# Patient Record
Sex: FEMALE | Race: BLACK OR AFRICAN AMERICAN | NOT HISPANIC OR LATINO | Employment: UNEMPLOYED | URBAN - METROPOLITAN AREA
[De-identification: names, ages, dates, MRNs, and addresses within clinical notes are randomized per-mention and may not be internally consistent; named-entity substitution may affect disease eponyms.]

---

## 2023-10-11 ENCOUNTER — ANCILLARY PROCEDURE (OUTPATIENT)
Dept: RADIOLOGY | Facility: CLINIC | Age: 28
End: 2023-10-11
Payer: COMMERCIAL

## 2023-10-11 DIAGNOSIS — Z36.3 ENCOUNTER FOR ANTENATAL SCREENING FOR MALFORMATIONS (HHS-HCC): ICD-10-CM

## 2023-10-11 PROCEDURE — 76805 OB US >/= 14 WKS SNGL FETUS: CPT | Performed by: OBSTETRICS & GYNECOLOGY

## 2023-10-11 PROCEDURE — 76805 OB US >/= 14 WKS SNGL FETUS: CPT

## 2023-10-17 ENCOUNTER — HOSPITAL ENCOUNTER (OUTPATIENT)
Facility: HOSPITAL | Age: 28
End: 2023-10-17
Attending: OBSTETRICS & GYNECOLOGY | Admitting: OBSTETRICS & GYNECOLOGY
Payer: COMMERCIAL

## 2023-10-17 ENCOUNTER — HOSPITAL ENCOUNTER (OUTPATIENT)
Facility: HOSPITAL | Age: 28
Discharge: HOME | End: 2023-10-17
Attending: OBSTETRICS & GYNECOLOGY | Admitting: OBSTETRICS & GYNECOLOGY
Payer: COMMERCIAL

## 2023-10-17 VITALS
HEIGHT: 65 IN | RESPIRATION RATE: 16 BRPM | OXYGEN SATURATION: 96 % | TEMPERATURE: 97.9 F | SYSTOLIC BLOOD PRESSURE: 118 MMHG | WEIGHT: 130 LBS | DIASTOLIC BLOOD PRESSURE: 61 MMHG | HEART RATE: 65 BPM | BODY MASS INDEX: 21.66 KG/M2

## 2023-10-17 PROCEDURE — 99214 OFFICE O/P EST MOD 30 MIN: CPT | Mod: 25

## 2023-10-17 PROCEDURE — 99214 OFFICE O/P EST MOD 30 MIN: CPT | Performed by: STUDENT IN AN ORGANIZED HEALTH CARE EDUCATION/TRAINING PROGRAM

## 2023-10-17 SDOH — SOCIAL STABILITY: SOCIAL INSECURITY: ARE YOU OR HAVE YOU BEEN THREATENED OR ABUSED PHYSICALLY, EMOTIONALLY, OR SEXUALLY BY ANYONE?: NO

## 2023-10-17 SDOH — HEALTH STABILITY: MENTAL HEALTH: NON-SPECIFIC ACTIVE SUICIDAL THOUGHTS (PAST 1 MONTH): NO

## 2023-10-17 SDOH — SOCIAL STABILITY: SOCIAL INSECURITY: PHYSICAL ABUSE: DENIES

## 2023-10-17 SDOH — SOCIAL STABILITY: SOCIAL INSECURITY: DOES ANYONE TRY TO KEEP YOU FROM HAVING/CONTACTING OTHER FRIENDS OR DOING THINGS OUTSIDE YOUR HOME?: NO

## 2023-10-17 SDOH — SOCIAL STABILITY: SOCIAL INSECURITY: ABUSE SCREEN: ADULT

## 2023-10-17 SDOH — ECONOMIC STABILITY: HOUSING INSECURITY: DO YOU FEEL UNSAFE GOING BACK TO THE PLACE WHERE YOU ARE LIVING?: NO

## 2023-10-17 SDOH — HEALTH STABILITY: MENTAL HEALTH: SUICIDAL BEHAVIOR (LIFETIME): NO

## 2023-10-17 SDOH — HEALTH STABILITY: MENTAL HEALTH: WISH TO BE DEAD (PAST 1 MONTH): NO

## 2023-10-17 SDOH — SOCIAL STABILITY: SOCIAL INSECURITY: HAVE YOU HAD THOUGHTS OF HARMING ANYONE ELSE?: NO

## 2023-10-17 SDOH — SOCIAL STABILITY: SOCIAL INSECURITY: DO YOU FEEL ANYONE HAS EXPLOITED OR TAKEN ADVANTAGE OF YOU FINANCIALLY OR OF YOUR PERSONAL PROPERTY?: NO

## 2023-10-17 SDOH — SOCIAL STABILITY: SOCIAL INSECURITY: VERBAL ABUSE: DENIES

## 2023-10-17 SDOH — SOCIAL STABILITY: SOCIAL INSECURITY: HAS ANYONE EVER THREATENED TO HURT YOUR FAMILY OR YOUR PETS?: NO

## 2023-10-17 SDOH — HEALTH STABILITY: MENTAL HEALTH: WERE YOU ABLE TO COMPLETE ALL THE BEHAVIORAL HEALTH SCREENINGS?: YES

## 2023-10-17 SDOH — SOCIAL STABILITY: SOCIAL INSECURITY: ARE THERE ANY APPARENT SIGNS OF INJURIES/BEHAVIORS THAT COULD BE RELATED TO ABUSE/NEGLECT?: NO

## 2023-10-17 ASSESSMENT — LIFESTYLE VARIABLES
SKIP TO QUESTIONS 9-10: 1
AUDIT-C TOTAL SCORE: 0
HOW OFTEN DO YOU HAVE 6 OR MORE DRINKS ON ONE OCCASION: NEVER
HOW MANY STANDARD DRINKS CONTAINING ALCOHOL DO YOU HAVE ON A TYPICAL DAY: PATIENT DOES NOT DRINK
AUDIT-C TOTAL SCORE: 0
HOW OFTEN DO YOU HAVE A DRINK CONTAINING ALCOHOL: NEVER

## 2023-10-17 ASSESSMENT — PATIENT HEALTH QUESTIONNAIRE - PHQ9
2. FEELING DOWN, DEPRESSED OR HOPELESS: NOT AT ALL
1. LITTLE INTEREST OR PLEASURE IN DOING THINGS: NOT AT ALL
SUM OF ALL RESPONSES TO PHQ9 QUESTIONS 1 & 2: 0

## 2023-10-17 ASSESSMENT — PAIN SCALES - GENERAL: PAINLEVEL_OUTOF10: 0 - NO PAIN
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
## 2023-11-13 ENCOUNTER — APPOINTMENT (OUTPATIENT)
Dept: RADIOLOGY | Facility: CLINIC | Age: 28
End: 2023-11-13
Payer: COMMERCIAL

## 2023-11-16 ENCOUNTER — HOSPITAL ENCOUNTER (INPATIENT)
Facility: HOSPITAL | Age: 28
LOS: 6 days | Discharge: HOME | End: 2023-11-22
Attending: OBSTETRICS & GYNECOLOGY | Admitting: OBSTETRICS & GYNECOLOGY
Payer: COMMERCIAL

## 2023-11-16 ENCOUNTER — HOSPITAL ENCOUNTER (EMERGENCY)
Facility: HOSPITAL | Age: 28
Discharge: OTHER INSTITUTION | End: 2023-11-16
Payer: COMMERCIAL

## 2023-11-16 DIAGNOSIS — F10.20 SEVERE ALCOHOL USE DISORDER (MULTI): Primary | ICD-10-CM

## 2023-11-16 DIAGNOSIS — K85.90 ACUTE RECURRENT PANCREATITIS (HHS-HCC): ICD-10-CM

## 2023-11-16 PROBLEM — F17.200 CURRENT EVERY DAY SMOKER: Status: ACTIVE | Noted: 2023-11-16

## 2023-11-16 PROBLEM — Z87.19 HISTORY OF PANCREATITIS: Status: ACTIVE | Noted: 2023-11-16

## 2023-11-16 PROBLEM — O09.30: Status: ACTIVE | Noted: 2023-11-16

## 2023-11-16 LAB
ABO GROUP (TYPE) IN BLOOD: NORMAL
ALBUMIN SERPL BCP-MCNC: 3.8 G/DL (ref 3.4–5)
ALP SERPL-CCNC: 49 U/L (ref 33–110)
ALT SERPL W P-5'-P-CCNC: 8 U/L (ref 7–45)
AMYLASE SERPL-CCNC: 119 U/L (ref 29–103)
ANION GAP SERPL CALC-SCNC: 16 MMOL/L (ref 10–20)
ANTIBODY SCREEN: NORMAL
AST SERPL W P-5'-P-CCNC: 15 U/L (ref 9–39)
BILIRUB SERPL-MCNC: 0.4 MG/DL (ref 0–1.2)
BILIRUBIN, POC: NEGATIVE
BLOOD URINE, POC: NEGATIVE
BUN SERPL-MCNC: 6 MG/DL (ref 6–23)
CALCIUM SERPL-MCNC: 8.7 MG/DL (ref 8.6–10.6)
CHLORIDE SERPL-SCNC: 104 MMOL/L (ref 98–107)
CLARITY, POC: CLEAR
CO2 SERPL-SCNC: 21 MMOL/L (ref 21–32)
COLOR, POC: YELLOW
CREAT SERPL-MCNC: 0.43 MG/DL (ref 0.5–1.05)
CREAT UR-MCNC: 185 MG/DL (ref 20–320)
ERYTHROCYTE [DISTWIDTH] IN BLOOD BY AUTOMATED COUNT: 13.6 % (ref 11.5–14.5)
FERRITIN SERPL-MCNC: 30 NG/ML
FLUAV RNA RESP QL NAA+PROBE: NOT DETECTED
FLUBV RNA RESP QL NAA+PROBE: NOT DETECTED
FOLATE SERPL-MCNC: 11.6 NG/ML
GFR SERPL CREATININE-BSD FRML MDRD: >90 ML/MIN/1.73M*2
GLUCOSE SERPL-MCNC: 83 MG/DL (ref 74–99)
GLUCOSE URINE, POC: NEGATIVE
HBV SURFACE AG SERPL QL IA: NONREACTIVE
HCT VFR BLD AUTO: 32.2 % (ref 36–46)
HCV AB SER QL: NONREACTIVE
HGB BLD-MCNC: 10.4 G/DL (ref 12–16)
HIV 1+2 AB+HIV1 P24 AG SERPL QL IA: NONREACTIVE
IRON SATN MFR SERPL: 30 %
IRON SERPL-MCNC: 152 UG/DL
KETONES, POC: POSITIVE
LDH SERPL L TO P-CCNC: 134 U/L (ref 84–246)
LEUKOCYTE EST, POC: NEGATIVE
LIPASE SERPL-CCNC: 568 U/L (ref 9–82)
MCH RBC QN AUTO: 30.5 PG (ref 26–34)
MCHC RBC AUTO-ENTMCNC: 32.3 G/DL (ref 32–36)
MCV RBC AUTO: 94 FL (ref 80–100)
NITRITE, POC: NEGATIVE
NRBC BLD-RTO: 0 /100 WBCS (ref 0–0)
PH, POC: 6
PLATELET # BLD AUTO: 196 X10*3/UL (ref 150–450)
POC APPEARANCE OF BODY FLUID: CLEAR
POTASSIUM SERPL-SCNC: 3.5 MMOL/L (ref 3.5–5.3)
PROT SERPL-MCNC: 6.5 G/DL (ref 6.4–8.2)
PROT UR-ACNC: 50 MG/DL (ref 5–24)
PROT/CREAT UR: 0.27 MG/MG CREAT (ref 0–0.17)
RBC # BLD AUTO: 3.41 X10*6/UL (ref 4–5.2)
REFLEX ADDED, ANEMIA PANEL: NORMAL
RH FACTOR (ANTIGEN D): NORMAL
RUBV IGG SERPL IA-ACNC: 2.1 IA
RUBV IGG SERPL QL IA: POSITIVE
SARS-COV-2 RNA RESP QL NAA+PROBE: NOT DETECTED
SODIUM SERPL-SCNC: 137 MMOL/L (ref 136–145)
SPECIFIC GRAVITY, POC: 1.03
T PALLIDUM AB SER QL: NONREACTIVE
TIBC SERPL-MCNC: 501 UG/DL
UIBC SERPL-MCNC: 349 UG/DL
URINE PROTEIN, POC: NORMAL
UROBILINOGEN, POC: 0.2
VIT B12 SERPL-MCNC: 226 PG/ML
WBC # BLD AUTO: 11.8 X10*3/UL (ref 4.4–11.3)

## 2023-11-16 PROCEDURE — 87340 HEPATITIS B SURFACE AG IA: CPT | Performed by: NURSE PRACTITIONER

## 2023-11-16 PROCEDURE — 86317 IMMUNOASSAY INFECTIOUS AGENT: CPT | Performed by: NURSE PRACTITIONER

## 2023-11-16 PROCEDURE — 87636 SARSCOV2 & INF A&B AMP PRB: CPT | Performed by: NURSE PRACTITIONER

## 2023-11-16 PROCEDURE — 87389 HIV-1 AG W/HIV-1&-2 AB AG IA: CPT | Performed by: NURSE PRACTITIONER

## 2023-11-16 PROCEDURE — 2500000004 HC RX 250 GENERAL PHARMACY W/ HCPCS (ALT 636 FOR OP/ED): Performed by: NURSE PRACTITIONER

## 2023-11-16 PROCEDURE — 85027 COMPLETE CBC AUTOMATED: CPT | Performed by: NURSE PRACTITIONER

## 2023-11-16 PROCEDURE — 99223 1ST HOSP IP/OBS HIGH 75: CPT | Performed by: NURSE PRACTITIONER

## 2023-11-16 PROCEDURE — 1210000001 HC SEMI-PRIVATE ROOM DAILY

## 2023-11-16 PROCEDURE — 86780 TREPONEMA PALLIDUM: CPT | Performed by: NURSE PRACTITIONER

## 2023-11-16 PROCEDURE — 99223 1ST HOSP IP/OBS HIGH 75: CPT | Performed by: OBSTETRICS & GYNECOLOGY

## 2023-11-16 PROCEDURE — 83690 ASSAY OF LIPASE: CPT | Performed by: NURSE PRACTITIONER

## 2023-11-16 PROCEDURE — 82728 ASSAY OF FERRITIN: CPT | Performed by: NURSE PRACTITIONER

## 2023-11-16 PROCEDURE — 82607 VITAMIN B-12: CPT | Performed by: NURSE PRACTITIONER

## 2023-11-16 PROCEDURE — 83615 LACTATE (LD) (LDH) ENZYME: CPT | Performed by: NURSE PRACTITIONER

## 2023-11-16 PROCEDURE — 82570 ASSAY OF URINE CREATININE: CPT | Performed by: NURSE PRACTITIONER

## 2023-11-16 PROCEDURE — 36415 COLL VENOUS BLD VENIPUNCTURE: CPT | Performed by: NURSE PRACTITIONER

## 2023-11-16 PROCEDURE — 86850 RBC ANTIBODY SCREEN: CPT | Performed by: NURSE PRACTITIONER

## 2023-11-16 PROCEDURE — 83550 IRON BINDING TEST: CPT | Performed by: NURSE PRACTITIONER

## 2023-11-16 PROCEDURE — 86803 HEPATITIS C AB TEST: CPT | Performed by: NURSE PRACTITIONER

## 2023-11-16 PROCEDURE — 81002 URINALYSIS NONAUTO W/O SCOPE: CPT | Performed by: NURSE PRACTITIONER

## 2023-11-16 PROCEDURE — 80053 COMPREHEN METABOLIC PANEL: CPT | Performed by: NURSE PRACTITIONER

## 2023-11-16 PROCEDURE — 4500999001 HC ED NO CHARGE

## 2023-11-16 PROCEDURE — 87086 URINE CULTURE/COLONY COUNT: CPT | Performed by: NURSE PRACTITIONER

## 2023-11-16 PROCEDURE — 82150 ASSAY OF AMYLASE: CPT | Performed by: NURSE PRACTITIONER

## 2023-11-16 PROCEDURE — 82746 ASSAY OF FOLIC ACID SERUM: CPT | Performed by: NURSE PRACTITIONER

## 2023-11-16 RX ORDER — FAMOTIDINE 10 MG/ML
40 INJECTION INTRAVENOUS
Status: DISCONTINUED | OUTPATIENT
Start: 2023-11-17 | End: 2023-11-22 | Stop reason: HOSPADM

## 2023-11-16 RX ORDER — MULTIVIT-MIN/IRON FUM/FOLIC AC 7.5 MG-4
1 TABLET ORAL DAILY
Status: DISCONTINUED | OUTPATIENT
Start: 2023-11-16 | End: 2023-11-22 | Stop reason: HOSPADM

## 2023-11-16 RX ORDER — ACETAMINOPHEN 325 MG/1
650 TABLET ORAL EVERY 6 HOURS PRN
Status: DISCONTINUED | OUTPATIENT
Start: 2023-11-16 | End: 2023-11-17

## 2023-11-16 RX ORDER — LIDOCAINE HYDROCHLORIDE 10 MG/ML
0.5 INJECTION INFILTRATION; PERINEURAL ONCE AS NEEDED
Status: DISCONTINUED | OUTPATIENT
Start: 2023-11-16 | End: 2023-11-16

## 2023-11-16 RX ORDER — SODIUM CHLORIDE, SODIUM LACTATE, POTASSIUM CHLORIDE, CALCIUM CHLORIDE 600; 310; 30; 20 MG/100ML; MG/100ML; MG/100ML; MG/100ML
100 INJECTION, SOLUTION INTRAVENOUS CONTINUOUS
Status: DISCONTINUED | OUTPATIENT
Start: 2023-11-16 | End: 2023-11-22 | Stop reason: HOSPADM

## 2023-11-16 RX ORDER — GABAPENTIN 300 MG/1
300 CAPSULE ORAL EVERY 8 HOURS SCHEDULED
Status: DISCONTINUED | OUTPATIENT
Start: 2023-11-16 | End: 2023-11-22 | Stop reason: HOSPADM

## 2023-11-16 RX ORDER — NIFEDIPINE 10 MG/1
10 CAPSULE ORAL ONCE AS NEEDED
Status: DISCONTINUED | OUTPATIENT
Start: 2023-11-16 | End: 2023-11-16

## 2023-11-16 RX ORDER — HYDRALAZINE HYDROCHLORIDE 20 MG/ML
5 INJECTION INTRAMUSCULAR; INTRAVENOUS ONCE AS NEEDED
Status: DISCONTINUED | OUTPATIENT
Start: 2023-11-16 | End: 2023-11-22 | Stop reason: HOSPADM

## 2023-11-16 RX ORDER — CYCLOBENZAPRINE HCL 10 MG
10 TABLET ORAL ONCE
Status: DISCONTINUED | OUTPATIENT
Start: 2023-11-16 | End: 2023-11-16

## 2023-11-16 RX ORDER — HYDROMORPHONE HYDROCHLORIDE 2 MG/1
1 TABLET ORAL EVERY 4 HOURS PRN
Status: DISCONTINUED | OUTPATIENT
Start: 2023-11-16 | End: 2023-11-21

## 2023-11-16 RX ORDER — FOLIC ACID 1 MG/1
1 TABLET ORAL DAILY
Status: DISCONTINUED | OUTPATIENT
Start: 2023-11-16 | End: 2023-11-22 | Stop reason: HOSPADM

## 2023-11-16 RX ORDER — ONDANSETRON HYDROCHLORIDE 2 MG/ML
4 INJECTION, SOLUTION INTRAVENOUS EVERY 6 HOURS
Status: DISCONTINUED | OUTPATIENT
Start: 2023-11-16 | End: 2023-11-17

## 2023-11-16 RX ORDER — BISACODYL 10 MG/1
10 SUPPOSITORY RECTAL DAILY PRN
Status: DISCONTINUED | OUTPATIENT
Start: 2023-11-16 | End: 2023-11-22 | Stop reason: HOSPADM

## 2023-11-16 RX ORDER — LABETALOL HYDROCHLORIDE 5 MG/ML
20 INJECTION, SOLUTION INTRAVENOUS ONCE AS NEEDED
Status: DISCONTINUED | OUTPATIENT
Start: 2023-11-16 | End: 2023-11-16

## 2023-11-16 RX ORDER — NICOTINE 7MG/24HR
1 PATCH, TRANSDERMAL 24 HOURS TRANSDERMAL DAILY
Status: DISCONTINUED | OUTPATIENT
Start: 2023-11-16 | End: 2023-11-22 | Stop reason: HOSPADM

## 2023-11-16 RX ORDER — HYDROMORPHONE HYDROCHLORIDE 1 MG/ML
0.4 INJECTION, SOLUTION INTRAMUSCULAR; INTRAVENOUS; SUBCUTANEOUS EVERY 2 HOUR PRN
Status: DISCONTINUED | OUTPATIENT
Start: 2023-11-16 | End: 2023-11-16

## 2023-11-16 RX ORDER — ADHESIVE BANDAGE
10 BANDAGE TOPICAL
Status: DISCONTINUED | OUTPATIENT
Start: 2023-11-16 | End: 2023-11-22 | Stop reason: HOSPADM

## 2023-11-16 RX ORDER — HYDROMORPHONE HYDROCHLORIDE 1 MG/ML
0.2 INJECTION, SOLUTION INTRAMUSCULAR; INTRAVENOUS; SUBCUTANEOUS
Status: DISCONTINUED | OUTPATIENT
Start: 2023-11-16 | End: 2023-11-16

## 2023-11-16 RX ORDER — THIAMINE HYDROCHLORIDE 100 MG/ML
100 INJECTION, SOLUTION INTRAMUSCULAR; INTRAVENOUS DAILY
Status: DISCONTINUED | OUTPATIENT
Start: 2023-11-16 | End: 2023-11-22 | Stop reason: HOSPADM

## 2023-11-16 RX ORDER — POLYETHYLENE GLYCOL 3350 17 G/17G
17 POWDER, FOR SOLUTION ORAL 2 TIMES DAILY PRN
Status: DISCONTINUED | OUTPATIENT
Start: 2023-11-16 | End: 2023-11-22 | Stop reason: HOSPADM

## 2023-11-16 RX ORDER — ONDANSETRON HYDROCHLORIDE 2 MG/ML
4 INJECTION, SOLUTION INTRAVENOUS ONCE
Status: COMPLETED | OUTPATIENT
Start: 2023-11-16 | End: 2023-11-16

## 2023-11-16 RX ORDER — SIMETHICONE 80 MG
80 TABLET,CHEWABLE ORAL 4 TIMES DAILY PRN
Status: DISCONTINUED | OUTPATIENT
Start: 2023-11-16 | End: 2023-11-22 | Stop reason: HOSPADM

## 2023-11-16 RX ORDER — NIFEDIPINE 10 MG/1
10 CAPSULE ORAL ONCE AS NEEDED
Status: DISCONTINUED | OUTPATIENT
Start: 2023-11-16 | End: 2023-11-22 | Stop reason: HOSPADM

## 2023-11-16 RX ORDER — HYDROMORPHONE HYDROCHLORIDE 1 MG/ML
0.4 INJECTION, SOLUTION INTRAMUSCULAR; INTRAVENOUS; SUBCUTANEOUS EVERY 2 HOUR PRN
Status: DISCONTINUED | OUTPATIENT
Start: 2023-11-16 | End: 2023-11-17

## 2023-11-16 RX ORDER — ACETAMINOPHEN 325 MG/1
975 TABLET ORAL ONCE
Status: DISCONTINUED | OUTPATIENT
Start: 2023-11-16 | End: 2023-11-16

## 2023-11-16 RX ORDER — NALTREXONE HYDROCHLORIDE 50 MG/1
50 TABLET, FILM COATED ORAL DAILY
Status: DISCONTINUED | OUTPATIENT
Start: 2023-11-17 | End: 2023-11-17

## 2023-11-16 RX ORDER — HYDROMORPHONE HYDROCHLORIDE 1 MG/ML
0.4 INJECTION, SOLUTION INTRAMUSCULAR; INTRAVENOUS; SUBCUTANEOUS ONCE
Status: DISCONTINUED | OUTPATIENT
Start: 2023-11-16 | End: 2023-11-16

## 2023-11-16 RX ADMIN — ONDANSETRON 4 MG: 2 INJECTION INTRAMUSCULAR; INTRAVENOUS at 18:16

## 2023-11-16 RX ADMIN — HYDROMORPHONE HYDROCHLORIDE 0.2 MG: 1 INJECTION, SOLUTION INTRAMUSCULAR; INTRAVENOUS; SUBCUTANEOUS at 18:17

## 2023-11-16 RX ADMIN — SODIUM CHLORIDE, POTASSIUM CHLORIDE, SODIUM LACTATE AND CALCIUM CHLORIDE 1000 ML: 600; 310; 30; 20 INJECTION, SOLUTION INTRAVENOUS at 18:18

## 2023-11-16 RX ADMIN — SODIUM CHLORIDE, POTASSIUM CHLORIDE, SODIUM LACTATE AND CALCIUM CHLORIDE 125 ML/HR: 600; 310; 30; 20 INJECTION, SOLUTION INTRAVENOUS at 20:45

## 2023-11-16 RX ADMIN — HYDROMORPHONE HYDROCHLORIDE 0.4 MG: 1 INJECTION, SOLUTION INTRAMUSCULAR; INTRAVENOUS; SUBCUTANEOUS at 20:42

## 2023-11-16 SDOH — SOCIAL STABILITY: SOCIAL INSECURITY: DOES ANYONE TRY TO KEEP YOU FROM HAVING/CONTACTING OTHER FRIENDS OR DOING THINGS OUTSIDE YOUR HOME?: NO

## 2023-11-16 SDOH — HEALTH STABILITY: MENTAL HEALTH: WISH TO BE DEAD (PAST 1 MONTH): NO

## 2023-11-16 SDOH — HEALTH STABILITY: MENTAL HEALTH: NON-SPECIFIC ACTIVE SUICIDAL THOUGHTS (PAST 1 MONTH): NO

## 2023-11-16 SDOH — SOCIAL STABILITY: SOCIAL INSECURITY: ABUSE SCREEN: ADULT

## 2023-11-16 SDOH — SOCIAL STABILITY: SOCIAL INSECURITY: HAS ANYONE EVER THREATENED TO HURT YOUR FAMILY OR YOUR PETS?: NO

## 2023-11-16 SDOH — HEALTH STABILITY: MENTAL HEALTH: HAVE YOU USED ANY SUBSTANCES (CANABIS, COCAINE, HEROIN, HALLUCINOGENS, INHALANTS, ETC.) IN THE PAST 12 MONTHS?: NO

## 2023-11-16 SDOH — SOCIAL STABILITY: SOCIAL INSECURITY: ARE THERE ANY APPARENT SIGNS OF INJURIES/BEHAVIORS THAT COULD BE RELATED TO ABUSE/NEGLECT?: NO

## 2023-11-16 SDOH — SOCIAL STABILITY: SOCIAL INSECURITY: PHYSICAL ABUSE: DENIES

## 2023-11-16 SDOH — SOCIAL STABILITY: SOCIAL INSECURITY: VERBAL ABUSE: DENIES

## 2023-11-16 SDOH — SOCIAL STABILITY: SOCIAL INSECURITY: HAVE YOU HAD THOUGHTS OF HARMING ANYONE ELSE?: NO

## 2023-11-16 SDOH — HEALTH STABILITY: MENTAL HEALTH: HAVE YOU USED ANY PRESCRIPTION DRUGS OTHER THAN PRESCRIBED IN THE PAST 12 MONTHS?: NO

## 2023-11-16 SDOH — SOCIAL STABILITY: SOCIAL INSECURITY: DO YOU FEEL ANYONE HAS EXPLOITED OR TAKEN ADVANTAGE OF YOU FINANCIALLY OR OF YOUR PERSONAL PROPERTY?: NO

## 2023-11-16 SDOH — SOCIAL STABILITY: SOCIAL INSECURITY: ARE YOU OR HAVE YOU BEEN THREATENED OR ABUSED PHYSICALLY, EMOTIONALLY, OR SEXUALLY BY ANYONE?: NO

## 2023-11-16 SDOH — ECONOMIC STABILITY: HOUSING INSECURITY: DO YOU FEEL UNSAFE GOING BACK TO THE PLACE WHERE YOU ARE LIVING?: NO

## 2023-11-16 SDOH — HEALTH STABILITY: MENTAL HEALTH: SUICIDAL BEHAVIOR (LIFETIME): NO

## 2023-11-16 SDOH — HEALTH STABILITY: MENTAL HEALTH: WERE YOU ABLE TO COMPLETE ALL THE BEHAVIORAL HEALTH SCREENINGS?: YES

## 2023-11-16 ASSESSMENT — PAIN DESCRIPTION - LOCATION: LOCATION: ABDOMEN

## 2023-11-16 ASSESSMENT — LIFESTYLE VARIABLES
HOW OFTEN DO YOU HAVE 6 OR MORE DRINKS ON ONE OCCASION: NEVER
HOW OFTEN DO YOU HAVE A DRINK CONTAINING ALCOHOL: NEVER
HOW MANY STANDARD DRINKS CONTAINING ALCOHOL DO YOU HAVE ON A TYPICAL DAY: PATIENT DOES NOT DRINK
SKIP TO QUESTIONS 9-10: 1
AUDIT-C TOTAL SCORE: 0
AUDIT-C TOTAL SCORE: 0

## 2023-11-16 ASSESSMENT — PATIENT HEALTH QUESTIONNAIRE - PHQ9
1. LITTLE INTEREST OR PLEASURE IN DOING THINGS: NOT AT ALL
SUM OF ALL RESPONSES TO PHQ9 QUESTIONS 1 & 2: 0
2. FEELING DOWN, DEPRESSED OR HOPELESS: NOT AT ALL

## 2023-11-16 ASSESSMENT — PAIN SCALES - GENERAL: PAINLEVEL_OUTOF10: 10 - WORST POSSIBLE PAIN

## 2023-11-16 NOTE — H&P
Obstetrical Admission History and Physical     Arlen Braga is a 27 y.o. [id number] at 24w3d. FLACO: 3/4/2024, by LMP c/w 12.1 wk US.  She has had limited prenatal care with [facility] .    Chief Complaint: Abdominal pain, N/V/D    Assessment/Plan    #Acute Pancreatitis, presumably related to ongoing EtOH use and recent nausea and poor hydration status  - 1L fluid bolus given in triage for hypovolemia (SG 1.030, 3+ ketones), will follow with IVF at 125mL/hr until tolerating PO well  - Pain control with IV and PO opioids with plan to transition to PO Tylenol once patient out of acute pain   - Pepcid 40mg IV q24 hrs  - Check amylase, lipase, magnesium, CBC, CMP, ionized calcium q8 until stable x 2. Initial labs notable for elevated lipase 568 and amylase 119 with nl LFTs, nl CBC.  - NPO at this time, will advance as tolerated  - Strict I/O  - COVID/flu neg. Urine culture pending as part of routine PNL. No other sick symptoms at this time. Afebrile.    # AUD with onset after birth of her 7 year old daughter, Anxiety, Depression  - Pt notes reduction of EtOH use since pregnancy but is triggered to drink as a coping mechanism for anxiety, life stress  - [facility] protocol on admission  - Thiamine 100 mg IV daily  - Gabapentin 300mg TID for both treatment of anxiety, insomnia, and risk for mild alcohol withdrawal. Pt states she tries SRIs in the past (? Zoloft) and it made her feel too numb.   - Recommend adding naltexone 50mg daily to help with alcohol use reduction once no longer requiring opioid medications  - Pt states she is already engaged in individual behavioral counseling with a counselor at [facility] and is not interested in further referrals at this time  - Mom was interested in a  referral to help her obtain a crib and pack-n-play and assistance with finding better housing    # Nicotine dependency  - Nicotine patch 7mg/d    # Fetal wellbeing  - NST reactive in triage  - PNL drawn on admission as we are unable to access [facility]  records  - Plan for completion of anatomy scan in AM  - [facility] protocol    # Elevated blood pressure  - Bps cycled in triage and have been mild range to normotensive  - no h/o HDP and does not yet meet criteria for HDP  - no PEC s/s  - HELLP labs neg with P:C 0.27  - suspect pain related elevation of BP    Admit to [facility] under Antepartum service  Staffed with KIRILL Gallo    I saw and evaluated the patient. I personally obtained the key and critical portions of the history and physical exam or was physically present for key and critical portions performed by the NP. I reviewed the NP's documentation and made edits as necessary. I agree with the medical decision making as documented in the note.    Kathryn Dotson MD    Principal Problem:    Acute recurrent pancreatitis      Pregnancy Problems (from 10/17/23 to present)       Problem Noted Resolved    History of pancreatitis 11/16/2023 by KIRILL Levy No    Priority:  Medium      Overview Addendum 11/16/2023  5:16 PM by KIRILL Levy     Hospitalized in 3/2023         Severe alcohol use disorder (CMS/HCC) 11/16/2023 by KIRILL Levy No    Priority:  Medium      Overview Signed 11/16/2023  5:17 PM by KIRILL Levy     Admits to drinking one MXD adiar can daily throughout pregnancy.          Limited prenatal care, antepartum 11/16/2023 by KIRILL Levy No    Priority:  Medium      Current every day smoker 11/16/2023 by KIRILL Levy No    Priority:  Medium      Overview Signed 11/16/2023  8:34 PM by KIRILL Levy     Reports 6 cigarettes per day         Asthma 3/22/2011 by KIRILL Levy No    Priority:  Medium      Overview Signed 11/16/2023  5:03 PM by KIRILL Levy     Never been hospitalized. Using rescue inhaler once Q night               The patient's pregnancy complications have been discussed with the patient in detail.  Admit to inpatient status. I anticipate  that this patient will require a stay exceeding 2 midnights.  Active management of the pregnancy complications.     Subjective   Arlen is here with c/o abdominal pain nausea/vomiting/diarrhea since 0500. She reports the abdominal pain as sharp, constant, left sided abdominal pain. She felt nauseated and had hot flashes all day yesterday but the vomiting and diarrhea started today. Unsure if she has had a fever. 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 She denies any VB, LOF, vaginal itching, irritation, odor, or concern for STIs. She reports good FM.     Additionally, pt has h/o acute alcoholic pancreatitis 3/2023 and admits to one MXD alcoholic beverage daily throughout pregnancy. She states this abdominal pain is similar to pain when admitted with pancreatitis.     Of note: pt had mild range BP on arrival to triage. She has no h/o HDP and denies HA, scotoma, RUQ pain, chest pain, or SOB.     The patient is being admitted for further evaluation and monitoring.      Obstetrical History   OB History    Para Term  AB Living   5 1 1   3     SAB IAB Ectopic Multiple Live Births   3              # Outcome Date GA Lbr Olegario/2nd Weight Sex Delivery Anes PTL Lv   5 Current            4 2018           3 Term 2016   2948 g F Vag-Spont      2 2014           1 2012               Past Medical History  No past medical history on file.     Past Surgical History   Past Surgical History:   Procedure Laterality Date    WISDOM TOOTH EXTRACTION Bilateral     pt unsure when       Social History  Social History     Tobacco Use    Smoking status: Not on file    Smokeless tobacco: Not on file   Substance Use Topics    Alcohol use: Not on file     Substance and Sexual Activity   Drug Use Not on file       Allergies  Patient has no known allergies.     Medications  No medications prior to admission.       Objective    Last Vitals  Temp  Pulse Resp BP MAP O2 Sat     73   139/88   100 %     Physical Examination  Physical Exam  Constitutional:       General: She is in acute distress.      Appearance: She is ill-appearing.   HENT:      Head: Normocephalic.   Eyes:      Comments: Conjunctiva slightly yellow bilaterally   Cardiovascular:      Rate and Rhythm: Normal rate and regular rhythm.      Heart sounds: Normal heart sounds.   Pulmonary:      Effort: Pulmonary effort is normal.      Breath sounds: Normal breath sounds.   Abdominal:      Palpations: Abdomen is soft.      Tenderness: There is abdominal tenderness.      Comments: Left upper and lower quads tender to mild palpation  No RUQ pain with palpation   Genitourinary:     Comments: , mod variability, appropriate for gestational age  Goddard: Q 3-4 mins, abdomen soft to palpate  SVE closed/50/-3  Musculoskeletal:         General: Normal range of motion.      Cervical back: Normal range of motion.   Skin:     General: Skin is warm and dry.   Neurological:      General: No focal deficit present.      Mental Status: She is alert and oriented to person, place, and time.   Psychiatric:         Behavior: Behavior normal.     Lab Review  Admission on 11/16/2023   Component Date Value Ref Range Status    Color, UA 11/16/2023 Yellow   Final    Clarity, UA 11/16/2023 Clear   Final    Glucose, UA 11/16/2023 NEGATIVE   Final    Bilirubin, UA 11/16/2023 NEGATIVE   Final    Ketones, UA 11/16/2023 Positive   Final    Spec Grav, UA 11/16/2023 1.030   Final    Blood, UA 11/16/2023 Negative   Final    pH, UA 11/16/2023 6.0   Final    Protein, UA 11/16/2023 1+   Final    Urobilinogen, UA 11/16/2023 0.2   Final    Leukocytes, UA 11/16/2023 NEGATIVE   Final    Nitrite, UA 11/16/2023 NEGATIVE   Final    Appearance, Fluid 11/16/2023 Clear   Final    Glucose 11/16/2023 83  74 - 99 mg/dL Final    Sodium 11/16/2023 137  136 - 145 mmol/L Final    Potassium 11/16/2023 3.5  3.5 - 5.3 mmol/L Final    Chloride 11/16/2023  104  98 - 107 mmol/L Final    Bicarbonate 11/16/2023 21  21 - 32 mmol/L Final    Anion Gap 11/16/2023 16  10 - 20 mmol/L Final    Urea Nitrogen 11/16/2023 6  6 - 23 mg/dL Final    Creatinine 11/16/2023 0.43 (L)  0.50 - 1.05 mg/dL Final    eGFR 11/16/2023 >90  >60 mL/min/1.73m*2 Final    Calculations of estimated GFR are performed using the 2021 CKD-EPI Study Refit equation without the race variable for the IDMS-Traceable creatinine methods.  https://jasn.asnjournals.org/content/early/2021/09/22/ASN.8228564662    Calcium 11/16/2023 8.7  8.6 - 10.6 mg/dL Final    Albumin 11/16/2023 3.8  3.4 - 5.0 g/dL Final    Alkaline Phosphatase 11/16/2023 49  33 - 110 U/L Final    Total Protein 11/16/2023 6.5  6.4 - 8.2 g/dL Final    AST 11/16/2023 15  9 - 39 U/L Final    Bilirubin, Total 11/16/2023 0.4  0.0 - 1.2 mg/dL Final    ALT 11/16/2023 8  7 - 45 U/L Final    Patients treated with Sulfasalazine may generate falsely decreased results for ALT.    LDH 11/16/2023 134  84 - 246 U/L Final    Total Protein, Urine Random 11/16/2023 50 (H)  5 - 24 mg/dL Final    Creatinine, Urine Random 11/16/2023 185.0  20.0 - 320.0 mg/dL Final    T. Protein/Creatinine Ratio 11/16/2023 0.27 (H)  0.00 - 0.17 mg/mg Creat Final    Coronavirus 2019, PCR 11/16/2023 Not Detected  Not Detected Final    Flu A Result 11/16/2023 Not Detected  Not Detected Final    Flu B Result 11/16/2023 Not Detected  Not Detected Final    Amylase 11/16/2023 119 (H)  29 - 103 U/L Final    Lipase 11/16/2023 568 (H)  9 - 82 U/L Final    WBC 11/16/2023 11.8 (H)  4.4 - 11.3 x10*3/uL Final    nRBC 11/16/2023 0.0  0.0 - 0.0 /100 WBCs Final    RBC 11/16/2023 3.41 (L)  4.00 - 5.20 x10*6/uL Final    Hemoglobin 11/16/2023 10.4 (L)  12.0 - 16.0 g/dL Final    Hematocrit 11/16/2023 32.2 (L)  36.0 - 46.0 % Final    MCV 11/16/2023 94  80 - 100 fL Final    MCH 11/16/2023 30.5  26.0 - 34.0 pg Final    MCHC 11/16/2023 32.3  32.0 - 36.0 g/dL Final    RDW 11/16/2023 13.6  11.5 - 14.5 % Final     Platelets 11/16/2023 196  150 - 450 x10*3/uL Final    Syphilis Total Ab 11/16/2023 Nonreactive  Nonreactive Final    No significant level of Treponema pallidum antibody detected.   Repeat testing in 2 to 4 weeks may be considered if early   infection or incubating syphilis infection is suspected.    Hepatitis B Surface AG 11/16/2023 Nonreactive  Nonreactive Final    Biotin interference may cause falsely decreased results. Patients taking a Biotin dose of up to 5 mg/day should refrain from taking Biotin for 24 hours before sample collection. Providers may contact their local laboratory for  further information.    Hepatitis C AB 11/16/2023 Nonreactive  Nonreactive Final    Results from patients taking biotin supplements or receiving high-dose biotin therapy should be interpreted with caution due to possible interference with this test. Providers may contact their local laboratory for further information.    HIV 1/2 Antigen/Antibody Screen wi* 11/16/2023 Nonreactive  Nonreactive Final

## 2023-11-17 ENCOUNTER — APPOINTMENT (OUTPATIENT)
Dept: RADIOLOGY | Facility: HOSPITAL | Age: 28
End: 2023-11-17
Payer: COMMERCIAL

## 2023-11-17 LAB
ALBUMIN SERPL BCP-MCNC: 3.1 G/DL (ref 3.4–5)
ALBUMIN SERPL BCP-MCNC: 3.2 G/DL (ref 3.4–5)
ALBUMIN SERPL BCP-MCNC: 3.6 G/DL (ref 3.4–5)
ALP SERPL-CCNC: 42 U/L (ref 33–110)
ALP SERPL-CCNC: 43 U/L (ref 33–110)
ALP SERPL-CCNC: 45 U/L (ref 33–110)
ALT SERPL W P-5'-P-CCNC: 5 U/L (ref 7–45)
ALT SERPL W P-5'-P-CCNC: 7 U/L (ref 7–45)
ALT SERPL W P-5'-P-CCNC: 7 U/L (ref 7–45)
AMYLASE SERPL-CCNC: 167 U/L (ref 29–103)
AMYLASE SERPL-CCNC: 204 U/L (ref 29–103)
ANION GAP SERPL CALC-SCNC: 10 MMOL/L (ref 10–20)
ANION GAP SERPL CALC-SCNC: 12 MMOL/L (ref 10–20)
ANION GAP SERPL CALC-SCNC: 14 MMOL/L (ref 10–20)
AST SERPL W P-5'-P-CCNC: 12 U/L (ref 9–39)
AST SERPL W P-5'-P-CCNC: 13 U/L (ref 9–39)
AST SERPL W P-5'-P-CCNC: 13 U/L (ref 9–39)
BACTERIA UR CULT: NO GROWTH
BASOPHILS # BLD AUTO: 0.01 X10*3/UL (ref 0–0.1)
BASOPHILS NFR BLD AUTO: 0.1 %
BILIRUB SERPL-MCNC: 0.5 MG/DL (ref 0–1.2)
BUN SERPL-MCNC: 4 MG/DL (ref 6–23)
BUN SERPL-MCNC: 4 MG/DL (ref 6–23)
BUN SERPL-MCNC: 5 MG/DL (ref 6–23)
CA-I BLD-SCNC: 1.13 MMOL/L (ref 1.1–1.33)
CALCIUM SERPL-MCNC: 8.1 MG/DL (ref 8.6–10.6)
CALCIUM SERPL-MCNC: 8.4 MG/DL (ref 8.6–10.6)
CALCIUM SERPL-MCNC: 8.5 MG/DL (ref 8.6–10.6)
CHLORIDE SERPL-SCNC: 103 MMOL/L (ref 98–107)
CHLORIDE SERPL-SCNC: 104 MMOL/L (ref 98–107)
CHLORIDE SERPL-SCNC: 105 MMOL/L (ref 98–107)
CO2 SERPL-SCNC: 21 MMOL/L (ref 21–32)
CO2 SERPL-SCNC: 22 MMOL/L (ref 21–32)
CO2 SERPL-SCNC: 23 MMOL/L (ref 21–32)
CREAT SERPL-MCNC: 0.39 MG/DL (ref 0.5–1.05)
CREAT SERPL-MCNC: 0.43 MG/DL (ref 0.5–1.05)
CREAT SERPL-MCNC: 0.45 MG/DL (ref 0.5–1.05)
EOSINOPHIL # BLD AUTO: 0.01 X10*3/UL (ref 0–0.7)
EOSINOPHIL NFR BLD AUTO: 0.1 %
ERYTHROCYTE [DISTWIDTH] IN BLOOD BY AUTOMATED COUNT: 13.3 % (ref 11.5–14.5)
ERYTHROCYTE [DISTWIDTH] IN BLOOD BY AUTOMATED COUNT: 13.6 % (ref 11.5–14.5)
GFR SERPL CREATININE-BSD FRML MDRD: >90 ML/MIN/1.73M*2
GLUCOSE SERPL-MCNC: 76 MG/DL (ref 74–99)
GLUCOSE SERPL-MCNC: 88 MG/DL (ref 74–99)
GLUCOSE SERPL-MCNC: 90 MG/DL (ref 74–99)
HCT VFR BLD AUTO: 28.2 % (ref 36–46)
HCT VFR BLD AUTO: 29 % (ref 36–46)
HGB BLD-MCNC: 9 G/DL (ref 12–16)
HGB BLD-MCNC: 9.1 G/DL (ref 12–16)
IMM GRANULOCYTES # BLD AUTO: 0.06 X10*3/UL (ref 0–0.7)
IMM GRANULOCYTES NFR BLD AUTO: 0.5 % (ref 0–0.9)
LIPASE SERPL-CCNC: 1081 U/L (ref 9–82)
LIPASE SERPL-CCNC: 1668 U/L (ref 9–82)
LIPASE SERPL-CCNC: 1955 U/L (ref 9–82)
LYMPHOCYTES # BLD AUTO: 1.35 X10*3/UL (ref 1.2–4.8)
LYMPHOCYTES NFR BLD AUTO: 12.2 %
MAGNESIUM SERPL-MCNC: 1.52 MG/DL (ref 1.6–2.4)
MAGNESIUM SERPL-MCNC: 1.96 MG/DL (ref 1.6–2.4)
MCH RBC QN AUTO: 30 PG (ref 26–34)
MCH RBC QN AUTO: 30.5 PG (ref 26–34)
MCHC RBC AUTO-ENTMCNC: 31 G/DL (ref 32–36)
MCHC RBC AUTO-ENTMCNC: 32.3 G/DL (ref 32–36)
MCV RBC AUTO: 95 FL (ref 80–100)
MCV RBC AUTO: 97 FL (ref 80–100)
MONOCYTES # BLD AUTO: 0.55 X10*3/UL (ref 0.1–1)
MONOCYTES NFR BLD AUTO: 5 %
NEUTROPHILS # BLD AUTO: 9.05 X10*3/UL (ref 1.2–7.7)
NEUTROPHILS NFR BLD AUTO: 82.1 %
NRBC BLD-RTO: 0 /100 WBCS (ref 0–0)
NRBC BLD-RTO: 0 /100 WBCS (ref 0–0)
PLATELET # BLD AUTO: 158 X10*3/UL (ref 150–450)
PLATELET # BLD AUTO: 169 X10*3/UL (ref 150–450)
POTASSIUM SERPL-SCNC: 3.4 MMOL/L (ref 3.5–5.3)
POTASSIUM SERPL-SCNC: 3.7 MMOL/L (ref 3.5–5.3)
POTASSIUM SERPL-SCNC: 4 MMOL/L (ref 3.5–5.3)
PROT SERPL-MCNC: 5.2 G/DL (ref 6.4–8.2)
PROT SERPL-MCNC: 5.4 G/DL (ref 6.4–8.2)
PROT SERPL-MCNC: 6 G/DL (ref 6.4–8.2)
RBC # BLD AUTO: 2.98 X10*6/UL (ref 4–5.2)
RBC # BLD AUTO: 3 X10*6/UL (ref 4–5.2)
SODIUM SERPL-SCNC: 133 MMOL/L (ref 136–145)
SODIUM SERPL-SCNC: 135 MMOL/L (ref 136–145)
SODIUM SERPL-SCNC: 135 MMOL/L (ref 136–145)
WBC # BLD AUTO: 11 X10*3/UL (ref 4.4–11.3)
WBC # BLD AUTO: 9.7 X10*3/UL (ref 4.4–11.3)

## 2023-11-17 PROCEDURE — 36415 COLL VENOUS BLD VENIPUNCTURE: CPT

## 2023-11-17 PROCEDURE — 82330 ASSAY OF CALCIUM: CPT

## 2023-11-17 PROCEDURE — 59025 FETAL NON-STRESS TEST: CPT | Mod: GC | Performed by: STUDENT IN AN ORGANIZED HEALTH CARE EDUCATION/TRAINING PROGRAM

## 2023-11-17 PROCEDURE — 76705 ECHO EXAM OF ABDOMEN: CPT | Performed by: RADIOLOGY

## 2023-11-17 PROCEDURE — 1210000001 HC SEMI-PRIVATE ROOM DAILY

## 2023-11-17 PROCEDURE — 83690 ASSAY OF LIPASE: CPT

## 2023-11-17 PROCEDURE — 76816 OB US FOLLOW-UP PER FETUS: CPT | Performed by: OBSTETRICS & GYNECOLOGY

## 2023-11-17 PROCEDURE — 80053 COMPREHEN METABOLIC PANEL: CPT

## 2023-11-17 PROCEDURE — 85027 COMPLETE CBC AUTOMATED: CPT

## 2023-11-17 PROCEDURE — 99222 1ST HOSP IP/OBS MODERATE 55: CPT | Performed by: STUDENT IN AN ORGANIZED HEALTH CARE EDUCATION/TRAINING PROGRAM

## 2023-11-17 PROCEDURE — 2500000001 HC RX 250 WO HCPCS SELF ADMINISTERED DRUGS (ALT 637 FOR MEDICARE OP)

## 2023-11-17 PROCEDURE — 2500000004 HC RX 250 GENERAL PHARMACY W/ HCPCS (ALT 636 FOR OP/ED)

## 2023-11-17 PROCEDURE — 85025 COMPLETE CBC W/AUTO DIFF WBC: CPT

## 2023-11-17 PROCEDURE — 82150 ASSAY OF AMYLASE: CPT

## 2023-11-17 PROCEDURE — 76816 OB US FOLLOW-UP PER FETUS: CPT

## 2023-11-17 PROCEDURE — 2500000004 HC RX 250 GENERAL PHARMACY W/ HCPCS (ALT 636 FOR OP/ED): Performed by: STUDENT IN AN ORGANIZED HEALTH CARE EDUCATION/TRAINING PROGRAM

## 2023-11-17 PROCEDURE — 83690 ASSAY OF LIPASE: CPT | Performed by: STUDENT IN AN ORGANIZED HEALTH CARE EDUCATION/TRAINING PROGRAM

## 2023-11-17 PROCEDURE — 80053 COMPREHEN METABOLIC PANEL: CPT | Performed by: STUDENT IN AN ORGANIZED HEALTH CARE EDUCATION/TRAINING PROGRAM

## 2023-11-17 PROCEDURE — 59025 FETAL NON-STRESS TEST: CPT | Performed by: STUDENT IN AN ORGANIZED HEALTH CARE EDUCATION/TRAINING PROGRAM

## 2023-11-17 PROCEDURE — 83735 ASSAY OF MAGNESIUM: CPT

## 2023-11-17 RX ORDER — MAGNESIUM SULFATE HEPTAHYDRATE 40 MG/ML
2 INJECTION, SOLUTION INTRAVENOUS ONCE
Status: COMPLETED | OUTPATIENT
Start: 2023-11-17 | End: 2023-11-17

## 2023-11-17 RX ORDER — ONDANSETRON HYDROCHLORIDE 2 MG/ML
4 INJECTION, SOLUTION INTRAVENOUS EVERY 6 HOURS PRN
Status: DISCONTINUED | OUTPATIENT
Start: 2023-11-17 | End: 2023-11-22 | Stop reason: HOSPADM

## 2023-11-17 RX ORDER — HYDROMORPHONE HYDROCHLORIDE 1 MG/ML
0.4 INJECTION, SOLUTION INTRAMUSCULAR; INTRAVENOUS; SUBCUTANEOUS
Status: DISCONTINUED | OUTPATIENT
Start: 2023-11-17 | End: 2023-11-18

## 2023-11-17 RX ORDER — ACETAMINOPHEN 325 MG/1
975 TABLET ORAL EVERY 6 HOURS
Status: DISCONTINUED | OUTPATIENT
Start: 2023-11-17 | End: 2023-11-22 | Stop reason: HOSPADM

## 2023-11-17 RX ORDER — POTASSIUM CHLORIDE 14.9 MG/ML
20 INJECTION INTRAVENOUS ONCE
Status: COMPLETED | OUTPATIENT
Start: 2023-11-17 | End: 2023-11-17

## 2023-11-17 RX ORDER — POTASSIUM CHLORIDE 14.9 MG/ML
20 INJECTION INTRAVENOUS
Status: DISCONTINUED | OUTPATIENT
Start: 2023-11-17 | End: 2023-11-17

## 2023-11-17 RX ADMIN — ACETAMINOPHEN 650 MG: 325 TABLET ORAL at 08:05

## 2023-11-17 RX ADMIN — POTASSIUM CHLORIDE 20 MEQ: 14.9 INJECTION, SOLUTION INTRAVENOUS at 09:21

## 2023-11-17 RX ADMIN — SODIUM CHLORIDE, POTASSIUM CHLORIDE, SODIUM LACTATE AND CALCIUM CHLORIDE 100 ML/HR: 600; 310; 30; 20 INJECTION, SOLUTION INTRAVENOUS at 20:58

## 2023-11-17 RX ADMIN — SIMETHICONE 80 MG: 80 TABLET, CHEWABLE ORAL at 20:57

## 2023-11-17 RX ADMIN — HYDROMORPHONE HYDROCHLORIDE 0.4 MG: 1 INJECTION, SOLUTION INTRAMUSCULAR; INTRAVENOUS; SUBCUTANEOUS at 00:10

## 2023-11-17 RX ADMIN — ACETAMINOPHEN 975 MG: 325 TABLET ORAL at 20:57

## 2023-11-17 RX ADMIN — HYDROMORPHONE HYDROCHLORIDE 0.4 MG: 1 INJECTION, SOLUTION INTRAMUSCULAR; INTRAVENOUS; SUBCUTANEOUS at 04:05

## 2023-11-17 RX ADMIN — FOLIC ACID 1 MG: 1 TABLET ORAL at 09:21

## 2023-11-17 RX ADMIN — ACETAMINOPHEN 975 MG: 325 TABLET ORAL at 16:08

## 2023-11-17 RX ADMIN — MAGNESIUM SULFATE HEPTAHYDRATE 2 G: 40 INJECTION, SOLUTION INTRAVENOUS at 04:06

## 2023-11-17 RX ADMIN — THIAMINE HYDROCHLORIDE 100 MG: 100 INJECTION, SOLUTION INTRAMUSCULAR; INTRAVENOUS at 09:21

## 2023-11-17 RX ADMIN — HYDROMORPHONE HYDROCHLORIDE 1 MG: 2 TABLET ORAL at 02:57

## 2023-11-17 RX ADMIN — GABAPENTIN 300 MG: 300 CAPSULE ORAL at 16:08

## 2023-11-17 RX ADMIN — GABAPENTIN 300 MG: 300 CAPSULE ORAL at 09:21

## 2023-11-17 RX ADMIN — HYDROMORPHONE HYDROCHLORIDE 1 MG: 2 TABLET ORAL at 12:34

## 2023-11-17 RX ADMIN — FAMOTIDINE 40 MG: 10 INJECTION INTRAVENOUS at 09:18

## 2023-11-17 RX ADMIN — HYDROMORPHONE HYDROCHLORIDE 1 MG: 2 TABLET ORAL at 07:01

## 2023-11-17 RX ADMIN — POTASSIUM CHLORIDE 20 MEQ: 14.9 INJECTION, SOLUTION INTRAVENOUS at 06:22

## 2023-11-17 RX ADMIN — HYDROMORPHONE HYDROCHLORIDE 0.4 MG: 1 INJECTION, SOLUTION INTRAMUSCULAR; INTRAVENOUS; SUBCUTANEOUS at 14:35

## 2023-11-17 RX ADMIN — PRENATAL VIT W/ FE FUMARATE-FA TAB 27-0.8 MG 1 TABLET: 27-0.8 TAB at 09:21

## 2023-11-17 RX ADMIN — HYDROMORPHONE HYDROCHLORIDE 0.4 MG: 1 INJECTION, SOLUTION INTRAMUSCULAR; INTRAVENOUS; SUBCUTANEOUS at 08:06

## 2023-11-17 RX ADMIN — GABAPENTIN 300 MG: 300 CAPSULE ORAL at 00:55

## 2023-11-17 RX ADMIN — POLYETHYLENE GLYCOL 3350 17 G: 17 POWDER, FOR SOLUTION ORAL at 20:58

## 2023-11-17 RX ADMIN — HYDROMORPHONE HYDROCHLORIDE 1 MG: 2 TABLET ORAL at 20:28

## 2023-11-17 ASSESSMENT — LIFESTYLE VARIABLES
ANXIETY: NO ANXIETY, AT EASE
AGITATION: NORMAL ACTIVITY
PAROXYSMAL SWEATS: NO SWEAT VISIBLE
VISUAL DISTURBANCES: NOT PRESENT
TOTAL SCORE: 0
HEADACHE, FULLNESS IN HEAD: NOT PRESENT
ORIENTATION AND CLOUDING OF SENSORIUM: ORIENTED AND CAN DO SERIAL ADDITIONS
ANXIETY: NO ANXIETY, AT EASE
TOTAL SCORE: 1
HEADACHE, FULLNESS IN HEAD: NOT PRESENT
ANXIETY: MILDLY ANXIOUS
HEADACHE, FULLNESS IN HEAD: NOT PRESENT
ANXIETY: MILDLY ANXIOUS
AGITATION: NORMAL ACTIVITY
VISUAL DISTURBANCES: NOT PRESENT
TOTAL SCORE: 1
VISUAL DISTURBANCES: NOT PRESENT
AGITATION: NORMAL ACTIVITY
NAUSEA AND VOMITING: NO NAUSEA AND NO VOMITING
TOTAL SCORE: 4
ORIENTATION AND CLOUDING OF SENSORIUM: ORIENTED AND CAN DO SERIAL ADDITIONS
TOTAL SCORE: 1
TOTAL SCORE: 0
AGITATION: NORMAL ACTIVITY
AUDITORY DISTURBANCES: NOT PRESENT
NAUSEA AND VOMITING: NO NAUSEA AND NO VOMITING
BLOOD PRESSURE: 150/97
AUDITORY DISTURBANCES: NOT PRESENT
VISUAL DISTURBANCES: NOT PRESENT
NAUSEA AND VOMITING: NO NAUSEA AND NO VOMITING
PAROXYSMAL SWEATS: NO SWEAT VISIBLE
HOW OFTEN DO YOU HAVE SIX OR MORE DRINKS ON ONE OCCASION: LESS THAN MONTHLY
NAUSEA AND VOMITING: NO NAUSEA AND NO VOMITING
NAUSEA AND VOMITING: NO NAUSEA AND NO VOMITING
ANXIETY: MODERATELY ANXIOUS, OR GUARDED, SO ANXIETY IS INFERRED
AGITATION: NORMAL ACTIVITY
VISUAL DISTURBANCES: NOT PRESENT
AUDITORY DISTURBANCES: NOT PRESENT
VISUAL DISTURBANCES: NOT PRESENT
TOTAL SCORE: 0
ORIENTATION AND CLOUDING OF SENSORIUM: ORIENTED AND CAN DO SERIAL ADDITIONS
PAROXYSMAL SWEATS: NO SWEAT VISIBLE
AUDIT-C TOTAL SCORE: 4
ANXIETY: NO ANXIETY, AT EASE
AGITATION: NORMAL ACTIVITY
PAROXYSMAL SWEATS: NO SWEAT VISIBLE
HOW MANY STANDARD DRINKS CONTAINING ALCOHOL DO YOU HAVE ON A TYPICAL DAY: 1 OR 2
HEADACHE, FULLNESS IN HEAD: NOT PRESENT
PAROXYSMAL SWEATS: NO SWEAT VISIBLE
NAUSEA AND VOMITING: NO NAUSEA AND NO VOMITING
TREMOR: NO TREMOR
TREMOR: NO TREMOR
ORIENTATION AND CLOUDING OF SENSORIUM: ORIENTED AND CAN DO SERIAL ADDITIONS
ORIENTATION AND CLOUDING OF SENSORIUM: ORIENTED AND CAN DO SERIAL ADDITIONS
PAROXYSMAL SWEATS: NO SWEAT VISIBLE
AGITATION: NORMAL ACTIVITY
PAROXYSMAL SWEATS: NO SWEAT VISIBLE
ORIENTATION AND CLOUDING OF SENSORIUM: ORIENTED AND CAN DO SERIAL ADDITIONS
ANXIETY: MILDLY ANXIOUS
HEADACHE, FULLNESS IN HEAD: NOT PRESENT
AUDITORY DISTURBANCES: NOT PRESENT
PULSE: 77
TOTAL SCORE: 1
TREMOR: NO TREMOR
PAROXYSMAL SWEATS: NO SWEAT VISIBLE
ANXIETY: MILDLY ANXIOUS
TREMOR: NO TREMOR
NAUSEA AND VOMITING: NO NAUSEA AND NO VOMITING
VISUAL DISTURBANCES: NOT PRESENT
HEADACHE, FULLNESS IN HEAD: NOT PRESENT
TREMOR: NO TREMOR
TREMOR: NO TREMOR
AUDITORY DISTURBANCES: NOT PRESENT
ORIENTATION AND CLOUDING OF SENSORIUM: ORIENTED AND CAN DO SERIAL ADDITIONS
TREMOR: NO TREMOR
HEADACHE, FULLNESS IN HEAD: NOT PRESENT
VISUAL DISTURBANCES: NOT PRESENT
NAUSEA AND VOMITING: NO NAUSEA AND NO VOMITING
ORIENTATION AND CLOUDING OF SENSORIUM: ORIENTED AND CAN DO SERIAL ADDITIONS
NAUSEA AND VOMITING: NO NAUSEA AND NO VOMITING
PAROXYSMAL SWEATS: NO SWEAT VISIBLE
TREMOR: NO TREMOR
ORIENTATION AND CLOUDING OF SENSORIUM: ORIENTED AND CAN DO SERIAL ADDITIONS
ANXIETY: MILDLY ANXIOUS
AGITATION: NORMAL ACTIVITY
AGITATION: NORMAL ACTIVITY
AUDITORY DISTURBANCES: NOT PRESENT
AUDITORY DISTURBANCES: NOT PRESENT
HEADACHE, FULLNESS IN HEAD: NOT PRESENT
AUDITORY DISTURBANCES: NOT PRESENT
TOTAL SCORE: 1
AUDITORY DISTURBANCES: NOT PRESENT
VISUAL DISTURBANCES: NOT PRESENT
TREMOR: NO TREMOR
SKIP TO QUESTIONS 9-10: 0
HEADACHE, FULLNESS IN HEAD: NOT PRESENT
HOW OFTEN DO YOU HAVE A DRINK CONTAINING ALCOHOL: 2-3 TIMES A WEEK

## 2023-11-17 ASSESSMENT — PAIN SCALES - GENERAL
PAINLEVEL_OUTOF10: 8
PAINLEVEL_OUTOF10: 8
PAINLEVEL_OUTOF10: 6
PAINLEVEL_OUTOF10: 9
PAINLEVEL_OUTOF10: 6
PAINLEVEL_OUTOF10: 6
PAINLEVEL_OUTOF10: 8
PAINLEVEL_OUTOF10: 8
PAINLEVEL_OUTOF10: 9
PAINLEVEL_OUTOF10: 0 - NO PAIN
PAINLEVEL_OUTOF10: 8
PAINLEVEL_OUTOF10: 10 - WORST POSSIBLE PAIN
PAINLEVEL_OUTOF10: 8
PAINLEVEL_OUTOF10: 6

## 2023-11-17 ASSESSMENT — PAIN - FUNCTIONAL ASSESSMENT
PAIN_FUNCTIONAL_ASSESSMENT: 0-10

## 2023-11-17 ASSESSMENT — PAIN DESCRIPTION - ORIENTATION: ORIENTATION: LEFT

## 2023-11-17 ASSESSMENT — ACTIVITIES OF DAILY LIVING (ADL): LACK_OF_TRANSPORTATION: NO

## 2023-11-17 ASSESSMENT — PAIN DESCRIPTION - LOCATION: LOCATION: ABDOMEN

## 2023-11-17 NOTE — SIGNIFICANT EVENT
BP cuff and Home Monitoring   Patient meets criteria for home monitoring of blood pressure post discharge.  Met with patient to assess for availability of home BP monitor.  Patient does not have access to BP monitor at home.  Pt agreed to order home BP monitor from [employer]. BP monitor delivered to room.  Patient educated on how to use BP monitor, recording BP’s on home monitoring log and s/sx to report to her provider.  Pt verbalized understanding the above information.    Large cuff given

## 2023-11-17 NOTE — PROGRESS NOTES
"Social Work Assessment       Patient: Arlen Braga   Address: [address], [place] [address]  Phone: [phone]    Referral Reason: Alcohol Use Disorder     Prenatal Care: [facility], plans to deliver at , [facility] 3/4/23    Baby Name: \"Linh\"  [place] : N/A     Other Children: Seven year old daughter, Yelitza     Household Composition: Lives at home with dependent child     IPV/DV or Safety Concerns: Denies     Car-Seat: Yes   Safe Sleep Space: No   Safe Sleep Education: N/A     Transportation Concerns: None reported.  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  She received services through [facility] in recent years.  Ms. Braga provided consent for VERONIQUE to complete a mental health referral on her behalf    Supports: Parent, and FOB     Substance Use History: Heavy alcohol use- History of acute alcoholic pancreatitis    Toxicology Screens:     Department of Children and Family Services (DCFS):       Assessment: SW met with Ms. Braga at bedside to introduce self, and SW role.  Expectant mother was friendly, cooperative, and easy to engage in conversation.  She lives at home with her seven yr old daughter, Yelitza.  No home safety concerns.  FOB is involved, but they do not live together.      She works in a group home.  Household receives SNAP benefits, and she plans to apply for WIC.  Ms. Braga's family member gifted her a gently used car seat, stroller, and baby walker.  She stated she needs assist with safe sleep location.  SW advised expectant mother to follow-up with [facility] for assist with needed baby supplies.  If they are unable to assist SW will assist at delivery.     She " endorsed history of depression, and anxiety.  No meds or counseling currently.   Ms. Braga reports stable mood.  She stated spending time with her dog (Luz) helps decrease her stress.  Expectant mother stated she has decreased her drinking during her pregnancy, but reports daily use.  Sometimes to assist with falling asleep, and other times social drinking.  She also stated her mother drinks heavily.  Ms. Braga provided verbal consent for SW to [facility].  She is already linked to [employer] [facility] [employer], a smoking cessation program for pregnant moms.  Ms. Braga appears to have insight into how smoking, and drinking can negatively effect her baby.  She's open to discussing treatment options with [facility] staff     [facility] staff, Sylwia accepted referral, and agreed to have other [facility] staff see patient over the weekend.  SW will continue to follow, and assist as needed.    Plan: Ms. Braga is clear for discharge from  perspective    Signature: Ruby [facility]

## 2023-11-17 NOTE — CONSULTS
MFM Consult    Reason for Consult: acute alcoholic pancreatitis in pregnancy    HPI:   Arlen is here with c/o abdominal pain nausea/vomiting/diarrhea since 0500. She reports the abdominal pain as sharp, constant, left sided abdominal pain. She felt nauseated and had hot flashes all day yesterday but the vomiting and diarrhea started today. Unsure if she has had a fever. 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 She denies any VB, LOF, vaginal itching, irritation, odor, or concern for STIs. She reports good FM.      Additionally, pt has h/o acute alcoholic pancreatitis 3/2023 and admits to one MXD alcoholic beverage daily throughout pregnancy. She states this abdominal pain is similar to pain when admitted with pancreatitis.      Of note: pt had mild range BP on arrival to triage. She has no h/o HDP and denies HA, scotoma, RUQ pain, chest pain, or SOB.      The patient is being admitted for further evaluation and monitoring.     AM update:  Pt reports pain has been tolerable overnight with pain meds. Last drank alcohol on 11/15. She has been in alcohol withdrawal last March. Denies agitation, anxiety, restlessness, N/V, HA, or hallucinations at this time. Denies VB, LOF, CTX. Good FM.     Pregnancy Problems (from 10/17/23 to present)       Problem Noted Resolved    History of pancreatitis 11/16/2023 by KIRILL Levy No    Priority:  Medium      Overview Addendum 11/16/2023  5:16 PM by KIRILL Levy     Hospitalized in 3/2023         Severe alcohol use disorder (CMS/HCC) 11/16/2023 by KIRILL Levy No    Priority:  Medium      Overview Signed 11/16/2023  5:17 PM by KIRILL Levy     Admits to drinking one MXD adair can daily throughout pregnancy.          Limited prenatal care, antepartum 11/16/2023 by KIRILL Levy No    Priority:  Medium      Current every day smoker 11/16/2023 by Janet YODER  KIRILL Frazier No    Priority:  Medium      Overview Signed 2023  8:34 PM by KIRILL Levy     Reports 6 cigarettes per day         Asthma 3/22/2011 by KIRILL Levy No    Priority:  Medium      Overview Signed 2023  5:03 PM by KIRILL Levy     Never been hospitalized. Using rescue inhaler once Q night               Obstetrical History   OB History          5    Para   1    Term   1            AB   3    Living   1         SAB   3    IAB        Ectopic        Multiple        Live Births   1                 Past Medical History  Past Medical History:   Diagnosis Date    Anemia     Depression         Past Surgical History   Past Surgical History:   Procedure Laterality Date    WISDOM TOOTH EXTRACTION Bilateral     pt unsure when       Social History  Social History     Socioeconomic History    Marital status: Single     Spouse name: Not on file    Number of children: Not on file    Years of education: Not on file    Highest education level: Not on file   Occupational History    Not on file   Tobacco Use    Smoking status: Every Day     Types: Cigarettes    Smokeless tobacco: Never    Tobacco comments:     Pt states she is currently in a program to quit. Does not want smoking cessation information at this time   Substance and Sexual Activity    Alcohol use: Yes     Alcohol/week: 4.0 standard drinks of alcohol     Types: 4 Standard drinks or equivalent per week     Comment: adair sun    Drug use: Yes     Types: Marijuana    Sexual activity: Not on file   Other Topics Concern    Not on file   Social History Narrative    Not on file     Social Determinants of Health     Financial Resource Strain: Medium Risk (2023)    Overall Financial Resource Strain (CARDIA)     Difficulty of Paying Living Expenses: Somewhat hard   Food Insecurity: Not on file   Transportation Needs: No Transportation Needs (2023)    PRAPARE - Transportation     Lack of  "Transportation (Medical): No     Lack of Transportation (Non-Medical): No   Physical Activity: Not on file   Stress: Not on file   Social Connections: Not on file   Intimate Partner Violence: Not on file       Allergies  No Known Allergies    Medications  No medications prior to admission.       OBJECTIVE:   /89   Pulse 70   Temp 36.5 °C (97.7 °F) (Temporal)   Resp 18   Ht 1.651 m (5' 5\")   Wt 63 kg (138 lb 14.2 oz)   LMP 2023   SpO2 97%   BMI 23.11 kg/m²    Temp  Min: 36.5 °C (97.7 °F)  Max: 37.1 °C (98.8 °F)  Pulse  Min: 60  Max: 87  BP  Min: 128/84  Max: 154/89    Physical exam:  General:  AAOx3, No acute distress  Cardiovascular: Warm and well perfused  Respiratory: Normal respiratory effort   Abdominal:  Soft, gravid, non-tender  Extremities: Warm, well perfused    NST: Baseline 140 AGA  [place]: no CTXs     Labs:   Lab Results   Component Value Date    ABO B 2023    LABRH POS 2023    ABSCRN NEG 2023     Lab Results   Component Value Date    WBC 11.0 2023    HGB 9.1 (L) 2023    HCT 28.2 (L) 2023     2023     Lab Results   Component Value Date    GLUCOSE 88 2023     (L) 2023    K 3.4 (L) 2023     2023    CO2 21 2023    ANIONGAP 14 2023    BUN 5 (L) 2023    CREATININE 0.43 (L) 2023    EGFR >90 2023    CALCIUM 8.5 (L) 2023    ALBUMIN 3.6 2023    PROT 6.0 (L) 2023    ALKPHOS 45 2023    ALT 7 2023    AST 13 2023    BILITOT 0.5 2023     Amylase   Date/Time Value Ref Range Status   2023 01:24  (H) 29 - 103 U/L Final   2023 06:12  (H) 29 - 103 U/L Final     Lipase   Date/Time Value Ref Range Status   2023 01:24 AM 1,081 (H) 9 - 82 U/L Final   2023 06:12  (H) 9 - 82 U/L Final       ASSESSMENT AND PLAN:     27 y.o.  at 24w4d admitted with acute alcoholic pancreatitis.     Acute alcoholic " pancreatitis  - 1L fluid bolus given in triage for hypovolemia (SG 1.030, 3+ ketones), will follow with IVF at 125mL/hr until tolerating PO well  - Pain control with IV and PO opioids with plan to transition to PO Tylenol once patient out of acute pain   - Pepcid 40mg IV q24 hrs  - Amylase 167, lipase 1081 - see trend above  - NPO   - mIVF @ 125/hr  - Strict I/O     AUD with onset after birth of her 7 year old daughter, Anxiety, Depression  - Pt notes reduction of EtOH use since pregnancy but is triggered to drink as a coping mechanism for anxiety, life stress  - CIWA protocol, last score 0  - Daily thiamine and folic acid  - Gabapentin 300mg TID for both treatment of anxiety, insomnia, and risk for mild alcohol withdrawal  - Recommend adding naltexone 50mg daily to help with alcohol use reduction once no longer requiring opioid medications  - Pt states she is already engaged in individual behavioral counseling with a counselor at [facility] and is not interested in further referrals at this time  - Interested in a  referral to help her obtain a crib and pack-n-play and assistance with finding better housing     Nicotine dependency  - Nicotine patch 7mg/d    cHTN  - Pt denies h/o cHTN, however on chart review multiple mild range BPs earlier this year prior to pregnancy  - no PEC s/s  - HELLP labs neg with P:C 0.27    Fetal status: Reassuring, NST reactive today  - continue daily NSTs while inpatient   - for completion of anatomy  - PNL completed    Dispo: Inpatient monitoring for pain control, possible alcohol withdrawal.    Pt seen and discussed with M Attending, Dr. Siegel.     Danii Salinas MD, PGY-3   [facility]  Pager [phone]     Principal Problem:    Acute recurrent pancreatitis  Active Problems:    Severe alcohol use disorder (CMS/HCC)    Current every day smoker

## 2023-11-18 LAB
CA-I BLD-SCNC: 1.09 MMOL/L (ref 1.1–1.33)
ERYTHROCYTE [DISTWIDTH] IN BLOOD BY AUTOMATED COUNT: 13.5 % (ref 11.5–14.5)
HCT VFR BLD AUTO: 28.8 % (ref 36–46)
HGB BLD-MCNC: 9 G/DL (ref 12–16)
MCH RBC QN AUTO: 29.8 PG (ref 26–34)
MCHC RBC AUTO-ENTMCNC: 31.3 G/DL (ref 32–36)
MCV RBC AUTO: 95 FL (ref 80–100)
NRBC BLD-RTO: 0 /100 WBCS (ref 0–0)
PLATELET # BLD AUTO: 149 X10*3/UL (ref 150–450)
RBC # BLD AUTO: 3.02 X10*6/UL (ref 4–5.2)
WBC # BLD AUTO: 7.3 X10*3/UL (ref 4.4–11.3)

## 2023-11-18 PROCEDURE — 2500000001 HC RX 250 WO HCPCS SELF ADMINISTERED DRUGS (ALT 637 FOR MEDICARE OP)

## 2023-11-18 PROCEDURE — 2500000004 HC RX 250 GENERAL PHARMACY W/ HCPCS (ALT 636 FOR OP/ED): Performed by: STUDENT IN AN ORGANIZED HEALTH CARE EDUCATION/TRAINING PROGRAM

## 2023-11-18 PROCEDURE — 2500000004 HC RX 250 GENERAL PHARMACY W/ HCPCS (ALT 636 FOR OP/ED)

## 2023-11-18 PROCEDURE — 2500000002 HC RX 250 W HCPCS SELF ADMINISTERED DRUGS (ALT 637 FOR MEDICARE OP, ALT 636 FOR OP/ED)

## 2023-11-18 PROCEDURE — 85027 COMPLETE CBC AUTOMATED: CPT | Performed by: STUDENT IN AN ORGANIZED HEALTH CARE EDUCATION/TRAINING PROGRAM

## 2023-11-18 PROCEDURE — 99233 SBSQ HOSP IP/OBS HIGH 50: CPT | Performed by: STUDENT IN AN ORGANIZED HEALTH CARE EDUCATION/TRAINING PROGRAM

## 2023-11-18 PROCEDURE — S4991 NICOTINE PATCH NONLEGEND: HCPCS

## 2023-11-18 PROCEDURE — 59025 FETAL NON-STRESS TEST: CPT | Performed by: STUDENT IN AN ORGANIZED HEALTH CARE EDUCATION/TRAINING PROGRAM

## 2023-11-18 PROCEDURE — 59025 FETAL NON-STRESS TEST: CPT | Mod: GC | Performed by: STUDENT IN AN ORGANIZED HEALTH CARE EDUCATION/TRAINING PROGRAM

## 2023-11-18 PROCEDURE — 1210000001 HC SEMI-PRIVATE ROOM DAILY

## 2023-11-18 PROCEDURE — 36415 COLL VENOUS BLD VENIPUNCTURE: CPT | Performed by: STUDENT IN AN ORGANIZED HEALTH CARE EDUCATION/TRAINING PROGRAM

## 2023-11-18 PROCEDURE — 82330 ASSAY OF CALCIUM: CPT | Performed by: STUDENT IN AN ORGANIZED HEALTH CARE EDUCATION/TRAINING PROGRAM

## 2023-11-18 RX ORDER — HYDROMORPHONE HYDROCHLORIDE 1 MG/ML
0.2 INJECTION, SOLUTION INTRAMUSCULAR; INTRAVENOUS; SUBCUTANEOUS EVERY 4 HOURS PRN
Status: DISCONTINUED | OUTPATIENT
Start: 2023-11-18 | End: 2023-11-21

## 2023-11-18 RX ORDER — ACETAMINOPHEN 500 MG
5 TABLET ORAL NIGHTLY PRN
Status: DISCONTINUED | OUTPATIENT
Start: 2023-11-18 | End: 2023-11-22 | Stop reason: HOSPADM

## 2023-11-18 RX ADMIN — SODIUM CHLORIDE, POTASSIUM CHLORIDE, SODIUM LACTATE AND CALCIUM CHLORIDE 100 ML/HR: 600; 310; 30; 20 INJECTION, SOLUTION INTRAVENOUS at 07:06

## 2023-11-18 RX ADMIN — SODIUM CHLORIDE, POTASSIUM CHLORIDE, SODIUM LACTATE AND CALCIUM CHLORIDE 100 ML/HR: 600; 310; 30; 20 INJECTION, SOLUTION INTRAVENOUS at 18:26

## 2023-11-18 RX ADMIN — HYDROMORPHONE HYDROCHLORIDE 0.2 MG: 1 INJECTION, SOLUTION INTRAMUSCULAR; INTRAVENOUS; SUBCUTANEOUS at 16:06

## 2023-11-18 RX ADMIN — PRENATAL VIT W/ FE FUMARATE-FA TAB 27-0.8 MG 1 TABLET: 27-0.8 TAB at 08:22

## 2023-11-18 RX ADMIN — HYDROMORPHONE HYDROCHLORIDE 1 MG: 2 TABLET ORAL at 14:14

## 2023-11-18 RX ADMIN — HYDROMORPHONE HYDROCHLORIDE 0.2 MG: 1 INJECTION, SOLUTION INTRAMUSCULAR; INTRAVENOUS; SUBCUTANEOUS at 20:58

## 2023-11-18 RX ADMIN — FAMOTIDINE 40 MG: 10 INJECTION INTRAVENOUS at 08:22

## 2023-11-18 RX ADMIN — GABAPENTIN 300 MG: 300 CAPSULE ORAL at 08:22

## 2023-11-18 RX ADMIN — GABAPENTIN 300 MG: 300 CAPSULE ORAL at 02:15

## 2023-11-18 RX ADMIN — HYDROMORPHONE HYDROCHLORIDE 1 MG: 2 TABLET ORAL at 23:53

## 2023-11-18 RX ADMIN — SIMETHICONE 80 MG: 80 TABLET, CHEWABLE ORAL at 14:20

## 2023-11-18 RX ADMIN — HYDROMORPHONE HYDROCHLORIDE 1 MG: 2 TABLET ORAL at 19:19

## 2023-11-18 RX ADMIN — ACETAMINOPHEN 975 MG: 325 TABLET ORAL at 20:57

## 2023-11-18 RX ADMIN — HYDROMORPHONE HYDROCHLORIDE 1 MG: 2 TABLET ORAL at 02:20

## 2023-11-18 RX ADMIN — GABAPENTIN 300 MG: 300 CAPSULE ORAL at 17:07

## 2023-11-18 RX ADMIN — Medication 1 TABLET: at 08:20

## 2023-11-18 RX ADMIN — SIMETHICONE 80 MG: 80 TABLET, CHEWABLE ORAL at 18:26

## 2023-11-18 RX ADMIN — ACETAMINOPHEN 975 MG: 325 TABLET ORAL at 03:37

## 2023-11-18 RX ADMIN — HYDROMORPHONE HYDROCHLORIDE 1 MG: 2 TABLET ORAL at 08:22

## 2023-11-18 RX ADMIN — ACETAMINOPHEN 975 MG: 325 TABLET ORAL at 14:14

## 2023-11-18 RX ADMIN — PSYLLIUM HUSK 1 PACKET: 3.4 POWDER ORAL at 10:27

## 2023-11-18 RX ADMIN — HYDROMORPHONE HYDROCHLORIDE 0.4 MG: 1 INJECTION, SOLUTION INTRAMUSCULAR; INTRAVENOUS; SUBCUTANEOUS at 10:33

## 2023-11-18 RX ADMIN — FOLIC ACID 1 MG: 1 TABLET ORAL at 08:22

## 2023-11-18 RX ADMIN — ACETAMINOPHEN 975 MG: 325 TABLET ORAL at 08:22

## 2023-11-18 RX ADMIN — NICOTINE 7 MG/24 HR DAILY TRANSDERMAL PATCH 1 PATCH: at 08:21

## 2023-11-18 RX ADMIN — THIAMINE HYDROCHLORIDE 100 MG: 100 INJECTION, SOLUTION INTRAMUSCULAR; INTRAVENOUS at 08:22

## 2023-11-18 ASSESSMENT — LIFESTYLE VARIABLES
AGITATION: NORMAL ACTIVITY
TOTAL SCORE: 0
PAROXYSMAL SWEATS: NO SWEAT VISIBLE
NAUSEA AND VOMITING: NO NAUSEA AND NO VOMITING
AGITATION: NORMAL ACTIVITY
TOTAL SCORE: 0
ANXIETY: NO ANXIETY, AT EASE
HEADACHE, FULLNESS IN HEAD: NOT PRESENT
TREMOR: NO TREMOR
TREMOR: NO TREMOR
ORIENTATION AND CLOUDING OF SENSORIUM: ORIENTED AND CAN DO SERIAL ADDITIONS
VISUAL DISTURBANCES: NOT PRESENT
VISUAL DISTURBANCES: NOT PRESENT
PAROXYSMAL SWEATS: NO SWEAT VISIBLE
PULSE: 89
ANXIETY: NO ANXIETY, AT EASE
NAUSEA AND VOMITING: NO NAUSEA AND NO VOMITING
AUDITORY DISTURBANCES: NOT PRESENT
PAROXYSMAL SWEATS: NO SWEAT VISIBLE
ORIENTATION AND CLOUDING OF SENSORIUM: ORIENTED AND CAN DO SERIAL ADDITIONS
AUDITORY DISTURBANCES: NOT PRESENT
ANXIETY: NO ANXIETY, AT EASE
PULSE: 90
BLOOD PRESSURE: 127/82
NAUSEA AND VOMITING: NO NAUSEA AND NO VOMITING
ORIENTATION AND CLOUDING OF SENSORIUM: ORIENTED AND CAN DO SERIAL ADDITIONS
HEADACHE, FULLNESS IN HEAD: NOT PRESENT
TOTAL SCORE: 0
HEADACHE, FULLNESS IN HEAD: NOT PRESENT
TOTAL SCORE: 0
TOTAL SCORE: 0
PAROXYSMAL SWEATS: NO SWEAT VISIBLE
TREMOR: NO TREMOR
VISUAL DISTURBANCES: NOT PRESENT
ANXIETY: NO ANXIETY, AT EASE
TOTAL SCORE: 0
AUDITORY DISTURBANCES: NOT PRESENT
PAROXYSMAL SWEATS: NO SWEAT VISIBLE
AUDITORY DISTURBANCES: NOT PRESENT
NAUSEA AND VOMITING: NO NAUSEA AND NO VOMITING
HEADACHE, FULLNESS IN HEAD: NOT PRESENT
AGITATION: NORMAL ACTIVITY
NAUSEA AND VOMITING: NO NAUSEA AND NO VOMITING
ORIENTATION AND CLOUDING OF SENSORIUM: ORIENTED AND CAN DO SERIAL ADDITIONS
AUDITORY DISTURBANCES: NOT PRESENT
VISUAL DISTURBANCES: NOT PRESENT
AUDITORY DISTURBANCES: NOT PRESENT
ORIENTATION AND CLOUDING OF SENSORIUM: ORIENTED AND CAN DO SERIAL ADDITIONS
ANXIETY: NO ANXIETY, AT EASE
AGITATION: NORMAL ACTIVITY
TREMOR: NO TREMOR
VISUAL DISTURBANCES: NOT PRESENT
VISUAL DISTURBANCES: NOT PRESENT
NAUSEA AND VOMITING: NO NAUSEA AND NO VOMITING
HEADACHE, FULLNESS IN HEAD: NOT PRESENT
ANXIETY: NO ANXIETY, AT EASE
TREMOR: NO TREMOR
PAROXYSMAL SWEATS: NO SWEAT VISIBLE
HEADACHE, FULLNESS IN HEAD: NOT PRESENT
ORIENTATION AND CLOUDING OF SENSORIUM: ORIENTED AND CAN DO SERIAL ADDITIONS
TREMOR: NO TREMOR

## 2023-11-18 ASSESSMENT — PAIN DESCRIPTION - DESCRIPTORS
DESCRIPTORS: ACHING;CRAMPING;SORE;THROBBING
DESCRIPTORS: ACHING;CRAMPING

## 2023-11-18 ASSESSMENT — PAIN SCALES - GENERAL
PAINLEVEL_OUTOF10: 6
PAINLEVEL_OUTOF10: 6
PAINLEVEL_OUTOF10: 10 - WORST POSSIBLE PAIN
PAINLEVEL_OUTOF10: 9
PAINLEVEL_OUTOF10: 9
PAINLEVEL_OUTOF10: 0 - NO PAIN
PAINLEVEL_OUTOF10: 9
PAINLEVEL_OUTOF10: 8
PAINLEVEL_OUTOF10: 0 - NO PAIN
PAINLEVEL_OUTOF10: 6
PAINLEVEL_OUTOF10: 9
PAINLEVEL_OUTOF10: 10 - WORST POSSIBLE PAIN
PAINLEVEL_OUTOF10: 9

## 2023-11-18 ASSESSMENT — PAIN DESCRIPTION - LOCATION
LOCATION: ABDOMEN

## 2023-11-18 ASSESSMENT — PAIN - FUNCTIONAL ASSESSMENT
PAIN_FUNCTIONAL_ASSESSMENT: 0-10

## 2023-11-18 NOTE — PROGRESS NOTES
"ANTEPARTUM PROGRESS NOTE   2023, 7:02 AM     SUBJECTIVE: No acute events overnight. Reports pain slightly improved overnight. Denies painful contractions, VB, LOF. Reports normal fetal movement.     OBJECTIVE:   BP (!) 125/90   Pulse 79   Temp 37 °C (98.6 °F) (Temporal)   Resp 18   Ht 1.651 m (5' 5\")   Wt 63 kg (138 lb 14.2 oz)   LMP 2023   SpO2 99%   BMI 23.11 kg/m²    Temp  Min: 36.4 °C (97.5 °F)  Max: 37.1 °C (98.8 °F)  Pulse  Min: 76  Max: 82  BP  Min: 114/80  Max: 150/97    General:  AAOx3, No acute distress  Cardiovascular: Warm and well perfused  Respiratory: Normal respiratory effort   Abdominal:  Soft, gravid, tender to palpation in the epigastric region, no rebound or guarding  Extremities: No edema, no calf tenderness     NST: Baseline 145, accelerations +, no decelerations, mod variability   [place]: no CTXs     Labs:   Lab Results   Component Value Date    WBC 9.7 2023    HGB 9.0 (L) 2023    HCT 29.0 (L) 2023     2023     Lab Results   Component Value Date    GLUCOSE 76 2023     (L) 2023    K 4.0 2023     2023    CO2 23 2023    ANIONGAP 12 2023    BUN 4 (L) 2023    CREATININE 0.45 (L) 2023    EGFR >90 2023    CALCIUM 8.1 (L) 2023    ALBUMIN 3.1 (L) 2023    PROT 5.2 (L) 2023    ALKPHOS 42 2023    ALT 5 (L) 2023    AST 12 2023    BILITOT 0.5 2023     Lab Results   Component Value Date    AMYLASE 204 (H) 2023    LIPASE 1,955 (H) 2023     Lipase   Date/Time Value Ref Range Status   2023 03:06 PM 1,955 (H) 9 - 82 U/L Final   2023 10:24 AM 1,668 (H) 9 - 82 U/L Final   2023 01:24 AM 1,081 (H) 9 - 82 U/L Final   2023 06:12  (H) 9 - 82 U/L Final         ASSESSMENT AND PLAN:     27 y.o.  at 24w4d admitted with acute alcoholic pancreatitis.      Acute alcoholic pancreatitis  - 1L fluid bolus -> now on mIVF @100 "   - Pain control with IV and PO opioids with plan to transition to PO Tylenol once patient out of acute pain   - Pepcid 40mg IV q24 hrs  - Lipase last 1955 - see trend above  - low fait diet as tolerated  - mIVF @ 125/hr  - Strict I/O     AUD with onset after birth of her 7 year old daughter, Anxiety, Depression  - Pt notes reduction of EtOH use since pregnancy but is triggered to drink as a coping mechanism for anxiety, life stress  - CIWA protocol, last score 0  - Daily thiamine and folic acid  - Gabapentin 300mg TID for both treatment of anxiety, insomnia, and risk for mild alcohol withdrawal  - Recommend adding naltexone 50mg daily to help with alcohol use reduction once no longer requiring opioid medications  - Pt states she is already engaged in individual behavioral counseling with a counselor at [facility] and is not interested in further referrals at this time  - Interested in a  referral to help her obtain a crib and pack-n-play and assistance with finding better housing     Nicotine dependency  - Nicotine patch 7mg/d     cHTN  - Pt denies h/o cHTN, however on chart review multiple mild range BPs earlier this year prior to pregnancy  - no PEC s/s  - HELLP labs neg with P:C 0.27     Fetal status: Reassuring, NST reactive today  - continue daily NSTs while inpatient   - for completion of anatomy  - PNL completed     Dispo: Inpatient monitoring for pain control, possible alcohol withdrawal.    Pt seen and discussed with M Attending, Dr. Siegel.     Danii Salinas MD, PGY-3   [facility]  Pager [phone]     Principal Problem:    Acute recurrent pancreatitis  Active Problems:    Severe alcohol use disorder (CMS/HCC)    Current every day smoker

## 2023-11-18 NOTE — CARE PLAN
The patient's goals for the shift include pain management    The clinical goals for the shift include better pain control

## 2023-11-19 LAB
ALBUMIN SERPL BCP-MCNC: 3 G/DL (ref 3.4–5)
ALP SERPL-CCNC: 49 U/L (ref 33–110)
ALT SERPL W P-5'-P-CCNC: 8 U/L (ref 7–45)
ANION GAP SERPL CALC-SCNC: 10 MMOL/L (ref 10–20)
AST SERPL W P-5'-P-CCNC: 15 U/L (ref 9–39)
BILIRUB SERPL-MCNC: 0.3 MG/DL (ref 0–1.2)
BUN SERPL-MCNC: 5 MG/DL (ref 6–23)
CA-I BLD-SCNC: 1.15 MMOL/L (ref 1.1–1.33)
CALCIUM SERPL-MCNC: 8.2 MG/DL (ref 8.6–10.6)
CHLORIDE SERPL-SCNC: 105 MMOL/L (ref 98–107)
CO2 SERPL-SCNC: 23 MMOL/L (ref 21–32)
CREAT SERPL-MCNC: 0.43 MG/DL (ref 0.5–1.05)
ERYTHROCYTE [DISTWIDTH] IN BLOOD BY AUTOMATED COUNT: 13.5 % (ref 11.5–14.5)
GFR SERPL CREATININE-BSD FRML MDRD: >90 ML/MIN/1.73M*2
GLUCOSE SERPL-MCNC: 81 MG/DL (ref 74–99)
HCT VFR BLD AUTO: 25.8 % (ref 36–46)
HGB BLD-MCNC: 8.1 G/DL (ref 12–16)
LIPASE SERPL-CCNC: 811 U/L (ref 9–82)
MCH RBC QN AUTO: 30.1 PG (ref 26–34)
MCHC RBC AUTO-ENTMCNC: 31.4 G/DL (ref 32–36)
MCV RBC AUTO: 96 FL (ref 80–100)
NRBC BLD-RTO: 0 /100 WBCS (ref 0–0)
PLATELET # BLD AUTO: 137 X10*3/UL (ref 150–450)
POTASSIUM SERPL-SCNC: 3.3 MMOL/L (ref 3.5–5.3)
PROT SERPL-MCNC: 5.3 G/DL (ref 6.4–8.2)
RBC # BLD AUTO: 2.69 X10*6/UL (ref 4–5.2)
SODIUM SERPL-SCNC: 135 MMOL/L (ref 136–145)
WBC # BLD AUTO: 5.5 X10*3/UL (ref 4.4–11.3)

## 2023-11-19 PROCEDURE — 83690 ASSAY OF LIPASE: CPT | Performed by: STUDENT IN AN ORGANIZED HEALTH CARE EDUCATION/TRAINING PROGRAM

## 2023-11-19 PROCEDURE — 59025 FETAL NON-STRESS TEST: CPT | Performed by: STUDENT IN AN ORGANIZED HEALTH CARE EDUCATION/TRAINING PROGRAM

## 2023-11-19 PROCEDURE — 2500000002 HC RX 250 W HCPCS SELF ADMINISTERED DRUGS (ALT 637 FOR MEDICARE OP, ALT 636 FOR OP/ED)

## 2023-11-19 PROCEDURE — 99233 SBSQ HOSP IP/OBS HIGH 50: CPT | Performed by: STUDENT IN AN ORGANIZED HEALTH CARE EDUCATION/TRAINING PROGRAM

## 2023-11-19 PROCEDURE — 85027 COMPLETE CBC AUTOMATED: CPT | Performed by: STUDENT IN AN ORGANIZED HEALTH CARE EDUCATION/TRAINING PROGRAM

## 2023-11-19 PROCEDURE — 2500000004 HC RX 250 GENERAL PHARMACY W/ HCPCS (ALT 636 FOR OP/ED): Performed by: STUDENT IN AN ORGANIZED HEALTH CARE EDUCATION/TRAINING PROGRAM

## 2023-11-19 PROCEDURE — S4991 NICOTINE PATCH NONLEGEND: HCPCS

## 2023-11-19 PROCEDURE — 2500000001 HC RX 250 WO HCPCS SELF ADMINISTERED DRUGS (ALT 637 FOR MEDICARE OP)

## 2023-11-19 PROCEDURE — 36415 COLL VENOUS BLD VENIPUNCTURE: CPT | Performed by: STUDENT IN AN ORGANIZED HEALTH CARE EDUCATION/TRAINING PROGRAM

## 2023-11-19 PROCEDURE — 1210000001 HC SEMI-PRIVATE ROOM DAILY

## 2023-11-19 PROCEDURE — 82330 ASSAY OF CALCIUM: CPT | Performed by: STUDENT IN AN ORGANIZED HEALTH CARE EDUCATION/TRAINING PROGRAM

## 2023-11-19 PROCEDURE — 80053 COMPREHEN METABOLIC PANEL: CPT | Performed by: STUDENT IN AN ORGANIZED HEALTH CARE EDUCATION/TRAINING PROGRAM

## 2023-11-19 PROCEDURE — 59025 FETAL NON-STRESS TEST: CPT | Mod: GC | Performed by: STUDENT IN AN ORGANIZED HEALTH CARE EDUCATION/TRAINING PROGRAM

## 2023-11-19 PROCEDURE — 2500000004 HC RX 250 GENERAL PHARMACY W/ HCPCS (ALT 636 FOR OP/ED)

## 2023-11-19 RX ORDER — DIPHENHYDRAMINE HCL 25 MG
25 CAPSULE ORAL ONCE
Status: COMPLETED | OUTPATIENT
Start: 2023-11-19 | End: 2023-11-19

## 2023-11-19 RX ADMIN — HYDROMORPHONE HYDROCHLORIDE 1 MG: 2 TABLET ORAL at 14:42

## 2023-11-19 RX ADMIN — ACETAMINOPHEN 975 MG: 325 TABLET ORAL at 20:54

## 2023-11-19 RX ADMIN — ACETAMINOPHEN 975 MG: 325 TABLET ORAL at 14:42

## 2023-11-19 RX ADMIN — HYDROMORPHONE HYDROCHLORIDE 1 MG: 2 TABLET ORAL at 07:09

## 2023-11-19 RX ADMIN — THIAMINE HYDROCHLORIDE 100 MG: 100 INJECTION, SOLUTION INTRAMUSCULAR; INTRAVENOUS at 09:04

## 2023-11-19 RX ADMIN — GABAPENTIN 300 MG: 300 CAPSULE ORAL at 09:03

## 2023-11-19 RX ADMIN — HYDROMORPHONE HYDROCHLORIDE 0.2 MG: 1 INJECTION, SOLUTION INTRAMUSCULAR; INTRAVENOUS; SUBCUTANEOUS at 09:03

## 2023-11-19 RX ADMIN — Medication 5 MG: at 20:56

## 2023-11-19 RX ADMIN — ACETAMINOPHEN 975 MG: 325 TABLET ORAL at 09:03

## 2023-11-19 RX ADMIN — GABAPENTIN 300 MG: 300 CAPSULE ORAL at 17:25

## 2023-11-19 RX ADMIN — HYDROMORPHONE HYDROCHLORIDE 0.2 MG: 1 INJECTION, SOLUTION INTRAMUSCULAR; INTRAVENOUS; SUBCUTANEOUS at 15:53

## 2023-11-19 RX ADMIN — SIMETHICONE 80 MG: 80 TABLET, CHEWABLE ORAL at 20:32

## 2023-11-19 RX ADMIN — DIPHENHYDRAMINE HYDROCHLORIDE 25 MG: 25 CAPSULE ORAL at 23:06

## 2023-11-19 RX ADMIN — HYDROMORPHONE HYDROCHLORIDE 1 MG: 2 TABLET ORAL at 20:32

## 2023-11-19 RX ADMIN — GABAPENTIN 300 MG: 300 CAPSULE ORAL at 00:10

## 2023-11-19 RX ADMIN — SODIUM CHLORIDE, POTASSIUM CHLORIDE, SODIUM LACTATE AND CALCIUM CHLORIDE 100 ML/HR: 600; 310; 30; 20 INJECTION, SOLUTION INTRAVENOUS at 08:31

## 2023-11-19 RX ADMIN — PRENATAL VIT W/ FE FUMARATE-FA TAB 27-0.8 MG 1 TABLET: 27-0.8 TAB at 09:03

## 2023-11-19 RX ADMIN — NICOTINE 7 MG/24 HR DAILY TRANSDERMAL PATCH 1 PATCH: at 09:04

## 2023-11-19 RX ADMIN — DIPHENHYDRAMINE HYDROCHLORIDE 25 MG: 25 CAPSULE ORAL at 00:10

## 2023-11-19 RX ADMIN — Medication 1 TABLET: at 09:03

## 2023-11-19 RX ADMIN — FAMOTIDINE 40 MG: 10 INJECTION INTRAVENOUS at 09:05

## 2023-11-19 RX ADMIN — FOLIC ACID 1 MG: 1 TABLET ORAL at 09:03

## 2023-11-19 RX ADMIN — ACETAMINOPHEN 975 MG: 325 TABLET ORAL at 02:14

## 2023-11-19 ASSESSMENT — LIFESTYLE VARIABLES
ORIENTATION AND CLOUDING OF SENSORIUM: ORIENTED AND CAN DO SERIAL ADDITIONS
TOTAL SCORE: 0
NAUSEA AND VOMITING: NO NAUSEA AND NO VOMITING
AUDITORY DISTURBANCES: NOT PRESENT
AUDITORY DISTURBANCES: NOT PRESENT
AGITATION: NORMAL ACTIVITY
VISUAL DISTURBANCES: NOT PRESENT
ORIENTATION AND CLOUDING OF SENSORIUM: ORIENTED AND CAN DO SERIAL ADDITIONS
TOTAL SCORE: 3
AGITATION: NORMAL ACTIVITY
AGITATION: NORMAL ACTIVITY
TOTAL SCORE: 0
AGITATION: NORMAL ACTIVITY
ANXIETY: NO ANXIETY, AT EASE
HEADACHE, FULLNESS IN HEAD: NOT PRESENT
BLOOD PRESSURE: 127/81
ANXIETY: NO ANXIETY, AT EASE
NAUSEA AND VOMITING: NO NAUSEA AND NO VOMITING
TREMOR: NO TREMOR
ANXIETY: NO ANXIETY, AT EASE
AUDITORY DISTURBANCES: NOT PRESENT
NAUSEA AND VOMITING: NO NAUSEA AND NO VOMITING
ANXIETY: NO ANXIETY, AT EASE
HEADACHE, FULLNESS IN HEAD: NOT PRESENT
AUDITORY DISTURBANCES: NOT PRESENT
ANXIETY: NO ANXIETY, AT EASE
TREMOR: NO TREMOR
TOTAL SCORE: 1
PAROXYSMAL SWEATS: NO SWEAT VISIBLE
AUDITORY DISTURBANCES: NOT PRESENT
PULSE: 78
TOTAL SCORE: 0
ORIENTATION AND CLOUDING OF SENSORIUM: ORIENTED AND CAN DO SERIAL ADDITIONS
PAROXYSMAL SWEATS: NO SWEAT VISIBLE
VISUAL DISTURBANCES: NOT PRESENT
NAUSEA AND VOMITING: NO NAUSEA AND NO VOMITING
AUDITORY DISTURBANCES: NOT PRESENT
PAROXYSMAL SWEATS: NO SWEAT VISIBLE
TOTAL SCORE: 0
ORIENTATION AND CLOUDING OF SENSORIUM: ORIENTED AND CAN DO SERIAL ADDITIONS
NAUSEA AND VOMITING: NO NAUSEA AND NO VOMITING
PAROXYSMAL SWEATS: NO SWEAT VISIBLE
ORIENTATION AND CLOUDING OF SENSORIUM: ORIENTED AND CAN DO SERIAL ADDITIONS
PAROXYSMAL SWEATS: NO SWEAT VISIBLE
VISUAL DISTURBANCES: NOT PRESENT
TREMOR: NO TREMOR
ORIENTATION AND CLOUDING OF SENSORIUM: ORIENTED AND CAN DO SERIAL ADDITIONS
HEADACHE, FULLNESS IN HEAD: NOT PRESENT
HEADACHE, FULLNESS IN HEAD: VERY MILD
PULSE: 81
PAROXYSMAL SWEATS: NO SWEAT VISIBLE
TREMOR: NO TREMOR
ANXIETY: NO ANXIETY, AT EASE
BLOOD PRESSURE: 137/89
HEADACHE, FULLNESS IN HEAD: NOT PRESENT
VISUAL DISTURBANCES: NOT PRESENT
AGITATION: NORMAL ACTIVITY
VISUAL DISTURBANCES: NOT PRESENT
TREMOR: NO TREMOR
NAUSEA AND VOMITING: NO NAUSEA AND NO VOMITING
TREMOR: NO TREMOR
VISUAL DISTURBANCES: NOT PRESENT
AGITATION: NORMAL ACTIVITY
HEADACHE, FULLNESS IN HEAD: MODERATE

## 2023-11-19 ASSESSMENT — PAIN - FUNCTIONAL ASSESSMENT
PAIN_FUNCTIONAL_ASSESSMENT: 0-10

## 2023-11-19 ASSESSMENT — PAIN SCALES - GENERAL
PAINLEVEL_OUTOF10: 8
PAINLEVEL_OUTOF10: 5 - MODERATE PAIN
PAINLEVEL_OUTOF10: 7
PAINLEVEL_OUTOF10: 7
PAINLEVEL_OUTOF10: 8
PAINLEVEL_OUTOF10: 0 - NO PAIN
PAINLEVEL_OUTOF10: 9
PAINLEVEL_OUTOF10: 9
PAINLEVEL_OUTOF10: 5 - MODERATE PAIN
PAINLEVEL_OUTOF10: 0 - NO PAIN

## 2023-11-19 ASSESSMENT — PAIN DESCRIPTION - DESCRIPTORS
DESCRIPTORS: ACHING;SORE
DESCRIPTORS: ACHING;CRAMPING;THROBBING
DESCRIPTORS: ACHING
DESCRIPTORS: ACHING;CRAMPING;THROBBING
DESCRIPTORS: SPASM;SHOOTING
DESCRIPTORS: ACHING;CRAMPING;THROBBING

## 2023-11-19 ASSESSMENT — PAIN DESCRIPTION - LOCATION
LOCATION: ABDOMEN

## 2023-11-19 NOTE — PROGRESS NOTES
"ANTEPARTUM PROGRESS NOTE   2023, 7:07 AM     SUBJECTIVE: No acute events overnight. Reports pain improved overnight. Tolerate broth. Denies painful contractions, VB, LOF. Reports normal fetal movement.     OBJECTIVE:   /74   Pulse 76   Temp 36.6 °C (97.9 °F)   Resp 18   Ht 1.651 m (5' 5\")   Wt 63 kg (138 lb 14.2 oz)   LMP 2023   SpO2 98%   BMI 23.11 kg/m²    Temp  Min: 36.2 °C (97.2 °F)  Max: 37.1 °C (98.8 °F)  Pulse  Min: 70  Max: 89  BP  Min: 124/76  Max: 145/89    General:  AAOx3, No acute distress  Cardiovascular: Warm and well perfused  Respiratory: Normal respiratory effort   Abdominal:  Soft, gravid, tender to palpation in the epigastric region, no rebound or guarding  Extremities: No edema, no calf tenderness     NST: Baseline 145, accelerations +, no decelerations, mod variability   [place]: no CTXs     Labs:   Lab Results   Component Value Date    WBC 7.3 2023    HGB 9.0 (L) 2023    HCT 28.8 (L) 2023     (L) 2023     Lab Results   Component Value Date    GLUCOSE 76 2023     (L) 2023    K 4.0 2023     2023    CO2 23 2023    ANIONGAP 12 2023    BUN 4 (L) 2023    CREATININE 0.45 (L) 2023    EGFR >90 2023    CALCIUM 8.1 (L) 2023    ALBUMIN 3.1 (L) 2023    PROT 5.2 (L) 2023    ALKPHOS 42 2023    ALT 5 (L) 2023    AST 12 2023    BILITOT 0.5 2023     Lab Results   Component Value Date    AMYLASE 204 (H) 2023    LIPASE 1,955 (H) 2023     Lipase   Date/Time Value Ref Range Status   2023 03:06 PM 1,955 (H) 9 - 82 U/L Final   2023 10:24 AM 1,668 (H) 9 - 82 U/L Final   2023 01:24 AM 1,081 (H) 9 - 82 U/L Final   2023 06:12  (H) 9 - 82 U/L Final         ASSESSMENT AND PLAN:     27 y.o.  at 24w6d admitted with acute alcoholic pancreatitis.      Acute alcoholic pancreatitis  - 1L fluid bolus -> now on mIVF @100   - " Pain control with IV and PO opioids with plan to transition to PO Tylenol once patient out of acute pain   - Pepcid 40mg IV q24 hrs  - Lipase last 1955 - see trend above  - low fait diet as tolerated  - mIVF @ 100/hr  - Strict I/O     AUD with onset after birth of her 7 year old daughter, Anxiety, Depression  - Pt notes reduction of EtOH use since pregnancy but is triggered to drink as a coping mechanism for anxiety, life stress  - CIWA protocol, last score 0  - Daily thiamine and folic acid  - Gabapentin 300mg TID for both treatment of anxiety, insomnia, and risk for mild alcohol withdrawal  - Recommend adding naltexone 50mg daily to help with alcohol use reduction once no longer requiring opioid medications  - Pt states she is already engaged in individual behavioral counseling with a counselor at [facility] and is not interested in further referrals at this time  - Interested in a  referral to help her obtain a crib and pack-n-play and assistance with finding better housing  - Naltrexone at discharge     Nicotine dependency  - Nicotine patch 7mg/d     cHTN  - Pt denies h/o cHTN, however on chart review multiple mild range BPs earlier this year prior to pregnancy  - no PEC s/s  - HELLP labs neg with P:C 0.27     Fetal status: Reassuring, NST reactive today  - continue daily NSTs while inpatient   - for completion of anatomy  - PNL completed     Dispo: Inpatient monitoring for pain control, possible alcohol withdrawal.    Pt seen and discussed with M Attending, Dr. Siegel.     Danii Salinas MD, PGY-3   [facility]  Pager [phone]     Principal Problem:    Acute recurrent pancreatitis  Active Problems:    Severe alcohol use disorder (CMS/HCC)    Current every day smoker

## 2023-11-20 LAB
ALBUMIN SERPL BCP-MCNC: 3 G/DL (ref 3.4–5)
ALP SERPL-CCNC: 63 U/L (ref 33–110)
ALT SERPL W P-5'-P-CCNC: 13 U/L (ref 7–45)
ANION GAP SERPL CALC-SCNC: 12 MMOL/L (ref 10–20)
AST SERPL W P-5'-P-CCNC: 23 U/L (ref 9–39)
BILIRUB SERPL-MCNC: 0.2 MG/DL (ref 0–1.2)
BUN SERPL-MCNC: 5 MG/DL (ref 6–23)
CA-I BLD-SCNC: 1.14 MMOL/L (ref 1.1–1.33)
CALCIUM SERPL-MCNC: 8.2 MG/DL (ref 8.6–10.6)
CHLORIDE SERPL-SCNC: 106 MMOL/L (ref 98–107)
CO2 SERPL-SCNC: 24 MMOL/L (ref 21–32)
CREAT SERPL-MCNC: 0.46 MG/DL (ref 0.5–1.05)
ERYTHROCYTE [DISTWIDTH] IN BLOOD BY AUTOMATED COUNT: 13.1 % (ref 11.5–14.5)
GFR SERPL CREATININE-BSD FRML MDRD: >90 ML/MIN/1.73M*2
GLUCOSE SERPL-MCNC: 60 MG/DL (ref 74–99)
HCT VFR BLD AUTO: 25.2 % (ref 36–46)
HGB BLD-MCNC: 8.3 G/DL (ref 12–16)
LIPASE SERPL-CCNC: 723 U/L (ref 9–82)
MCH RBC QN AUTO: 31.1 PG (ref 26–34)
MCHC RBC AUTO-ENTMCNC: 32.9 G/DL (ref 32–36)
MCV RBC AUTO: 94 FL (ref 80–100)
NRBC BLD-RTO: 0 /100 WBCS (ref 0–0)
PLATELET # BLD AUTO: 141 X10*3/UL (ref 150–450)
POTASSIUM SERPL-SCNC: 3.6 MMOL/L (ref 3.5–5.3)
PROT SERPL-MCNC: 5.1 G/DL (ref 6.4–8.2)
RBC # BLD AUTO: 2.67 X10*6/UL (ref 4–5.2)
SODIUM SERPL-SCNC: 138 MMOL/L (ref 136–145)
WBC # BLD AUTO: 4.7 X10*3/UL (ref 4.4–11.3)

## 2023-11-20 PROCEDURE — 2500000004 HC RX 250 GENERAL PHARMACY W/ HCPCS (ALT 636 FOR OP/ED): Performed by: STUDENT IN AN ORGANIZED HEALTH CARE EDUCATION/TRAINING PROGRAM

## 2023-11-20 PROCEDURE — 59025 FETAL NON-STRESS TEST: CPT | Mod: GC | Performed by: STUDENT IN AN ORGANIZED HEALTH CARE EDUCATION/TRAINING PROGRAM

## 2023-11-20 PROCEDURE — 2500000004 HC RX 250 GENERAL PHARMACY W/ HCPCS (ALT 636 FOR OP/ED)

## 2023-11-20 PROCEDURE — S4991 NICOTINE PATCH NONLEGEND: HCPCS

## 2023-11-20 PROCEDURE — 1210000001 HC SEMI-PRIVATE ROOM DAILY

## 2023-11-20 PROCEDURE — 36415 COLL VENOUS BLD VENIPUNCTURE: CPT | Performed by: STUDENT IN AN ORGANIZED HEALTH CARE EDUCATION/TRAINING PROGRAM

## 2023-11-20 PROCEDURE — 85027 COMPLETE CBC AUTOMATED: CPT | Performed by: STUDENT IN AN ORGANIZED HEALTH CARE EDUCATION/TRAINING PROGRAM

## 2023-11-20 PROCEDURE — 82330 ASSAY OF CALCIUM: CPT | Performed by: STUDENT IN AN ORGANIZED HEALTH CARE EDUCATION/TRAINING PROGRAM

## 2023-11-20 PROCEDURE — 2500000001 HC RX 250 WO HCPCS SELF ADMINISTERED DRUGS (ALT 637 FOR MEDICARE OP)

## 2023-11-20 PROCEDURE — 80053 COMPREHEN METABOLIC PANEL: CPT | Performed by: STUDENT IN AN ORGANIZED HEALTH CARE EDUCATION/TRAINING PROGRAM

## 2023-11-20 PROCEDURE — 83690 ASSAY OF LIPASE: CPT | Performed by: STUDENT IN AN ORGANIZED HEALTH CARE EDUCATION/TRAINING PROGRAM

## 2023-11-20 PROCEDURE — 2500000002 HC RX 250 W HCPCS SELF ADMINISTERED DRUGS (ALT 637 FOR MEDICARE OP, ALT 636 FOR OP/ED)

## 2023-11-20 RX ORDER — DIPHENHYDRAMINE HCL 25 MG
25 CAPSULE ORAL EVERY 6 HOURS PRN
Status: DISCONTINUED | OUTPATIENT
Start: 2023-11-20 | End: 2023-11-22 | Stop reason: HOSPADM

## 2023-11-20 RX ADMIN — HYDROMORPHONE HYDROCHLORIDE 1 MG: 2 TABLET ORAL at 09:31

## 2023-11-20 RX ADMIN — GABAPENTIN 300 MG: 300 CAPSULE ORAL at 09:13

## 2023-11-20 RX ADMIN — THIAMINE HYDROCHLORIDE 100 MG: 100 INJECTION, SOLUTION INTRAMUSCULAR; INTRAVENOUS at 09:14

## 2023-11-20 RX ADMIN — GABAPENTIN 300 MG: 300 CAPSULE ORAL at 01:19

## 2023-11-20 RX ADMIN — ONDANSETRON 4 MG: 2 INJECTION INTRAMUSCULAR; INTRAVENOUS at 17:03

## 2023-11-20 RX ADMIN — ACETAMINOPHEN 975 MG: 325 TABLET ORAL at 20:36

## 2023-11-20 RX ADMIN — DIPHENHYDRAMINE HYDROCHLORIDE 25 MG: 25 CAPSULE ORAL at 20:36

## 2023-11-20 RX ADMIN — SODIUM CHLORIDE, POTASSIUM CHLORIDE, SODIUM LACTATE AND CALCIUM CHLORIDE 100 ML/HR: 600; 310; 30; 20 INJECTION, SOLUTION INTRAVENOUS at 20:44

## 2023-11-20 RX ADMIN — HYDROMORPHONE HYDROCHLORIDE 1 MG: 2 TABLET ORAL at 23:55

## 2023-11-20 RX ADMIN — ACETAMINOPHEN 975 MG: 325 TABLET ORAL at 09:13

## 2023-11-20 RX ADMIN — GABAPENTIN 300 MG: 300 CAPSULE ORAL at 16:54

## 2023-11-20 RX ADMIN — FOLIC ACID 1 MG: 1 TABLET ORAL at 09:13

## 2023-11-20 RX ADMIN — PRENATAL VIT W/ FE FUMARATE-FA TAB 27-0.8 MG 1 TABLET: 27-0.8 TAB at 09:14

## 2023-11-20 RX ADMIN — Medication 5 MG: at 23:55

## 2023-11-20 RX ADMIN — HYDROMORPHONE HYDROCHLORIDE 1 MG: 2 TABLET ORAL at 05:21

## 2023-11-20 RX ADMIN — SIMETHICONE 80 MG: 80 TABLET, CHEWABLE ORAL at 09:39

## 2023-11-20 RX ADMIN — FAMOTIDINE 40 MG: 10 INJECTION INTRAVENOUS at 09:14

## 2023-11-20 RX ADMIN — HYDROMORPHONE HYDROCHLORIDE 1 MG: 2 TABLET ORAL at 16:54

## 2023-11-20 RX ADMIN — HYDROMORPHONE HYDROCHLORIDE 0.2 MG: 1 INJECTION, SOLUTION INTRAMUSCULAR; INTRAVENOUS; SUBCUTANEOUS at 17:55

## 2023-11-20 RX ADMIN — Medication 1 TABLET: at 09:13

## 2023-11-20 RX ADMIN — ACETAMINOPHEN 975 MG: 325 TABLET ORAL at 15:11

## 2023-11-20 RX ADMIN — NICOTINE 7 MG/24 HR DAILY TRANSDERMAL PATCH 1 PATCH: at 13:13

## 2023-11-20 RX ADMIN — POLYETHYLENE GLYCOL 3350 17 G: 17 POWDER, FOR SOLUTION ORAL at 09:40

## 2023-11-20 ASSESSMENT — PAIN SCALES - GENERAL
PAINLEVEL_OUTOF10: 6
PAINLEVEL_OUTOF10: 0 - NO PAIN
PAINLEVEL_OUTOF10: 9
PAINLEVEL_OUTOF10: 8
PAINLEVEL_OUTOF10: 0 - NO PAIN
PAINLEVEL_OUTOF10: 7
PAINLEVEL_OUTOF10: 8
PAINLEVEL_OUTOF10: 0 - NO PAIN
PAINLEVEL_OUTOF10: 3

## 2023-11-20 ASSESSMENT — LIFESTYLE VARIABLES
VISUAL DISTURBANCES: NOT PRESENT
ANXIETY: NO ANXIETY, AT EASE
VISUAL DISTURBANCES: NOT PRESENT
AUDITORY DISTURBANCES: NOT PRESENT
ORIENTATION AND CLOUDING OF SENSORIUM: ORIENTED AND CAN DO SERIAL ADDITIONS
ORIENTATION AND CLOUDING OF SENSORIUM: ORIENTED AND CAN DO SERIAL ADDITIONS
NAUSEA AND VOMITING: NO NAUSEA AND NO VOMITING
HEADACHE, FULLNESS IN HEAD: NOT PRESENT
TOTAL SCORE: 0
AGITATION: NORMAL ACTIVITY
HEADACHE, FULLNESS IN HEAD: NOT PRESENT
AUDITORY DISTURBANCES: NOT PRESENT
HEADACHE, FULLNESS IN HEAD: NOT PRESENT
ORIENTATION AND CLOUDING OF SENSORIUM: ORIENTED AND CAN DO SERIAL ADDITIONS
VISUAL DISTURBANCES: NOT PRESENT
TREMOR: NO TREMOR
ANXIETY: NO ANXIETY, AT EASE
TOTAL SCORE: 0
TOTAL SCORE: 3
PAROXYSMAL SWEATS: NO SWEAT VISIBLE
TREMOR: NO TREMOR
ANXIETY: NO ANXIETY, AT EASE
TOTAL SCORE: 0
TREMOR: NO TREMOR
HEADACHE, FULLNESS IN HEAD: NOT PRESENT
PAROXYSMAL SWEATS: NO SWEAT VISIBLE
AGITATION: NORMAL ACTIVITY
ANXIETY: NO ANXIETY, AT EASE
HEADACHE, FULLNESS IN HEAD: MODERATE
ANXIETY: NO ANXIETY, AT EASE
TOTAL SCORE: 0
AUDITORY DISTURBANCES: NOT PRESENT
ORIENTATION AND CLOUDING OF SENSORIUM: ORIENTED AND CAN DO SERIAL ADDITIONS
AGITATION: NORMAL ACTIVITY
NAUSEA AND VOMITING: NO NAUSEA AND NO VOMITING
NAUSEA AND VOMITING: NO NAUSEA AND NO VOMITING
VISUAL DISTURBANCES: NOT PRESENT
NAUSEA AND VOMITING: NO NAUSEA AND NO VOMITING
NAUSEA AND VOMITING: NO NAUSEA AND NO VOMITING
ORIENTATION AND CLOUDING OF SENSORIUM: ORIENTED AND CAN DO SERIAL ADDITIONS
AGITATION: NORMAL ACTIVITY
PAROXYSMAL SWEATS: NO SWEAT VISIBLE
AGITATION: NORMAL ACTIVITY
AUDITORY DISTURBANCES: NOT PRESENT
PAROXYSMAL SWEATS: NO SWEAT VISIBLE
TREMOR: NO TREMOR
AUDITORY DISTURBANCES: NOT PRESENT
PAROXYSMAL SWEATS: NO SWEAT VISIBLE
TREMOR: NO TREMOR
VISUAL DISTURBANCES: NOT PRESENT

## 2023-11-20 ASSESSMENT — PAIN DESCRIPTION - LOCATION
LOCATION: ABDOMEN
LOCATION: ABDOMEN

## 2023-11-20 ASSESSMENT — PAIN - FUNCTIONAL ASSESSMENT: PAIN_FUNCTIONAL_ASSESSMENT: 0-10

## 2023-11-20 ASSESSMENT — PAIN DESCRIPTION - DESCRIPTORS: DESCRIPTORS: ACHING

## 2023-11-20 NOTE — CARE PLAN
The patient's goals for the shift include pain management    The clinical goals for the shift include maintain pain level <6

## 2023-11-20 NOTE — PROGRESS NOTES
"ANTEPARTUM PROGRESS NOTE   11/20/2023, 7:17 AM     SUBJECTIVE: No acute events overnight. Reports pain improved overnight. Tolerated broth yesterday. Feels hungry and wants to try more food today. Denies painful contractions, VB, LOF. Reports normal fetal movement.     OBJECTIVE:   /61   Pulse 69   Temp 36.9 °C (98.4 °F) (Temporal)   Resp 16   Ht 1.651 m (5' 5\")   Wt 63 kg (138 lb 14.2 oz)   LMP 05/29/2023   SpO2 98%   BMI 23.11 kg/m²    Temp  Min: 36.9 °C (98.4 °F)  Max: 37.5 °C (99.5 °F)  Pulse  Min: 69  Max: 88  BP  Min: 116/61  Max: 137/89    General:  AAOx3, No acute distress  Cardiovascular: Warm and well perfused  Respiratory: Normal respiratory effort   Abdominal:  Soft, gravid, tender to palpation in the epigastric region, no rebound or guarding  Extremities: No edema, no calf tenderness     NST: Baseline 145, accelerations +, no decelerations, mod variability   [place]: no CTXs     Labs:   Lab Results   Component Value Date    WBC 5.5 11/19/2023    HGB 8.1 (L) 11/19/2023    HCT 25.8 (L) 11/19/2023     (L) 11/19/2023     Lab Results   Component Value Date    GLUCOSE 81 11/19/2023     (L) 11/19/2023    K 3.3 (L) 11/19/2023     11/19/2023    CO2 23 11/19/2023    ANIONGAP 10 11/19/2023    BUN 5 (L) 11/19/2023    CREATININE 0.43 (L) 11/19/2023    EGFR >90 11/19/2023    CALCIUM 8.2 (L) 11/19/2023    ALBUMIN 3.0 (L) 11/19/2023    PROT 5.3 (L) 11/19/2023    ALKPHOS 49 11/19/2023    ALT 8 11/19/2023    AST 15 11/19/2023    BILITOT 0.3 11/19/2023     Lab Results   Component Value Date    AMYLASE 204 (H) 11/17/2023    LIPASE 811 (H) 11/19/2023     Lipase   Date/Time Value Ref Range Status   11/19/2023 10:09  (H) 9 - 82 U/L Final   11/17/2023 03:06 PM 1,955 (H) 9 - 82 U/L Final   11/17/2023 10:24 AM 1,668 (H) 9 - 82 U/L Final   11/17/2023 01:24 AM 1,081 (H) 9 - 82 U/L Final     US right upper quadrant 11/17/2023    Impression  1. Enlarged and edematous appearance of the pancreas " trace  peripancreatic fluid near the pancreatic head, to be correlated with  concern for pancreatitis. No evidence of peripancreatic fluid  collections on ultrasound, however noting that the pancreatic tail  could not be visualized due to overlying bowel gas. Consider  dedicated CT or MRI protocol for further assessment if clinically  feasible and if consistent with the patient's gestational age.  2. No sonographic evidence of cholelithiasis.  3. Small volume perihepatic fluid, likely reactive to adjacent  pancreatic inflammatory process.      ASSESSMENT AND PLAN:     27 y.o.  at 25w0d admitted with acute alcoholic pancreatitis.      Acute alcoholic pancreatitis  - 1L fluid bolus -> now on mIVF @100   - Pain control with IV and PO opioids with plan to transition to PO Tylenol once patient out of acute pain   - Pepcid 40mg IV q24 hrs  - Lipase last 811 - see trend above  - low fait diet as tolerated  - mIVF @ 100/hr  - Strict I/O  - RUQ US without evidence of stones of biliary obstruction    AUD with onset after birth of her 7 year old daughter, Anxiety, Depression  - Pt notes reduction of EtOH use since pregnancy but is triggered to drink as a coping mechanism for anxiety, life stress  - CIWA protocol, last score 0  - Daily thiamine and folic acid  - Gabapentin 300mg TID for both treatment of anxiety, insomnia, and risk for mild alcohol withdrawal  - Recommend adding naltexone 50mg daily to help with alcohol use reduction once no longer requiring opioid medications  - Pt states she is already engaged in individual behavioral counseling with a counselor at [facility] and is not interested in further referrals at this time  - Interested in a  referral to help her obtain a crib and pack-n-play and assistance with finding better housing  - Naltrexone at discharge     Nicotine dependency  - Nicotine patch 7mg/d     cHTN  - Pt denies h/o cHTN, however on chart review multiple mild range BPs earlier this year prior  to pregnancy  - no PEC s/s  - HELLP labs neg with P:C 0.27     Fetal status: Reassuring, NST reactive today  - continue daily NSTs while inpatient   - for completion of anatomy  - PNL completed     Dispo: Inpatient monitoring for pain control and improvement in pancreatitis    Pt seen and discussed with [facility] Attending, Dr. Cortez.     Danii Salinas MD, PGY-3   [facility]  Pager [phone]     Principal Problem:    Acute recurrent pancreatitis  Active Problems:    Severe alcohol use disorder (CMS/HCC)    Current every day smoker

## 2023-11-20 NOTE — CARE PLAN
The patient's goals for the shift include pain management, eat solid food    The clinical goals for the shift include pain <6    Pain was managed under 6//10 when patient was not eating solid food. Patient continues to have severe pain when attempting to eat solids. Tolerating PO fluids and broth better than yesterday. No OB complaints throughout shift, patient remained free from injuries and falls as well. Patient was able to meet with social workers from [facility] today and felt like they were able to provide her with good resources for discharge. Pt resting comfortably in bed and denies needs at this time.

## 2023-11-20 NOTE — PROGRESS NOTES
Social Work Brief Note     Reason for Visit: Support     SW attempted support visit, but expectant mother was resting.  Mental health counseling resources left at bedside.  [facility] staff completed visit over the weekend.  Outpatient treatment resources were provided during that visit.  [facility] SW expected to complete a visit today.  This LSW will continue to follow, and support as needed.        Signature: Ruby Pack [facility] LSW

## 2023-11-21 ENCOUNTER — DOCUMENTATION (OUTPATIENT)
Dept: CASE MANAGEMENT | Facility: HOSPITAL | Age: 28
End: 2023-11-21
Payer: COMMERCIAL

## 2023-11-21 LAB
ALBUMIN SERPL BCP-MCNC: 2.9 G/DL (ref 3.4–5)
ALP SERPL-CCNC: 57 U/L (ref 33–110)
ALT SERPL W P-5'-P-CCNC: 12 U/L (ref 7–45)
ANION GAP SERPL CALC-SCNC: 12 MMOL/L (ref 10–20)
AST SERPL W P-5'-P-CCNC: 19 U/L (ref 9–39)
BILIRUB SERPL-MCNC: 0.2 MG/DL (ref 0–1.2)
BUN SERPL-MCNC: 5 MG/DL (ref 6–23)
CA-I BLD-SCNC: 1.13 MMOL/L (ref 1.1–1.33)
CALCIUM SERPL-MCNC: 8.1 MG/DL (ref 8.6–10.6)
CHLORIDE SERPL-SCNC: 106 MMOL/L (ref 98–107)
CO2 SERPL-SCNC: 22 MMOL/L (ref 21–32)
CREAT SERPL-MCNC: 0.5 MG/DL (ref 0.5–1.05)
ERYTHROCYTE [DISTWIDTH] IN BLOOD BY AUTOMATED COUNT: 13.3 % (ref 11.5–14.5)
GFR SERPL CREATININE-BSD FRML MDRD: >90 ML/MIN/1.73M*2
GLUCOSE SERPL-MCNC: 60 MG/DL (ref 74–99)
HCT VFR BLD AUTO: 25.4 % (ref 36–46)
HGB BLD-MCNC: 8.2 G/DL (ref 12–16)
LIPASE SERPL-CCNC: 697 U/L (ref 9–82)
MCH RBC QN AUTO: 31.2 PG (ref 26–34)
MCHC RBC AUTO-ENTMCNC: 32.3 G/DL (ref 32–36)
MCV RBC AUTO: 97 FL (ref 80–100)
NRBC BLD-RTO: 0 /100 WBCS (ref 0–0)
PLATELET # BLD AUTO: 155 X10*3/UL (ref 150–450)
POTASSIUM SERPL-SCNC: 3.9 MMOL/L (ref 3.5–5.3)
PROT SERPL-MCNC: 5 G/DL (ref 6.4–8.2)
RBC # BLD AUTO: 2.63 X10*6/UL (ref 4–5.2)
SODIUM SERPL-SCNC: 136 MMOL/L (ref 136–145)
WBC # BLD AUTO: 4.6 X10*3/UL (ref 4.4–11.3)

## 2023-11-21 PROCEDURE — 82330 ASSAY OF CALCIUM: CPT | Performed by: STUDENT IN AN ORGANIZED HEALTH CARE EDUCATION/TRAINING PROGRAM

## 2023-11-21 PROCEDURE — 36415 COLL VENOUS BLD VENIPUNCTURE: CPT | Performed by: STUDENT IN AN ORGANIZED HEALTH CARE EDUCATION/TRAINING PROGRAM

## 2023-11-21 PROCEDURE — 2500000004 HC RX 250 GENERAL PHARMACY W/ HCPCS (ALT 636 FOR OP/ED): Performed by: STUDENT IN AN ORGANIZED HEALTH CARE EDUCATION/TRAINING PROGRAM

## 2023-11-21 PROCEDURE — 80053 COMPREHEN METABOLIC PANEL: CPT | Performed by: STUDENT IN AN ORGANIZED HEALTH CARE EDUCATION/TRAINING PROGRAM

## 2023-11-21 PROCEDURE — 2500000004 HC RX 250 GENERAL PHARMACY W/ HCPCS (ALT 636 FOR OP/ED)

## 2023-11-21 PROCEDURE — 2500000001 HC RX 250 WO HCPCS SELF ADMINISTERED DRUGS (ALT 637 FOR MEDICARE OP)

## 2023-11-21 PROCEDURE — 83690 ASSAY OF LIPASE: CPT | Performed by: STUDENT IN AN ORGANIZED HEALTH CARE EDUCATION/TRAINING PROGRAM

## 2023-11-21 PROCEDURE — 59025 FETAL NON-STRESS TEST: CPT | Mod: GC

## 2023-11-21 PROCEDURE — S4991 NICOTINE PATCH NONLEGEND: HCPCS

## 2023-11-21 PROCEDURE — 2500000002 HC RX 250 W HCPCS SELF ADMINISTERED DRUGS (ALT 637 FOR MEDICARE OP, ALT 636 FOR OP/ED)

## 2023-11-21 PROCEDURE — 85027 COMPLETE CBC AUTOMATED: CPT | Performed by: STUDENT IN AN ORGANIZED HEALTH CARE EDUCATION/TRAINING PROGRAM

## 2023-11-21 PROCEDURE — 1210000001 HC SEMI-PRIVATE ROOM DAILY

## 2023-11-21 PROCEDURE — 84075 ASSAY ALKALINE PHOSPHATASE: CPT | Performed by: STUDENT IN AN ORGANIZED HEALTH CARE EDUCATION/TRAINING PROGRAM

## 2023-11-21 PROCEDURE — 2500000005 HC RX 250 GENERAL PHARMACY W/O HCPCS

## 2023-11-21 RX ORDER — OXYCODONE HYDROCHLORIDE 5 MG/1
5 TABLET ORAL EVERY 4 HOURS PRN
Status: DISCONTINUED | OUTPATIENT
Start: 2023-11-21 | End: 2023-11-22 | Stop reason: HOSPADM

## 2023-11-21 RX ADMIN — ACETAMINOPHEN 975 MG: 325 TABLET ORAL at 11:05

## 2023-11-21 RX ADMIN — BISACODYL 10 MG: 10 SUPPOSITORY RECTAL at 15:15

## 2023-11-21 RX ADMIN — GABAPENTIN 300 MG: 300 CAPSULE ORAL at 01:00

## 2023-11-21 RX ADMIN — HYDROMORPHONE HYDROCHLORIDE 0.2 MG: 1 INJECTION, SOLUTION INTRAMUSCULAR; INTRAVENOUS; SUBCUTANEOUS at 01:00

## 2023-11-21 RX ADMIN — Medication 1 EACH: at 17:01

## 2023-11-21 RX ADMIN — OXYCODONE HYDROCHLORIDE 5 MG: 5 TABLET ORAL at 14:17

## 2023-11-21 RX ADMIN — NICOTINE 7 MG/24 HR DAILY TRANSDERMAL PATCH 1 PATCH: at 08:49

## 2023-11-21 RX ADMIN — GABAPENTIN 300 MG: 300 CAPSULE ORAL at 17:01

## 2023-11-21 RX ADMIN — SODIUM CHLORIDE, POTASSIUM CHLORIDE, SODIUM LACTATE AND CALCIUM CHLORIDE 100 ML/HR: 600; 310; 30; 20 INJECTION, SOLUTION INTRAVENOUS at 17:43

## 2023-11-21 RX ADMIN — THIAMINE HYDROCHLORIDE 100 MG: 100 INJECTION, SOLUTION INTRAMUSCULAR; INTRAVENOUS at 08:50

## 2023-11-21 RX ADMIN — PRENATAL VIT W/ FE FUMARATE-FA TAB 27-0.8 MG 1 TABLET: 27-0.8 TAB at 08:49

## 2023-11-21 RX ADMIN — Medication 1 TABLET: at 08:49

## 2023-11-21 RX ADMIN — ACETAMINOPHEN 975 MG: 325 TABLET ORAL at 23:16

## 2023-11-21 RX ADMIN — Medication 5 MG: at 21:35

## 2023-11-21 RX ADMIN — FAMOTIDINE 40 MG: 10 INJECTION INTRAVENOUS at 08:49

## 2023-11-21 RX ADMIN — FOLIC ACID 1 MG: 1 TABLET ORAL at 08:49

## 2023-11-21 RX ADMIN — GABAPENTIN 300 MG: 300 CAPSULE ORAL at 08:50

## 2023-11-21 RX ADMIN — ACETAMINOPHEN 975 MG: 325 TABLET ORAL at 17:01

## 2023-11-21 RX ADMIN — DIPHENHYDRAMINE HYDROCHLORIDE 25 MG: 25 CAPSULE ORAL at 21:35

## 2023-11-21 RX ADMIN — OXYCODONE HYDROCHLORIDE 5 MG: 5 TABLET ORAL at 21:34

## 2023-11-21 RX ADMIN — SODIUM CHLORIDE, POTASSIUM CHLORIDE, SODIUM LACTATE AND CALCIUM CHLORIDE 100 ML/HR: 600; 310; 30; 20 INJECTION, SOLUTION INTRAVENOUS at 08:51

## 2023-11-21 RX ADMIN — OXYCODONE HYDROCHLORIDE 5 MG: 5 TABLET ORAL at 09:59

## 2023-11-21 RX ADMIN — ACETAMINOPHEN 975 MG: 325 TABLET ORAL at 05:10

## 2023-11-21 ASSESSMENT — LIFESTYLE VARIABLES
TOTAL SCORE: 0
HEADACHE, FULLNESS IN HEAD: NOT PRESENT
ORIENTATION AND CLOUDING OF SENSORIUM: ORIENTED AND CAN DO SERIAL ADDITIONS
NAUSEA AND VOMITING: NO NAUSEA AND NO VOMITING
NAUSEA AND VOMITING: NO NAUSEA AND NO VOMITING
PAROXYSMAL SWEATS: NO SWEAT VISIBLE
BLOOD PRESSURE: 143/83
TOTAL SCORE: 0
ORIENTATION AND CLOUDING OF SENSORIUM: ORIENTED AND CAN DO SERIAL ADDITIONS
HEADACHE, FULLNESS IN HEAD: NOT PRESENT
TREMOR: NO TREMOR
ANXIETY: NO ANXIETY, AT EASE
ANXIETY: NO ANXIETY, AT EASE
ORIENTATION AND CLOUDING OF SENSORIUM: ORIENTED AND CAN DO SERIAL ADDITIONS
AUDITORY DISTURBANCES: NOT PRESENT
ORIENTATION AND CLOUDING OF SENSORIUM: ORIENTED AND CAN DO SERIAL ADDITIONS
ANXIETY: NO ANXIETY, AT EASE
AGITATION: NORMAL ACTIVITY
PAROXYSMAL SWEATS: NO SWEAT VISIBLE
VISUAL DISTURBANCES: NOT PRESENT
TREMOR: NO TREMOR
AGITATION: NORMAL ACTIVITY
HEADACHE, FULLNESS IN HEAD: NOT PRESENT
NAUSEA AND VOMITING: NO NAUSEA AND NO VOMITING
AGITATION: NORMAL ACTIVITY
PAROXYSMAL SWEATS: NO SWEAT VISIBLE
PAROXYSMAL SWEATS: NO SWEAT VISIBLE
VISUAL DISTURBANCES: NOT PRESENT
PAROXYSMAL SWEATS: NO SWEAT VISIBLE
ANXIETY: NO ANXIETY, AT EASE
AUDITORY DISTURBANCES: NOT PRESENT
PULSE: 60
TREMOR: NO TREMOR
VISUAL DISTURBANCES: NOT PRESENT
ORIENTATION AND CLOUDING OF SENSORIUM: ORIENTED AND CAN DO SERIAL ADDITIONS
AGITATION: NORMAL ACTIVITY
ORIENTATION AND CLOUDING OF SENSORIUM: ORIENTED AND CAN DO SERIAL ADDITIONS
AUDITORY DISTURBANCES: NOT PRESENT
TOTAL SCORE: 0
TOTAL SCORE: 0
ANXIETY: NO ANXIETY, AT EASE
AUDITORY DISTURBANCES: NOT PRESENT
TREMOR: NO TREMOR
TREMOR: NO TREMOR
ANXIETY: NO ANXIETY, AT EASE
AUDITORY DISTURBANCES: NOT PRESENT
AGITATION: NORMAL ACTIVITY
VISUAL DISTURBANCES: NOT PRESENT
HEADACHE, FULLNESS IN HEAD: NOT PRESENT
TREMOR: NO TREMOR
AUDITORY DISTURBANCES: NOT PRESENT
NAUSEA AND VOMITING: NO NAUSEA AND NO VOMITING
TOTAL SCORE: 0
NAUSEA AND VOMITING: NO NAUSEA AND NO VOMITING
VISUAL DISTURBANCES: NOT PRESENT
AGITATION: NORMAL ACTIVITY
NAUSEA AND VOMITING: NO NAUSEA AND NO VOMITING
PAROXYSMAL SWEATS: NO SWEAT VISIBLE
HEADACHE, FULLNESS IN HEAD: NOT PRESENT
VISUAL DISTURBANCES: NOT PRESENT
TOTAL SCORE: 0
HEADACHE, FULLNESS IN HEAD: NOT PRESENT

## 2023-11-21 ASSESSMENT — PAIN - FUNCTIONAL ASSESSMENT
PAIN_FUNCTIONAL_ASSESSMENT: 0-10

## 2023-11-21 ASSESSMENT — PAIN SCALES - GENERAL
PAINLEVEL_OUTOF10: 4
PAINLEVEL_OUTOF10: 7
PAINLEVEL_OUTOF10: 7
PAINLEVEL_OUTOF10: 8
PAINLEVEL_OUTOF10: 7
PAINLEVEL_OUTOF10: 0 - NO PAIN
PAINLEVEL_OUTOF10: 2
PAINLEVEL_OUTOF10: 0 - NO PAIN

## 2023-11-21 ASSESSMENT — PAIN DESCRIPTION - DESCRIPTORS
DESCRIPTORS: ACHING

## 2023-11-21 ASSESSMENT — PAIN DESCRIPTION - LOCATION
LOCATION: ABDOMEN
LOCATION: ABDOMEN

## 2023-11-21 NOTE — PROGRESS NOTES
11/20/23  VERONIQUE/JOJO team met with patient on MAC 4 floor as scheduled to complete [facility] intake. SW discussed SW's role and provided additional information about the [facility] Program, provided patient with a welcome folder and discussed resources contained in the folder. SW gathered information regarding patient's family social history and to complete intake and clinical scales. There were no safety concerns or immediate needs identified but SW encouraged patient to contact service providers as soon as possible to complete AOD assessment and engage in mental health/counseling services. VERONIQUE provided available dates for [facility] clinic to get patient scheduled for 11/28/23. Patient was given a $25 visa gift card. SW addressed questions and encouraged patient to contact VERONIQUE/JOJO if she have additional questions before her appointment and as needed.

## 2023-11-21 NOTE — CARE PLAN
The patient's goals for the shift include tolerate solid food    The clinical goals for the shift include pain <6, tolerate solid food    Over the shift, patient was able to tolerate solid food better than previous three days. Tolerating PO fluids with decreased pain. Patient was kept under a 6/10 with scheduled pain medication and PRN oxycodone. VSS throughout shift, no OB complaints at this time. Patient resting comfortably in bed and declines needs.

## 2023-11-21 NOTE — PROGRESS NOTES
Social Work Brief Note     VERONIQUE met with expectant mother at bedside to follow-up on the outcome of the meeting with the [facility] VERONIQUE's.  Ms. Braga stated the meeting went really well, and she has an appointment on 11/28 with Dr. Dotson.  She has insight into how her alcohol abuse can negatively impact her baby during pregnancy, and her life in general.  She stated she plans to set boundaries with friends, and family members that also drink alcohol excessively.  Ms. Braga identified her triggers as her daughter's behavior at times, FOB, and her environment.  She also reports difficulty sleeping at night due to life stressors.  She acknowledges that decreasing/stopping alcohol use will be challenging.  But, she's very hopeful that participating in a treatment program will teach her proactive coping strategies.  SW offered words of encouragement, and support.      Resources provided this visit include utility assistance program application for HEAP, and PIPP.  No other unmet needs identified this visit.  SW will continue to follow, and assist as needed.    Signature:  Ruby MENSAH

## 2023-11-21 NOTE — PROGRESS NOTES
"ANTEPARTUM PROGRESS NOTE   2023, 7:27 AM     SUBJECTIVE: No acute events overnight. Patient has no complaints this morning.  Reports pain is improved and she has no pain at all at the moment. Tolerated eating crackers overnight when timed after dilaudid. Earlier in the evening, had intense pain with dinner (tried eating a salad). Denies painful contractions, VB, LOF. Reports normal fetal movement.   Wants to go home tomorrow.    OBJECTIVE:   /70   Pulse 61   Temp 36.4 °C (97.5 °F)   Resp 16   Ht 1.651 m (5' 5\")   Wt 63 kg (138 lb 14.2 oz)   LMP 2023   SpO2 99%   BMI 23.11 kg/m²    Temp  Min: 36.4 °C (97.5 °F)  Max: 37.6 °C (99.7 °F)  Pulse  Min: 61  Max: 88  BP  Min: 120/76  Max: 142/90    General:  AAOx3, No acute distress  Cardiovascular: Warm and well perfused  Respiratory: Normal respiratory effort   Abdominal:  Soft, gravid, somewhat tender to palpation of epigastric region with no guarding or rebound  Extremities: No edema, no calf tenderness    NST: Baseline 144, accelerations +, one variable deceleration with no other decels, mod variability   [place]: quiet     Labs:   Lab Results   Component Value Date    WBC 4.6 2023    HGB 8.2 (L) 2023    HCT 25.4 (L) 2023     2023     Lab Results   Component Value Date    GLUCOSE 60 (L) 2023     2023    K 3.9 2023     2023    CO2 22 2023    ANIONGAP 12 2023    BUN 5 (L) 2023    CREATININE 0.50 2023    EGFR >90 2023    CALCIUM 8.1 (L) 2023    ALBUMIN 2.9 (L) 2023    PROT 5.0 (L) 2023    ALKPHOS 57 2023    ALT 12 2023    AST 19 2023    BILITOT 0.2 2023     Lab Results   Component Value Date    LIPASE 697 (H) 2023        ASSESSMENT AND PLAN:     27 y.o.  at 25w0d admitted with acute alcoholic pancreatitis.      Acute alcoholic pancreatitis  - 1L fluid bolus -> now on mIVF @100   - Pain control with IV " and PO opioids - was on dilaudid 1mg q4prn PO and 0.2mg prn IV. Dc'd IV dilaudid this am due to imrpoved pain overnight. Also switched from PO dilaudid to PO oxycodone 5mg q4 prn as it may provide longer acting pain relief. Will follow up pain control today on this new regimen.  - Pepcid 40mg IV q24 hrs  - Lipase 697 this morning, continues to downtrend daily from a peak of 1955 on 11/17  - low fait diet as tolerated  - mIVF @ 100/hr  - Strict I/O  - RUQ US without evidence of stones of biliary obstruction     AUD with onset after birth of her 7 year old daughter, Anxiety, Depression  - Pt notes reduction of EtOH use since pregnancy but is triggered to drink as a coping mechanism for anxiety, life stress  - CIWA protocol, last score 0  - Daily thiamine and folic acid  - Gabapentin 300mg TID for both treatment of anxiety, insomnia, and risk for mild alcohol withdrawal  - Recommend adding naltexone 50mg daily to help with alcohol use reduction once no longer requiring opioid medications  - Pt states she is already engaged in individual behavioral counseling with a counselor at [facility] and is not interested in further referrals at this time  -  referral placed to help her obtain a crib and pack-n-play and assistance with finding better housing  - Naltrexone at discharge  - Will follow up in [facility] with Dr. Dotson, already tasked to schedule appt     Nicotine dependency  - Nicotine patch 7mg/d     cHTN  - Pt denies h/o cHTN, however on chart review multiple mild range BPs earlier this year prior to pregnancy  - no PEC s/s  - Normotensive to mild range over last 24 hrs  - HELLP labs neg with P:C 0.27     Fetal status: Reassuring, NST reactive today  - continue daily NSTs while inpatient   - for completion of anatomy  - PNL completed     Dispo: Inpatient monitoring for pain control now without IV medications and continued improvement in pancreatitis with goal for discharge to home tomorrow      Pt seen and  discussed with M Attending, Dr. Cortez.   Dianna Bermudez MD PGY2  [facility]  Pager [phone]     Principal Problem:    Acute recurrent pancreatitis  Active Problems:    Severe alcohol use disorder (CMS/HCC)    Current every day smoker

## 2023-11-22 ENCOUNTER — HOSPITAL ENCOUNTER (INPATIENT)
Facility: HOSPITAL | Age: 28
End: 2023-11-22
Attending: OBSTETRICS & GYNECOLOGY | Admitting: OBSTETRICS & GYNECOLOGY
Payer: COMMERCIAL

## 2023-11-22 VITALS
RESPIRATION RATE: 20 BRPM | SYSTOLIC BLOOD PRESSURE: 138 MMHG | TEMPERATURE: 98.2 F | OXYGEN SATURATION: 100 % | DIASTOLIC BLOOD PRESSURE: 85 MMHG | HEART RATE: 83 BPM | HEIGHT: 65 IN | WEIGHT: 138.89 LBS | BODY MASS INDEX: 23.14 KG/M2

## 2023-11-22 LAB
ALBUMIN SERPL BCP-MCNC: 3.3 G/DL (ref 3.4–5)
ALP SERPL-CCNC: 59 U/L (ref 33–110)
ALT SERPL W P-5'-P-CCNC: 12 U/L (ref 7–45)
ANION GAP SERPL CALC-SCNC: 13 MMOL/L (ref 10–20)
AST SERPL W P-5'-P-CCNC: 18 U/L (ref 9–39)
BILIRUB SERPL-MCNC: 0.2 MG/DL (ref 0–1.2)
BUN SERPL-MCNC: 4 MG/DL (ref 6–23)
CALCIUM SERPL-MCNC: 8.6 MG/DL (ref 8.6–10.6)
CHLORIDE SERPL-SCNC: 106 MMOL/L (ref 98–107)
CO2 SERPL-SCNC: 23 MMOL/L (ref 21–32)
CREAT SERPL-MCNC: 0.51 MG/DL (ref 0.5–1.05)
ERYTHROCYTE [DISTWIDTH] IN BLOOD BY AUTOMATED COUNT: 13.4 % (ref 11.5–14.5)
GFR SERPL CREATININE-BSD FRML MDRD: >90 ML/MIN/1.73M*2
GLUCOSE SERPL-MCNC: 74 MG/DL (ref 74–99)
HCT VFR BLD AUTO: 27.5 % (ref 36–46)
HGB BLD-MCNC: 8.7 G/DL (ref 12–16)
LIPASE SERPL-CCNC: 527 U/L (ref 9–82)
MCH RBC QN AUTO: 30.2 PG (ref 26–34)
MCHC RBC AUTO-ENTMCNC: 31.6 G/DL (ref 32–36)
MCV RBC AUTO: 96 FL (ref 80–100)
NRBC BLD-RTO: 0 /100 WBCS (ref 0–0)
PLATELET # BLD AUTO: 171 X10*3/UL (ref 150–450)
POTASSIUM SERPL-SCNC: 3.6 MMOL/L (ref 3.5–5.3)
PROT SERPL-MCNC: 5.7 G/DL (ref 6.4–8.2)
RBC # BLD AUTO: 2.88 X10*6/UL (ref 4–5.2)
SODIUM SERPL-SCNC: 138 MMOL/L (ref 136–145)
WBC # BLD AUTO: 4.8 X10*3/UL (ref 4.4–11.3)

## 2023-11-22 PROCEDURE — 2500000002 HC RX 250 W HCPCS SELF ADMINISTERED DRUGS (ALT 637 FOR MEDICARE OP, ALT 636 FOR OP/ED)

## 2023-11-22 PROCEDURE — 59025 FETAL NON-STRESS TEST: CPT | Mod: GC

## 2023-11-22 PROCEDURE — 83690 ASSAY OF LIPASE: CPT | Performed by: STUDENT IN AN ORGANIZED HEALTH CARE EDUCATION/TRAINING PROGRAM

## 2023-11-22 PROCEDURE — S4991 NICOTINE PATCH NONLEGEND: HCPCS

## 2023-11-22 PROCEDURE — 2500000004 HC RX 250 GENERAL PHARMACY W/ HCPCS (ALT 636 FOR OP/ED): Performed by: STUDENT IN AN ORGANIZED HEALTH CARE EDUCATION/TRAINING PROGRAM

## 2023-11-22 PROCEDURE — 36415 COLL VENOUS BLD VENIPUNCTURE: CPT | Performed by: STUDENT IN AN ORGANIZED HEALTH CARE EDUCATION/TRAINING PROGRAM

## 2023-11-22 PROCEDURE — 2500000001 HC RX 250 WO HCPCS SELF ADMINISTERED DRUGS (ALT 637 FOR MEDICARE OP)

## 2023-11-22 PROCEDURE — 84075 ASSAY ALKALINE PHOSPHATASE: CPT | Performed by: STUDENT IN AN ORGANIZED HEALTH CARE EDUCATION/TRAINING PROGRAM

## 2023-11-22 PROCEDURE — 2500000004 HC RX 250 GENERAL PHARMACY W/ HCPCS (ALT 636 FOR OP/ED)

## 2023-11-22 PROCEDURE — 85027 COMPLETE CBC AUTOMATED: CPT | Performed by: STUDENT IN AN ORGANIZED HEALTH CARE EDUCATION/TRAINING PROGRAM

## 2023-11-22 RX ORDER — POLYETHYLENE GLYCOL 3350 17 G/17G
17 POWDER, FOR SOLUTION ORAL DAILY
Qty: 30 PACKET | Refills: 0 | Status: SHIPPED | OUTPATIENT
Start: 2023-11-22 | End: 2023-11-28 | Stop reason: SDUPTHER

## 2023-11-22 RX ORDER — FOLIC ACID 1 MG/1
1 TABLET ORAL DAILY
Qty: 30 TABLET | Refills: 0 | Status: SHIPPED | OUTPATIENT
Start: 2023-11-23 | End: 2023-12-21

## 2023-11-22 RX ORDER — NICOTINE 7MG/24HR
1 PATCH, TRANSDERMAL 24 HOURS TRANSDERMAL DAILY
Qty: 30 PATCH | Refills: 0 | Status: SHIPPED | OUTPATIENT
Start: 2023-11-23 | End: 2023-11-28 | Stop reason: ALTCHOICE

## 2023-11-22 RX ORDER — ACETAMINOPHEN 500 MG
5 TABLET ORAL NIGHTLY PRN
Qty: 30 TABLET | Refills: 0 | Status: SHIPPED | OUTPATIENT
Start: 2023-11-22 | End: 2023-11-28 | Stop reason: SDUPTHER

## 2023-11-22 RX ORDER — OXYCODONE HYDROCHLORIDE 5 MG/1
5 TABLET ORAL EVERY 4 HOURS PRN
Qty: 15 TABLET | Refills: 0 | Status: SHIPPED | OUTPATIENT
Start: 2023-11-22 | End: 2023-11-27

## 2023-11-22 RX ORDER — MULTIVIT-MIN/IRON FUM/FOLIC AC 7.5 MG-4
1 TABLET ORAL DAILY
Qty: 60 TABLET | Refills: 0 | Status: SHIPPED | OUTPATIENT
Start: 2023-11-23 | End: 2024-01-22

## 2023-11-22 RX ORDER — BISACODYL 10 MG/1
10 SUPPOSITORY RECTAL DAILY PRN
Qty: 15 SUPPOSITORY | Refills: 0 | Status: SHIPPED | OUTPATIENT
Start: 2023-11-22 | End: 2023-11-28 | Stop reason: ALTCHOICE

## 2023-11-22 RX ORDER — GABAPENTIN 300 MG/1
300 CAPSULE ORAL EVERY 8 HOURS SCHEDULED
Qty: 90 CAPSULE | Refills: 0 | Status: SHIPPED | OUTPATIENT
Start: 2023-11-22 | End: 2023-11-28 | Stop reason: SDUPTHER

## 2023-11-22 RX ORDER — FAMOTIDINE 20 MG/1
20 TABLET, FILM COATED ORAL 2 TIMES DAILY
Qty: 120 TABLET | Refills: 0 | Status: SHIPPED | OUTPATIENT
Start: 2023-11-22 | End: 2024-02-08 | Stop reason: WASHOUT

## 2023-11-22 RX ADMIN — Medication 1 TABLET: at 08:19

## 2023-11-22 RX ADMIN — SODIUM CHLORIDE, POTASSIUM CHLORIDE, SODIUM LACTATE AND CALCIUM CHLORIDE 100 ML/HR: 600; 310; 30; 20 INJECTION, SOLUTION INTRAVENOUS at 03:22

## 2023-11-22 RX ADMIN — GABAPENTIN 300 MG: 300 CAPSULE ORAL at 08:19

## 2023-11-22 RX ADMIN — FOLIC ACID 1 MG: 1 TABLET ORAL at 08:19

## 2023-11-22 RX ADMIN — NICOTINE 7 MG/24 HR DAILY TRANSDERMAL PATCH 1 PATCH: at 08:20

## 2023-11-22 RX ADMIN — THIAMINE HYDROCHLORIDE 100 MG: 100 INJECTION, SOLUTION INTRAMUSCULAR; INTRAVENOUS at 08:18

## 2023-11-22 RX ADMIN — PRENATAL VIT W/ FE FUMARATE-FA TAB 27-0.8 MG 1 TABLET: 27-0.8 TAB at 08:21

## 2023-11-22 RX ADMIN — MINERAL OIL, PETROLATUM, PHENYLEPHRINE HCL: 2.5; 140; 749 OINTMENT TOPICAL at 10:17

## 2023-11-22 RX ADMIN — ACETAMINOPHEN 975 MG: 325 TABLET ORAL at 05:08

## 2023-11-22 RX ADMIN — FAMOTIDINE 40 MG: 10 INJECTION INTRAVENOUS at 08:17

## 2023-11-22 RX ADMIN — ACETAMINOPHEN 975 MG: 325 TABLET ORAL at 11:16

## 2023-11-22 RX ADMIN — GABAPENTIN 300 MG: 300 CAPSULE ORAL at 00:05

## 2023-11-22 ASSESSMENT — PAIN SCALES - GENERAL
PAINLEVEL_OUTOF10: 3
PAINLEVEL_OUTOF10: 0 - NO PAIN
PAINLEVEL_OUTOF10: 3
PAINLEVEL_OUTOF10: 0 - NO PAIN

## 2023-11-22 ASSESSMENT — LIFESTYLE VARIABLES
TOTAL SCORE: 2
TREMOR: NO TREMOR
ORIENTATION AND CLOUDING OF SENSORIUM: ORIENTED AND CAN DO SERIAL ADDITIONS
AGITATION: SOMEWHAT MORE THAN NORMAL ACTIVITY
PAROXYSMAL SWEATS: NO SWEAT VISIBLE
TOTAL SCORE: 0
HEADACHE, FULLNESS IN HEAD: NOT PRESENT
NAUSEA AND VOMITING: NO NAUSEA AND NO VOMITING
ORIENTATION AND CLOUDING OF SENSORIUM: ORIENTED AND CAN DO SERIAL ADDITIONS
ANXIETY: NO ANXIETY, AT EASE
PAROXYSMAL SWEATS: NO SWEAT VISIBLE
HEADACHE, FULLNESS IN HEAD: NOT PRESENT
ANXIETY: MILDLY ANXIOUS
VISUAL DISTURBANCES: NOT PRESENT
VISUAL DISTURBANCES: NOT PRESENT
AGITATION: NORMAL ACTIVITY
AUDITORY DISTURBANCES: NOT PRESENT
NAUSEA AND VOMITING: NO NAUSEA AND NO VOMITING
AUDITORY DISTURBANCES: NOT PRESENT
TREMOR: NO TREMOR

## 2023-11-22 ASSESSMENT — PAIN - FUNCTIONAL ASSESSMENT
PAIN_FUNCTIONAL_ASSESSMENT: 0-10

## 2023-11-22 NOTE — INDIVIDUALIZED OVERALL PLAN OF CARE NOTE
Morning update    - Pt pain is well controlled with oral oxycodone alone, slept well throughout the night and did not require doses overnight.   - Tolerated PO intake yesterday and this morning with minimal pain  - No epigastric tenderness on exam  - Will discharge home with oxycodone for pain prn, and with plans to follow up with [facility] early next week. Can address naltrexone use in clinic at that time.    Pt seen and d/w Dr. Dana Bermudez MD PGY-2  MFM

## 2023-11-22 NOTE — CARE PLAN
Problem: Pain  Goal: Takes deep breaths with improved pain control throughout the shift  Outcome: Met  Goal: Turns in bed with improved pain control throughout the shift  Outcome: Met  Goal: Walks with improved pain control throughout the shift  Outcome: Met  Goal: Performs ADL's with improved pain control throughout shift  Outcome: Met  Goal: Free from opioid side effects throughout the shift  Outcome: Met  Goal: Free from acute confusion related to pain meds throughout the shift  Outcome: Met     Problem: Antepartum  Goal: Maintain pregnancy as long as maternal and/or fetal condition is stable  Outcome: Met  Goal: Avoid/minimize constipation  Outcome: Met  Goal: No decrease in circulation/VTE  Outcome: Met  Goal: FHR remains reassuring  Outcome: Met  Goal: Minimize anxiety/maximize coping  Outcome: Met     Problem: Withdrawal  Goal: Demonstrates improving s/sx of withdrawal  Outcome: Met     Problem: Pain - Adult  Goal: Verbalizes/displays adequate comfort level or baseline comfort level  Outcome: Met     Problem: Safety - Adult  Goal: Free from fall injury  Outcome: Met     Problem: Discharge Planning  Goal: Discharge to home or other facility with appropriate resources  Outcome: Met     Problem: Chronic Conditions and Co-morbidities  Goal: Patient's chronic conditions and co-morbidity symptoms are monitored and maintained or improved  Outcome: Met   The patient's goals for the shift include go home today.    The clinical goals for the shift include pain will be a 4 or less today.    VSS and FHR is WNL.Pt. is eating a regular diet today and denies pain.She has good FM.labs were improving yesterday. Stable and wants to go home today.

## 2023-11-22 NOTE — DISCHARGE SUMMARY
Discharge Summary    Admission Date: 2023  Discharge Date: 2023    Discharge Diagnosis  Acute recurrent pancreatitis    Hospital Course  Delivery Date: This patient has no babies on file. This patient has no babies on file.  Delivery type: This patient has no babies on file.   GA at delivery: 25w2d  Outcome: This patient has no babies on file.  Anesthesia during delivery: This patient has no babies on file.  Intrapartum complications: This patient has no babies on file.  Feeding method:       Procedures: none  Contraception at discharge: none    Arlen Braga is a 26 yo  at 25.2 wga who presented for abdominal pain, nausea, vomiting, and diarrhea. She has a PMHx notable for acute alcoholic pancreatitis in 3/2023, tobacco use (6 cigs per day), and alcohol use disorder with one alcoholic beverage daily throughout this pregnancy. She was diagnosed with acute alcoholic pancreatitis with elevated amylase and lipase to 119 and 568, respectively. Her labs were also notable for leukocytosis to 11.8. She received thiamine, IV fluids, Pepcid, and was advanced from NPO to a low fat diet, as tolerated. Her RUQ US did not show evidence of stones or biliary obstruction. She also received IV pain medication, gabapentin for alcohol withdrawal and was placed on CIWA protocol. She received daily fetal monitoring, thiamine, folic acid, gabapentin, nicotine patches, and her electrolytes were repleted as necessary. Her lipase trended to a peak of 1955 on hospital day 3 and began downtrending to 811 by hospital day 4. By Hospital day 6, her pain had improved and was controlled with PO pain meds, she was tolerating PO diet, her lipase was 697, and her leukocytosis had resolved. She received EtOH cessation counseling, a  consult for housing assistance and to help her obtain a crib and pack-n-play. She stated she was already engaged in individual counseling with [facility] and was not interested in further referrals. She  was discharged on naltrexone with outpatient follow up in [facility] with Dr. Dotson, as below.    Of note: pt had mild range BP on arrival to triage. She had no h/o HDP and denied HA, scotoma, RUQ pain, chest pain, or SOB. She had additional mild range blood pressures during her hospital course. On chart review, she had multiple mild range Bps this year, prior to pregnancy, consistent with chronic hypertension. She remained normotensive to mild range BPs, without PEC symptoms and had negative HELLP labs with a P:C of 0.27.       Pertinent Physical Exam At Time of Discharge  General:  AAOx3, No acute distress  Cardiovascular: Warm and well perfused  Respiratory: Normal respiratory effort   Abdominal:  Soft, gravid, somewhat tender to palpation of epigastric region with no guarding or rebound  Extremities: No edema, no calf tenderness     NST: Baseline 149, accelerations +, decelerations -, mod variability   [place]: quiet    Discharge Meds     Your medication list        START taking these medications        Instructions Last Dose Given Next Dose Due   bisacodyl 10 mg suppository  Commonly known as: Dulcolax (bisacodyl)      Insert 1 suppository (10 mg) into the rectum once daily as needed for constipation.       famotidine 20 mg tablet  Commonly known as: Pepcid      Take 1 tablet (20 mg) by mouth 2 times a day.       folic acid 1 mg tablet  Commonly known as: Folvite  Start taking on: November 23, 2023      Take 1 tablet (1 mg) by mouth once daily. Do not start before November 23, 2023.       gabapentin 300 mg capsule  Commonly known as: Neurontin      Take 1 capsule (300 mg) by mouth every 8 hours.       melatonin 5 mg tablet      Take 1 tablet (5 mg) by mouth as needed at bedtime for sleep.       multivitamin with minerals tablet  Start taking on: November 23, 2023      Take 1 tablet by mouth once daily. Do not start before November 23, 2023.       nicotine 7 mg/24 hr patch  Commonly known as: Nicoderm CQ  Start  taking on: November 23, 2023      Place 1 patch over 24 hours on the skin once daily. Do not start before November 23, 2023.       oxyCODONE 5 mg immediate release tablet  Commonly known as: Roxicodone      Take 1 tablet (5 mg) by mouth every 4 hours if needed for severe pain (7 - 10) for up to 5 days.       phenyleph-min oil-petrolatum 0.25-14-74.9 % rectal ointment  Commonly known as: Preparation H      Insert into the rectum 4 times a day as needed for hemorrhoids.       polyethylene glycol 17 gram packet  Commonly known as: Glycolax, Miralax      Take 17 g by mouth once daily.       prenatal vitamin (iron-folic) 27 mg iron-800 mcg folic acid tablet  Start taking on: November 23, 2023      Take 1 tablet by mouth once daily. Do not start before November 23, 2023.                 Where to Get Your Medications        These medications were sent to [facility]/pharmacy #[id number] - [place], [place] - [address] [facility] [address]      Phone: [phone]   bisacodyl 10 mg suppository  famotidine 20 mg tablet  folic acid 1 mg tablet  gabapentin 300 mg capsule  melatonin 5 mg tablet  multivitamin with minerals tablet  nicotine 7 mg/24 hr patch  oxyCODONE 5 mg immediate release tablet  phenyleph-min oil-petrolatum 0.25-14-74.9 % rectal ointment  polyethylene glycol 17 gram packet  prenatal vitamin (iron-folic) 27 mg iron-800 mcg folic acid tablet          Complications Requiring Follow-Up  N/A    Test Results Pending At Discharge  Pending Labs       No current pending labs.            Outpatient Follow-Up  Future Appointments   Date Time Provider Department Center   11/28/2023 10:30 AM Kathryn Dotson MD [id number]           Anusha Page MD PGY1

## 2023-11-24 NOTE — SIGNIFICANT EVENT
"Follow-up Phone Call Note:   Interview:  Care Type: Women's Health   Phone Number Call  [phone]   Call Outcome: Connected with patient   Patient Reports Feeling (symptoms) Are:  \" not good, still in pain\"   Patient Has a Primary Care Provider:     Yes   Post-hospitalization Follow-up Occurred According To Schedule:    No   Reason: appointment  scheduled in the future    Delivered Baby(ies): no  Comments:  Patient states the pharmacy was unable to fill her prescriptions, she has not called this morning yet. Patient states she is still in a lot of pain. Encouraged her to call her doctor's office if she could still not get her prescriptions, and to let them know about her pain.  Patient states she has not been taking her blood pressures as instructed, encouraged her to take her BP twice a day and instructed her on when to call the doctor.    Date/Time Of Call: 11/24/23 @ 1103   Call Back Done By: care coordinator   Callback Complete:  Yes                                          "

## 2023-11-27 ENCOUNTER — TELEPHONE (OUTPATIENT)
Dept: PEDIATRICS | Facility: CLINIC | Age: 28
End: 2023-11-27
Payer: COMMERCIAL

## 2023-11-27 NOTE — TELEPHONE ENCOUNTER
JOJO PIPER called to remind pt of her appointment the following day.  Pt stated that she will be present.

## 2023-11-28 ENCOUNTER — TELEMEDICINE (OUTPATIENT)
Dept: OBSTETRICS AND GYNECOLOGY | Facility: CLINIC | Age: 28
End: 2023-11-28
Payer: COMMERCIAL

## 2023-11-28 DIAGNOSIS — F41.9 ANXIETY AND DEPRESSION: ICD-10-CM

## 2023-11-28 DIAGNOSIS — F10.20 SEVERE ALCOHOL USE DISORDER (MULTI): ICD-10-CM

## 2023-11-28 DIAGNOSIS — Z71.6 ENCOUNTER FOR SMOKING CESSATION COUNSELING: ICD-10-CM

## 2023-11-28 DIAGNOSIS — K85.90 ACUTE RECURRENT PANCREATITIS (HHS-HCC): ICD-10-CM

## 2023-11-28 DIAGNOSIS — K85.20 ALCOHOL-INDUCED ACUTE PANCREATITIS, UNSPECIFIED COMPLICATION STATUS (HHS-HCC): ICD-10-CM

## 2023-11-28 DIAGNOSIS — O09.92 HIGH-RISK PREGNANCY IN SECOND TRIMESTER (HHS-HCC): ICD-10-CM

## 2023-11-28 DIAGNOSIS — J45.909 ASTHMA, UNSPECIFIED ASTHMA SEVERITY, UNSPECIFIED WHETHER COMPLICATED, UNSPECIFIED WHETHER PERSISTENT (HHS-HCC): ICD-10-CM

## 2023-11-28 DIAGNOSIS — O99.332 TOBACCO SMOKING AFFECTING PREGNANCY IN SECOND TRIMESTER (HHS-HCC): ICD-10-CM

## 2023-11-28 DIAGNOSIS — O09.92 SUPERVISION OF HIGH RISK PREGNANCY IN SECOND TRIMESTER (HHS-HCC): Primary | ICD-10-CM

## 2023-11-28 DIAGNOSIS — F32.A ANXIETY AND DEPRESSION: ICD-10-CM

## 2023-11-28 DIAGNOSIS — I10 CHRONIC HYPERTENSION: ICD-10-CM

## 2023-11-28 PROBLEM — Z87.19 HISTORY OF PANCREATITIS: Status: RESOLVED | Noted: 2023-11-16 | Resolved: 2023-11-28

## 2023-11-28 PROBLEM — O09.30: Status: RESOLVED | Noted: 2023-11-16 | Resolved: 2023-11-28

## 2023-11-28 PROCEDURE — 99215 OFFICE O/P EST HI 40 MIN: CPT | Performed by: OBSTETRICS & GYNECOLOGY

## 2023-11-28 PROCEDURE — 99215 OFFICE O/P EST HI 40 MIN: CPT | Mod: TH,GT,95 | Performed by: OBSTETRICS & GYNECOLOGY

## 2023-11-28 RX ORDER — BUPROPION HYDROCHLORIDE 150 MG/1
150 TABLET ORAL DAILY
Qty: 30 TABLET | Refills: 11 | Status: SHIPPED | OUTPATIENT
Start: 2023-11-28 | End: 2024-02-22 | Stop reason: HOSPADM

## 2023-11-28 RX ORDER — GABAPENTIN 300 MG/1
300 CAPSULE ORAL
Qty: 30 CAPSULE | Refills: 11 | Status: SHIPPED | OUTPATIENT
Start: 2023-11-28 | End: 2023-11-28 | Stop reason: WASHOUT

## 2023-11-28 RX ORDER — ACETAMINOPHEN 500 MG
5 TABLET ORAL NIGHTLY PRN
Qty: 30 TABLET | Refills: 0 | Status: SHIPPED | OUTPATIENT
Start: 2023-11-28 | End: 2023-12-28

## 2023-11-28 RX ORDER — POLYETHYLENE GLYCOL 3350 17 G/17G
17 POWDER, FOR SOLUTION ORAL DAILY
Qty: 30 PACKET | Refills: 0 | Status: SHIPPED | OUTPATIENT
Start: 2023-11-28 | End: 2023-12-28

## 2023-11-28 RX ORDER — GABAPENTIN 300 MG/1
CAPSULE ORAL
Qty: 150 CAPSULE | Refills: 1 | Status: SHIPPED | OUTPATIENT
Start: 2023-11-28 | End: 2024-02-22 | Stop reason: HOSPADM

## 2023-11-28 RX ORDER — GABAPENTIN 300 MG/1
600 CAPSULE ORAL NIGHTLY
Qty: 60 CAPSULE | Refills: 0 | Status: SHIPPED | OUTPATIENT
Start: 2023-11-28 | End: 2023-11-28 | Stop reason: WASHOUT

## 2023-11-28 NOTE — PROGRESS NOTES
"Virtual apptmt today due to adverse weather conditions.    Suspected Alcoholic Pancreatitis:  - Reports pain markedly improved since discharge from [facility] service (11/17-11/22). Does admit to eating foods that cause flares (chips, tabasco) but careful enough not to get into severe pain.  - Reports sobriety from alcohol since admission with decreased urges. \"Staying out of the hospital is motivation enough\"  - Repeat labwork at next in person visit    2. Tobacco use: Has cut back to smoking 3 cigarettes per day with plan to continue cutting back. Interested in Wellbutrin to help with cessation.    3. CHTN  - Not on meds. Reports BP have been normal-mild range at home (highest diastolic 93, systolic low 140s). Denies symptoms.   - Growth at 28 weeks.  - Entered care too late for bASA    4. Depression and anxiety  - On Gabapentin 300mg QID --> incr night time dose to 600mg for total of 1500mg daily  - Cont melatonin 5mg at bedtime for sleep  - Pt state therapist through [employer] often cancels. Interested in referral to in person therapy at a location without parking fees. Will reach out to  [facility] and [facility]. Pt was also referred to [facility] previously by the [facility] coordinators.    Follow up in 2 weeks for a routine prenatal visit and growth ultrasound.    Seen and discussed with Dr. Mauri Park MD  Obtetrics and Gynecology    I saw and evaluated the patient. I personally obtained the key and critical portions of the history and physical exam or was physically present for key and critical portions performed by the resident/fellow. I reviewed the resident/fellow's documentation and discussed the patient with the resident/fellow. I agree with the resident/fellow's medical decision making as documented in the note.    Kathryn Dotson MD    "

## 2023-11-30 ENCOUNTER — TELEPHONE (OUTPATIENT)
Dept: OBSTETRICS AND GYNECOLOGY | Facility: CLINIC | Age: 28
End: 2023-11-30
Payer: COMMERCIAL

## 2023-11-30 NOTE — TELEPHONE ENCOUNTER
----- Message from Romero Park MD sent at 11/28/2023 11:02 AM EST -----  De Don,    Can we schedule Arlen Shultz for a return visit and growth ultrasound in 2 weeks? She'll need a 1-hr and 2nd trimester labs at that appt.    All the orders are in. I also put OB follow-up order in from the OB wrap-up tab so apologies if this is redundant.    Thanks!  [place]    12/12/23 1300 ultrasound, 1430 [facility] appointment   Left message to call RN

## 2023-12-11 ENCOUNTER — TELEPHONE (OUTPATIENT)
Dept: PEDIATRICS | Facility: CLINIC | Age: 28
End: 2023-12-11
Payer: COMMERCIAL

## 2023-12-11 NOTE — TELEPHONE ENCOUNTER
JOJO PIPER called pt to remind her of her appointment tomorrow and if she needed transportation.  Pt stated that she will be present and that she didn't need transportation.

## 2023-12-12 ENCOUNTER — APPOINTMENT (OUTPATIENT)
Dept: RADIOLOGY | Facility: CLINIC | Age: 28
End: 2023-12-12
Payer: COMMERCIAL

## 2024-01-10 ENCOUNTER — APPOINTMENT (OUTPATIENT)
Dept: RADIOLOGY | Facility: CLINIC | Age: 29
End: 2024-01-10
Payer: COMMERCIAL

## 2024-01-23 ENCOUNTER — APPOINTMENT (OUTPATIENT)
Dept: RADIOLOGY | Facility: HOSPITAL | Age: 29
End: 2024-01-23
Payer: COMMERCIAL

## 2024-01-23 ENCOUNTER — HOSPITAL ENCOUNTER (OUTPATIENT)
Facility: HOSPITAL | Age: 29
Discharge: HOME | End: 2024-01-23
Attending: OBSTETRICS & GYNECOLOGY | Admitting: OBSTETRICS & GYNECOLOGY
Payer: COMMERCIAL

## 2024-01-23 ENCOUNTER — HOSPITAL ENCOUNTER (EMERGENCY)
Facility: HOSPITAL | Age: 29
Discharge: HOME | End: 2024-01-23
Payer: COMMERCIAL

## 2024-01-23 VITALS
SYSTOLIC BLOOD PRESSURE: 132 MMHG | TEMPERATURE: 97.8 F | HEIGHT: 65 IN | DIASTOLIC BLOOD PRESSURE: 86 MMHG | BODY MASS INDEX: 24.16 KG/M2 | RESPIRATION RATE: 17 BRPM | OXYGEN SATURATION: 100 % | HEART RATE: 84 BPM | WEIGHT: 145 LBS

## 2024-01-23 VITALS
HEART RATE: 80 BPM | TEMPERATURE: 97.5 F | BODY MASS INDEX: 24.75 KG/M2 | RESPIRATION RATE: 16 BRPM | HEIGHT: 64 IN | OXYGEN SATURATION: 100 % | SYSTOLIC BLOOD PRESSURE: 113 MMHG | DIASTOLIC BLOOD PRESSURE: 66 MMHG | WEIGHT: 145 LBS

## 2024-01-23 LAB
ERYTHROCYTE [DISTWIDTH] IN BLOOD BY AUTOMATED COUNT: 12.3 % (ref 11.5–14.5)
GLUCOSE BLD MANUAL STRIP-MCNC: 68 MG/DL (ref 74–99)
HCT VFR BLD AUTO: 31 % (ref 36–46)
HGB BLD-MCNC: 9.9 G/DL (ref 12–16)
MCH RBC QN AUTO: 29.9 PG (ref 26–34)
MCHC RBC AUTO-ENTMCNC: 31.9 G/DL (ref 32–36)
MCV RBC AUTO: 94 FL (ref 80–100)
NRBC BLD-RTO: 0 /100 WBCS (ref 0–0)
PLATELET # BLD AUTO: 181 X10*3/UL (ref 150–450)
RBC # BLD AUTO: 3.31 X10*6/UL (ref 4–5.2)
WBC # BLD AUTO: 6.7 X10*3/UL (ref 4.4–11.3)

## 2024-01-23 PROCEDURE — 70450 CT HEAD/BRAIN W/O DYE: CPT

## 2024-01-23 PROCEDURE — 2500000001 HC RX 250 WO HCPCS SELF ADMINISTERED DRUGS (ALT 637 FOR MEDICARE OP)

## 2024-01-23 PROCEDURE — 71046 X-RAY EXAM CHEST 2 VIEWS: CPT | Mod: FR

## 2024-01-23 PROCEDURE — 72125 CT NECK SPINE W/O DYE: CPT

## 2024-01-23 PROCEDURE — 99214 OFFICE O/P EST MOD 30 MIN: CPT

## 2024-01-23 PROCEDURE — 59025 FETAL NON-STRESS TEST: CPT

## 2024-01-23 PROCEDURE — 85027 COMPLETE CBC AUTOMATED: CPT

## 2024-01-23 PROCEDURE — 99281 EMR DPT VST MAYX REQ PHY/QHP: CPT

## 2024-01-23 PROCEDURE — 99221 1ST HOSP IP/OBS SF/LOW 40: CPT

## 2024-01-23 PROCEDURE — 36415 COLL VENOUS BLD VENIPUNCTURE: CPT

## 2024-01-23 PROCEDURE — 71046 X-RAY EXAM CHEST 2 VIEWS: CPT | Mod: FOREIGN READ | Performed by: RADIOLOGY

## 2024-01-23 PROCEDURE — 72125 CT NECK SPINE W/O DYE: CPT | Performed by: RADIOLOGY

## 2024-01-23 PROCEDURE — 70450 CT HEAD/BRAIN W/O DYE: CPT | Performed by: RADIOLOGY

## 2024-01-23 PROCEDURE — 4500999001 HC ED NO CHARGE

## 2024-01-23 PROCEDURE — 82947 ASSAY GLUCOSE BLOOD QUANT: CPT

## 2024-01-23 PROCEDURE — 2500000005 HC RX 250 GENERAL PHARMACY W/O HCPCS

## 2024-01-23 PROCEDURE — 99215 OFFICE O/P EST HI 40 MIN: CPT | Mod: 25

## 2024-01-23 RX ORDER — GABAPENTIN 300 MG/1
300 CAPSULE ORAL ONCE
Status: COMPLETED | OUTPATIENT
Start: 2024-01-23 | End: 2024-01-23

## 2024-01-23 RX ORDER — ACETAMINOPHEN 325 MG/1
975 TABLET ORAL ONCE
Status: COMPLETED | OUTPATIENT
Start: 2024-01-23 | End: 2024-01-23

## 2024-01-23 RX ORDER — CYCLOBENZAPRINE HCL 10 MG
10 TABLET ORAL ONCE
Status: COMPLETED | OUTPATIENT
Start: 2024-01-23 | End: 2024-01-23

## 2024-01-23 RX ADMIN — CYCLOBENZAPRINE 10 MG: 10 TABLET, FILM COATED ORAL at 18:22

## 2024-01-23 RX ADMIN — ACETAMINOPHEN 975 MG: 325 TABLET ORAL at 15:31

## 2024-01-23 RX ADMIN — GABAPENTIN 300 MG: 300 CAPSULE ORAL at 21:40

## 2024-01-23 RX ADMIN — DIPHENHYDRAMINE HYDROCHLORIDE AND LIDOCAINE HYDROCHLORIDE AND ALUMINUM HYDROXIDE AND MAGNESIUM HYDRO 10 ML: KIT at 21:40

## 2024-01-23 SDOH — ECONOMIC STABILITY: HOUSING INSECURITY: DO YOU FEEL UNSAFE GOING BACK TO THE PLACE WHERE YOU ARE LIVING?: NO

## 2024-01-23 SDOH — SOCIAL STABILITY: SOCIAL INSECURITY: HAVE YOU HAD THOUGHTS OF HARMING ANYONE ELSE?: NO

## 2024-01-23 SDOH — HEALTH STABILITY: MENTAL HEALTH: SUICIDAL BEHAVIOR (LIFETIME): NO

## 2024-01-23 SDOH — HEALTH STABILITY: MENTAL HEALTH: WISH TO BE DEAD (PAST 1 MONTH): NO

## 2024-01-23 SDOH — SOCIAL STABILITY: SOCIAL INSECURITY: ARE THERE ANY APPARENT SIGNS OF INJURIES/BEHAVIORS THAT COULD BE RELATED TO ABUSE/NEGLECT?: NO

## 2024-01-23 SDOH — SOCIAL STABILITY: SOCIAL INSECURITY: DOES ANYONE TRY TO KEEP YOU FROM HAVING/CONTACTING OTHER FRIENDS OR DOING THINGS OUTSIDE YOUR HOME?: NO

## 2024-01-23 SDOH — HEALTH STABILITY: MENTAL HEALTH: WERE YOU ABLE TO COMPLETE ALL THE BEHAVIORAL HEALTH SCREENINGS?: YES

## 2024-01-23 SDOH — HEALTH STABILITY: MENTAL HEALTH: HAVE YOU USED ANY PRESCRIPTION DRUGS OTHER THAN PRESCRIBED IN THE PAST 12 MONTHS?: NO

## 2024-01-23 SDOH — SOCIAL STABILITY: SOCIAL INSECURITY: HAS ANYONE EVER THREATENED TO HURT YOUR FAMILY OR YOUR PETS?: NO

## 2024-01-23 SDOH — SOCIAL STABILITY: SOCIAL INSECURITY: DO YOU FEEL ANYONE HAS EXPLOITED OR TAKEN ADVANTAGE OF YOU FINANCIALLY OR OF YOUR PERSONAL PROPERTY?: NO

## 2024-01-23 SDOH — SOCIAL STABILITY: SOCIAL INSECURITY: ABUSE SCREEN: ADULT

## 2024-01-23 SDOH — HEALTH STABILITY: MENTAL HEALTH: HAVE YOU USED ANY SUBSTANCES (CANABIS, COCAINE, HEROIN, HALLUCINOGENS, INHALANTS, ETC.) IN THE PAST 12 MONTHS?: YES

## 2024-01-23 SDOH — SOCIAL STABILITY: SOCIAL INSECURITY: VERBAL ABUSE: DENIES

## 2024-01-23 SDOH — SOCIAL STABILITY: SOCIAL INSECURITY: ARE YOU OR HAVE YOU BEEN THREATENED OR ABUSED PHYSICALLY, EMOTIONALLY, OR SEXUALLY BY ANYONE?: NO

## 2024-01-23 SDOH — HEALTH STABILITY: MENTAL HEALTH: NON-SPECIFIC ACTIVE SUICIDAL THOUGHTS (PAST 1 MONTH): NO

## 2024-01-23 SDOH — SOCIAL STABILITY: SOCIAL INSECURITY: PHYSICAL ABUSE: DENIES

## 2024-01-23 ASSESSMENT — ENCOUNTER SYMPTOMS
HEADACHES: 1
ABDOMINAL DISTENTION: 0
WEAKNESS: 0
WHEEZING: 0
CONFUSION: 0
SORE THROAT: 0
APPETITE CHANGE: 0
VOMITING: 1
NECK PAIN: 1
AGITATION: 0
FLANK PAIN: 1
NAUSEA: 1
ABDOMINAL PAIN: 0
ACTIVITY CHANGE: 0
SEIZURES: 0
NUMBNESS: 0
SHORTNESS OF BREATH: 0
BACK PAIN: 1

## 2024-01-23 ASSESSMENT — COLUMBIA-SUICIDE SEVERITY RATING SCALE - C-SSRS
2. HAVE YOU ACTUALLY HAD ANY THOUGHTS OF KILLING YOURSELF?: NO
1. IN THE PAST MONTH, HAVE YOU WISHED YOU WERE DEAD OR WISHED YOU COULD GO TO SLEEP AND NOT WAKE UP?: NO
6. HAVE YOU EVER DONE ANYTHING, STARTED TO DO ANYTHING, OR PREPARED TO DO ANYTHING TO END YOUR LIFE?: NO
2. HAVE YOU ACTUALLY HAD ANY THOUGHTS OF KILLING YOURSELF?: NO
1. IN THE PAST MONTH, HAVE YOU WISHED YOU WERE DEAD OR WISHED YOU COULD GO TO SLEEP AND NOT WAKE UP?: NO
6. HAVE YOU EVER DONE ANYTHING, STARTED TO DO ANYTHING, OR PREPARED TO DO ANYTHING TO END YOUR LIFE?: NO

## 2024-01-23 ASSESSMENT — LIFESTYLE VARIABLES
AUDIT-C TOTAL SCORE: 0
HOW MANY STANDARD DRINKS CONTAINING ALCOHOL DO YOU HAVE ON A TYPICAL DAY: PATIENT DOES NOT DRINK
HOW OFTEN DO YOU HAVE 6 OR MORE DRINKS ON ONE OCCASION: NEVER
SKIP TO QUESTIONS 9-10: 1
AUDIT-C TOTAL SCORE: 0
HOW OFTEN DO YOU HAVE A DRINK CONTAINING ALCOHOL: NEVER

## 2024-01-23 ASSESSMENT — PAIN SCALES - GENERAL
PAINLEVEL: 8
PAINLEVEL_OUTOF10: 8
PAINLEVEL_OUTOF10: 6
PAINLEVEL_OUTOF10: 8

## 2024-01-23 ASSESSMENT — PATIENT HEALTH QUESTIONNAIRE - PHQ9
SUM OF ALL RESPONSES TO PHQ9 QUESTIONS 1 & 2: 0
2. FEELING DOWN, DEPRESSED OR HOPELESS: NOT AT ALL
1. LITTLE INTEREST OR PLEASURE IN DOING THINGS: NOT AT ALL

## 2024-01-23 ASSESSMENT — PAIN DESCRIPTION - LOCATION
LOCATION: HEAD
LOCATION: HEAD

## 2024-01-23 ASSESSMENT — PAIN - FUNCTIONAL ASSESSMENT
PAIN_FUNCTIONAL_ASSESSMENT: 0-10
PAIN_FUNCTIONAL_ASSESSMENT: 0-10

## 2024-01-23 ASSESSMENT — PAIN DESCRIPTION - PAIN TYPE: TYPE: ACUTE PAIN

## 2024-01-23 NOTE — ED TRIAGE NOTES
3 miscarriage presents for head and neck pain after she slipped and fell on ice. Patient denies loss of consciousness.

## 2024-01-23 NOTE — H&P
Obstetrical Triage History and Physical     Arlen Braga is a 28 y.o. . 34w1d seen in triage.    Chief Complaint: Fall    Assessment/Plan    R/o PTL   - cramping spacing since in triage  - SVE: closed   - tylenol given with improvement in cramping   - Low suspicion for PTL at this time  - Return precautions reviewed   - Recommend tylenol, heat pad for discomfort     S/p fall, head trauma  - Trauma consult for head and neck trauma, recommend CXR, CT head wo contrast, CT cervical spine wo contrast  - Placed in c-collar by trauma team  - CXR, CT head wo contrast, CT cervical spine wo contrast unremarkable  - Initial CBC with Hgb 9.9, increased from 8.7 ()  - Cleared by trauma team for dc home  - Home with tylenol for pain    IUP at 34w1d  - NST reactive  - Good fetal movement  - Continue routine prenatal care    Maternal Well-being  - Vital signs stable and WNL  - All questions and concerns addressed    Dispo:  - appropriate for discharge to home, patient comfortable with this   - return precautions reviewed     Discussed with Dr. Lehman, tracing reviewed, agrees with plan to d/c home.   Maricel Greenfield PA-C        Active Problems:  There are no active Hospital Problems.      Pregnancy Problems (from 10/17/23 to present)       Problem Noted Resolved    High-risk pregnancy in second trimester 2023 by Romero Park MD No    Priority:  Medium      Chronic hypertension 2023 by Romero Park MD No    Priority:  Medium      Overview Signed 2023 11:29 AM by Romero Park MD     BPs remain normal-mild range off meds  Denies symptoms  Follow-up growth @ 28 wga         Severe alcohol use disorder (CMS/HCC) 2023 by JAME Levy-CNP No    Priority:  Medium      Overview Addendum 2023  9:44 PM by Kathryn Dotson MD     Reduced use from 1 pint/day pre-pregnancy to one to two 12 oz cans of MXD per day   Reports sobriety since admission to [facility] (-) with decreased  urges after starting gabapentin.  : Increase to gabapentin 300/300/300/600  Referred to [facility] by [facility] coordinators for intake and counseling         Tobacco smoking affecting pregnancy in second trimester 2023 by KIRILL Levy No    Priority:  Medium      Overview Addendum 2023  9:45 PM by Kathryn Dotson MD     Cut back to 3 cigarettes per day since [facility] admission (-)  : Start Wellbutrin 150 mg daily  [ ] consider referral to [facility]         Asthma 3/22/2011 by KIRILL Levy No    Priority:  Medium      Overview Signed 2023  5:03 PM by KIRILL Levy     Never been hospitalized. Using rescue inhaler once Q night         History of pancreatitis 2023 by KIRILL Levy 2023 by Romero Park MD    Overview Addendum 2023  5:16 PM by KIRILL Levy     Hospitalized in 3/2023         Limited prenatal care, antepartum 2023 by KIRILL Levy 2023 by Romero Park MD          Enma Mckinley is here complaining of headache and contractions following fall. Good fetal movement. Denies vaginal bleeding., Denies leaking of fluid.      Patient states she fell at 7am onto her tailbone, shoulders, and head. She endorses blurry vision and x1 episode of emesis following hitting her head. She denies LOC, seizure. She states continued headache, neck pain, rib pain, and cramping in lower abdomen.   She notes cramping has spaced out since being in triage.      Obstetrical History   OB History    Para Term  AB Living   5 1 1   3 1   SAB IAB Ectopic Multiple Live Births   3       1      # Outcome Date GA Lbr Olegario/2nd Weight Sex Delivery Anes PTL Lv   5 Current            4 2018           3 Term 2016   2.948 kg F Vag-Spont   OSITO   2 2014           1 2012               Past Medical History  Past Medical History:   Diagnosis Date    Anemia     Depression         Past  Surgical History   Past Surgical History:   Procedure Laterality Date    WISDOM TOOTH EXTRACTION Bilateral     pt unsure when       Social History  Social History     Tobacco Use    Smoking status: Every Day     Types: Cigarettes    Smokeless tobacco: Never    Tobacco comments:     Pt states she is currently in a program to quit. Does not want smoking cessation information at this time   Substance Use Topics    Alcohol use: Yes     Alcohol/week: 4.0 standard drinks of alcohol     Types: 4 Standard drinks or equivalent per week     Comment: adair sun     Substance and Sexual Activity   Drug Use Yes    Types: Marijuana       Allergies  Patient has no known allergies.     Medications  Medications Prior to Admission   Medication Sig Dispense Refill Last Dose    buPROPion XL (Wellbutrin XL) 150 mg 24 hr tablet Take 1 tablet (150 mg) by mouth once daily. Do not crush, chew, or split. 30 tablet 11     Daily-Steffany tablet TAKE 1 TABLET BY MOUTH ONCE DAILY. DO NOT START BEFORE 2023. 60 tablet 0     famotidine (Pepcid) 20 mg tablet Take 1 tablet (20 mg) by mouth 2 times a day. 120 tablet 0     [] folic acid (Folvite) 1 mg tablet TAKE 1 TABLET (1 MG) BY MOUTH ONCE DAILY. DO NOT START BEFORE 2023. 30 tablet 0     gabapentin (Neurontin) 300 mg capsule Take 1 capsule (300 mg) by mouth 3 times a day AND 2 capsules (600 mg) once daily at bedtime. 150 capsule 1     [] multivitamin with minerals tablet Take 1 tablet by mouth once daily. Do not start before 2023. 60 tablet 0        Objective    Last Vitals  Temp Pulse Resp BP MAP O2 Sat   36.4 °C (97.5 °F) 74 16 131/85   100 %     Physical Examination  GENERAL: Examination reveals a well developed, well nourished, gravid female in no acute distress. She is alert and cooperative.  HEENT:  atraumatic, PERRLA, no tenderness, swelling, step-off noted    NECK:  strength intact, full ROM, c-spine TTP  ABDOMEN: soft, gravid,  nontender, nondistended, no abnormal masses, no epigastric pain  CERVIX: Closed/thick/high  NEUROLOGICAL: alert, oriented, normal speech, no focal findings or movement disorder noted; GCS 15  PSYCHOLOGICAL: awake and alert; oriented to person, place, and time    Non-Stress Test   Baseline Fetal Heart Rate for Non-Stress Test: 145 BPM  Variability in Waveform for Non-Stress Test: Moderate  Accelerations in Non-Stress Test: Yes, greater than/equal to 15 bpm, lasting at least 15 seconds  Decelerations in Non-Stress Test: None  Contractions in Non-Stress Test: Irregular (occasional irritability)  NST Contraction Frequency: rare, spacing  Interpretation of Non-Stress Test   Interpretation of Non-Stress Test: Reactive     Lab Review  Lab Results   Component Value Date    WBC 6.7 01/23/2024    HGB 9.9 (L) 01/23/2024    HCT 31.0 (L) 01/23/2024     01/23/2024

## 2024-01-23 NOTE — CONSULTS
[facility]  TRAUMA SERVICE - HISTORY AND PHYSICAL / CONSULT    Patient Name: Arlen Braga  MRN: [id number]  Admit Date: 123  : 1995  AGE: 28 y.o.   GENDER: female  ==============================================================================  MECHANISM OF INJURY / CHIEF COMPLAINT:   Patient is 34wk GA who presented to OB triage s/p ground level fall after slipping on the ice.  States that when she slipped she struck her head and neck on the ground. Denies LOC. Ambulatory after incident.  After OB evaluation, patient reporting HA and cervical spine ttp for which trauma surgery consulted.  Patient states that she had an episode of blurry vision as well as N/V after the fall (both resolved at time of evaluation and N/V not new). She reports pain to head/neck, back, and L inferior chest.     LOC (yes/no?): no  Anticoagulant / Anti-platelet Rx? (for what dx?): no  Referring Facility Name (N/A for scene EMR run): n/a    INJURIES/PROBLEMS:   C/T/L spine ttp  LUQ/inferior chest wall ttp  L posterior flank ttp  Hx asthma, pancreatitis    INCIDENTAL FINDINGS:  N/a    ==============================================================================  ADMISSION PLAN OF CARE:  Continue care on L&D  CT head/cervical spine  CXR  CBC now and 6h from now; may need further labs/imaging pending results of trend  Of note, patient appears to have chronic anemia ~8 from hospitalization in   Trauma to continue to follow and perform exam in AM  Consultants notified (specialty, provider name, time): n/a    Dispo: continue care under L&D.    Total face to face time spent with patient/family of 20 minutes, with >50% of the time spent discussing plan of care/management, counseling/educating on disease processes, explaining results of diagnostic testing.     Patient discussed with attending, Dr. Nayeli Scott PA-C  Trauma, Critical Care, and Acute Care Surgery  [id number]     Plan  update:  -CTH and C-spine were negative for acute injuries   -C-collar cleared by trauma team  -tertiary exam performed showing no new acute findings   -Neck pain present still that appears musculoskeletal in nature, recommend robaxin or muscle relaxers that are safe in pregnancy   -hgb stable throughout stay    Disposition: Trauma signing off, patient to be discharged by L&D    Patient discussed with Dr. Lori Crandall PA-C  Trauma Surgery  [id number]  ==============================================================================  PAST MEDICAL HISTORY:   PMH: Pancreatitis, Asthma, Depression  Past Medical History:   Diagnosis Date    Anemia     Depression      Last menstrual period: Currently 34wk OB    PSH:   Past Surgical History:   Procedure Laterality Date    WISDOM TOOTH EXTRACTION Bilateral     pt unsure when     FH: Denies  No family history on file.  SOCIAL HISTORY:    Smoking: daily   Social History     Tobacco Use   Smoking Status Every Day    Types: Cigarettes   Smokeless Tobacco Never   Tobacco Comments    Pt states she is currently in a program to quit. Does not want smoking cessation information at this time       Alcohol: not since current pregnancy   Social History     Substance and Sexual Activity   Alcohol Use Yes    Alcohol/week: 4.0 standard drinks of alcohol    Types: 4 Standard drinks or equivalent per week    Comment: adair sun       Drug use: denies    MEDICATIONS: See EMR  Prior to Admission medications    Medication Sig Start Date End Date Taking? Authorizing Provider   buPROPion XL (Wellbutrin XL) 150 mg 24 hr tablet Take 1 tablet (150 mg) by mouth once daily. Do not crush, chew, or split. 11/28/23 11/27/24  Romero Park MD   Daily-Steffany tablet TAKE 1 TABLET BY MOUTH ONCE DAILY. DO NOT START BEFORE NOVEMBER 23, 2023. 1/22/24   Dianna Bermudez MD   famotidine (Pepcid) 20 mg tablet Take 1 tablet (20 mg) by mouth 2 times a day. 11/22/23 1/21/24  Dianna Bermudez MD    folic acid (Folvite) 1 mg tablet TAKE 1 TABLET (1 MG) BY MOUTH ONCE DAILY. DO NOT START BEFORE NOVEMBER 23, 2023. 12/21/23 1/20/24  Dianna Bermudez MD   gabapentin (Neurontin) 300 mg capsule Take 1 capsule (300 mg) by mouth 3 times a day AND 2 capsules (600 mg) once daily at bedtime. 11/28/23 1/27/24  Kathryn Dotson MD   multivitamin with minerals tablet Take 1 tablet by mouth once daily. Do not start before November 23, 2023. 11/23/23 1/22/24  Dianna Bermudez MD     ALLERGIES: NKDA  No Known Allergies    REVIEW OF SYSTEMS:  Review of Systems   Constitutional:  Negative for activity change and appetite change.   HENT:  Negative for sore throat.    Respiratory:  Negative for shortness of breath and wheezing.    Cardiovascular:  Negative for chest pain.   Gastrointestinal:  Positive for nausea and vomiting. Negative for abdominal distention and abdominal pain.   Genitourinary:  Positive for flank pain.   Musculoskeletal:  Positive for back pain and neck pain.   Neurological:  Positive for headaches. Negative for seizures, weakness and numbness.   Psychiatric/Behavioral:  Negative for agitation and confusion.      PHYSICAL EXAM:  PRIMARY SURVEY:  Airway  Airway is patent.     Breathing  Breathing is normal. Right breath sounds are normal. Left breath sounds are normal.     Circulation  Cardiac rhythm is regular. Rate is regular.   Pulses  Radial: 2+ on the right; 2+ on the left.  Pedal: 2+ on the right; 2+ on the left.    Disability  [place] Coma Score  Eye:4   Verbal:5   Motor:6      15  Pupils  Right Pupil:   round and reactive        Left Pupil:   round and reactive           Motor Strength   strength:  5/5 on the right  5/5 on the left  Dorsiflex strength:  5/5 on the right  5/5 on the left  Plantarflex strength:  5/5 on the right  5/5 on the left  The patient does not have a sensory deficit.       SECONDARY SURVEY/PHYSICAL EXAM:  Physical Exam  Constitutional:       General: She is not in acute  "distress.  HENT:      Head: Normocephalic and atraumatic.      Right Ear: External ear normal.      Left Ear: External ear normal.      Nose: Nose normal.      Mouth/Throat:      Pharynx: Oropharynx is clear.   Eyes:      Extraocular Movements: Extraocular movements intact.      Pupils: Pupils are equal, round, and reactive to light.   Neck:      Comments: Midline ttp with extension to the L paraspinal cervical musculature  Cardiovascular:      Rate and Rhythm: Normal rate and regular rhythm.   Pulmonary:      Effort: Pulmonary effort is normal. No respiratory distress.      Breath sounds: Normal breath sounds.   Chest:      Chest wall: No tenderness.   Abdominal:      Tenderness: There is abdominal tenderness. There is no guarding.      Comments: Protuberant, gravid abdomen. Nontender to palpation globally.  Mild ttp from L interior/anterior chest wall ext to LUQ.  TTP L posterior flank w/o overlying ecchymosis/abrasions   Musculoskeletal:         General: No deformity or signs of injury.      Cervical back: Tenderness present.      Comments: Mild midline T spine ttp. Low lumbar/sacral ttp   Neurological:      General: No focal deficit present.      Mental Status: She is alert and oriented to person, place, and time.      Sensory: No sensory deficit.      Motor: No weakness.   Psychiatric:         Mood and Affect: Mood normal.         Behavior: Behavior normal.       IMAGING SUMMARY:  (summary of findings, not a copy of dictation)  CT Head/Face: no acute findings   CT C-Spine: no acute findings   CT Chest/Abd/Pelvis: n/a  CXR/PXR: no acute findings   Other(s): n/a    LABS:  Results from last 7 days   Lab Units 01/23/24  1720   WBC AUTO x10*3/uL 6.7   HEMOGLOBIN g/dL 9.9*   HEMATOCRIT % 31.0*   PLATELETS AUTO x10*3/uL 181               No lab exists for component: \"LABALBU\"            I have reviewed all laboratory and imaging results ordered/pertinent for this encounter.     "

## 2024-01-23 NOTE — ED TRIAGE NOTES
PT IS 33 WEEKS PREGNANT AND FELL ON ICE TODAY AROUND 0700, DENIES LOC, DENIES TAKING BLOOD THINNERS, C/O HEADACHE/EYE PAIN/VISION CHANGES THAT SHE STATES ARE SUBSIDING/BACK PAIN/ABD PAIN, DENIES VAGINAL BLEEDING

## 2024-02-05 ENCOUNTER — TELEPHONE (OUTPATIENT)
Dept: PEDIATRICS | Facility: CLINIC | Age: 29
End: 2024-02-05
Payer: COMMERCIAL

## 2024-02-06 ENCOUNTER — TELEPHONE (OUTPATIENT)
Dept: OBSTETRICS AND GYNECOLOGY | Facility: CLINIC | Age: 29
End: 2024-02-06

## 2024-02-06 ENCOUNTER — OFFICE VISIT (OUTPATIENT)
Dept: OBSTETRICS AND GYNECOLOGY | Facility: CLINIC | Age: 29
End: 2024-02-06
Payer: COMMERCIAL

## 2024-02-06 VITALS
WEIGHT: 145 LBS | DIASTOLIC BLOOD PRESSURE: 86 MMHG | HEART RATE: 83 BPM | SYSTOLIC BLOOD PRESSURE: 135 MMHG | BODY MASS INDEX: 24.89 KG/M2

## 2024-02-06 DIAGNOSIS — K21.9 GASTROESOPHAGEAL REFLUX DISEASE WITHOUT ESOPHAGITIS: ICD-10-CM

## 2024-02-06 DIAGNOSIS — J45.909 ASTHMA, UNSPECIFIED ASTHMA SEVERITY, UNSPECIFIED WHETHER COMPLICATED, UNSPECIFIED WHETHER PERSISTENT (HHS-HCC): ICD-10-CM

## 2024-02-06 DIAGNOSIS — F10.20 SEVERE ALCOHOL USE DISORDER (MULTI): ICD-10-CM

## 2024-02-06 DIAGNOSIS — I10 CHRONIC HYPERTENSION: ICD-10-CM

## 2024-02-06 DIAGNOSIS — O09.93 HIGH-RISK PREGNANCY IN THIRD TRIMESTER (HHS-HCC): Primary | ICD-10-CM

## 2024-02-06 DIAGNOSIS — Z23 NEED FOR TDAP VACCINATION: ICD-10-CM

## 2024-02-06 DIAGNOSIS — O99.332 TOBACCO SMOKING AFFECTING PREGNANCY IN SECOND TRIMESTER (HHS-HCC): ICD-10-CM

## 2024-02-06 DIAGNOSIS — Z3A.36 36 WEEKS GESTATION OF PREGNANCY (HHS-HCC): ICD-10-CM

## 2024-02-06 DIAGNOSIS — K85.20 ALCOHOL-INDUCED ACUTE PANCREATITIS, UNSPECIFIED COMPLICATION STATUS (HHS-HCC): ICD-10-CM

## 2024-02-06 PROCEDURE — 87081 CULTURE SCREEN ONLY: CPT | Performed by: OBSTETRICS & GYNECOLOGY

## 2024-02-06 PROCEDURE — 99214 OFFICE O/P EST MOD 30 MIN: CPT | Performed by: OBSTETRICS & GYNECOLOGY

## 2024-02-06 PROCEDURE — 80321 ALCOHOLS BIOMARKERS 1OR 2: CPT | Performed by: OBSTETRICS & GYNECOLOGY

## 2024-02-06 PROCEDURE — 90715 TDAP VACCINE 7 YRS/> IM: CPT | Performed by: OBSTETRICS & GYNECOLOGY

## 2024-02-06 PROCEDURE — 80307 DRUG TEST PRSMV CHEM ANLYZR: CPT | Performed by: OBSTETRICS & GYNECOLOGY

## 2024-02-06 PROCEDURE — 3079F DIAST BP 80-89 MM HG: CPT | Performed by: OBSTETRICS & GYNECOLOGY

## 2024-02-06 PROCEDURE — 99214 OFFICE O/P EST MOD 30 MIN: CPT | Mod: TH | Performed by: OBSTETRICS & GYNECOLOGY

## 2024-02-06 PROCEDURE — 3075F SYST BP GE 130 - 139MM HG: CPT | Performed by: OBSTETRICS & GYNECOLOGY

## 2024-02-06 RX ORDER — PANTOPRAZOLE SODIUM 20 MG/1
20 TABLET, DELAYED RELEASE ORAL
Qty: 60 TABLET | Refills: 0 | Status: SHIPPED | OUTPATIENT
Start: 2024-02-06 | End: 2024-02-22 | Stop reason: HOSPADM

## 2024-02-06 RX ORDER — NICOTINE 7MG/24HR
1 PATCH, TRANSDERMAL 24 HOURS TRANSDERMAL EVERY 24 HOURS
Qty: 30 PATCH | Refills: 0 | Status: SHIPPED | OUTPATIENT
Start: 2024-02-06 | End: 2024-05-21 | Stop reason: ALTCHOICE

## 2024-02-06 RX ORDER — MICONAZOLE NITRATE 2 %
2 CREAM (GRAM) TOPICAL AS NEEDED
Qty: 100 EACH | Refills: 3 | Status: SHIPPED | OUTPATIENT
Start: 2024-02-06 | End: 2024-02-22 | Stop reason: HOSPADM

## 2024-02-06 RX ORDER — HYDROGEN PEROXIDE 3 %
20 SOLUTION, NON-ORAL MISCELLANEOUS
Qty: 30 CAPSULE | Refills: 11 | Status: SHIPPED | OUTPATIENT
Start: 2024-02-06 | End: 2024-02-06

## 2024-02-06 NOTE — TELEPHONE ENCOUNTER
----- Message from Florencia Torrez RN sent at 2/6/2024 11:25 AM EST -----  Regarding: IOL  IOL at 38 weeks GA for CHTN per Dr Dotson

## 2024-02-06 NOTE — LETTER
2/6/2024    To Whom It May Concern:    Arlen Braga 1995 is being followed for her pregnancy at [facility].  Estimated Date of Delivery: 3/4/24    Sincerely,        Kathryn Dotson MD  [facility]

## 2024-02-06 NOTE — PROGRESS NOTES
Pt here for first outpatient OB visit.    GBS collected  2nd tri labs to be done  Alcoholic pancreatitis and AUD  - states she is using a lot less, states she has only drank twice since leaving her hospital admission in November   - Feels she is functional when drinking   - Repeat liver enzymes, amylase, lipase  - Urine ethyl glucuronide to confirm she does not need escalation of AUD care at this time  Tobacco Use  - having a hard time quitting, at 10cigs/day  - states [employer] program has been unreliable  - states stress makes her smoke and sometimes thinks she has nicotine withdrawal  - Start 7mg patch + 2mg nicotine lozenges for breakthrough  Asthma  - stable on albuterol  CHTN based on elevated BP outside of pregnancy  - BP mild without meds  - Growth this Friday on 2/20 at [facility]  - Plan for IOL at 38 weeks  MADDI/MDD  - Resumed Gabapentin 300mg TID a few days ago  - Resume Wellbutrin 150mg XL  GERD  - start Pantoprazole 20mg daily  Psychosocial needs  - Has car seat  - Has a bassinet  - Needs to sign up for WIC  - Staying with her parents for now    Discussed scheduling IOL and pt interested in IOL at 38 weeks for CHTN without meds    Kathryn Dotson MD

## 2024-02-07 LAB — ETHYL GLUCURONIDE UR QL SCN: NORMAL NG/ML

## 2024-02-08 LAB — GP B STREP GENITAL QL CULT: ABNORMAL

## 2024-02-08 RX ORDER — HYDROGEN PEROXIDE 3 %
20 SOLUTION, NON-ORAL MISCELLANEOUS
Qty: 30 CAPSULE | Refills: 11 | OUTPATIENT
Start: 2024-02-08 | End: 2025-02-07

## 2024-02-09 ENCOUNTER — APPOINTMENT (OUTPATIENT)
Dept: RADIOLOGY | Facility: CLINIC | Age: 29
End: 2024-02-09
Payer: COMMERCIAL

## 2024-02-09 LAB
ETHYL GLUCURONIDE UR CFM-MCNC: 3183 NG/ML
ETHYL SULFATE UR CFM-MCNC: 736 NG/ML

## 2024-02-12 ENCOUNTER — TELEPHONE (OUTPATIENT)
Dept: PEDIATRICS | Facility: CLINIC | Age: 29
End: 2024-02-12
Payer: COMMERCIAL

## 2024-02-12 NOTE — TELEPHONE ENCOUNTER
JOJO PIPER was able to reach the pt to remind her of her appointment tomorrow.  Pt stated that she will be present.

## 2024-02-13 ENCOUNTER — OFFICE VISIT (OUTPATIENT)
Dept: OBSTETRICS AND GYNECOLOGY | Facility: CLINIC | Age: 29
End: 2024-02-13
Payer: COMMERCIAL

## 2024-02-13 ENCOUNTER — APPOINTMENT (OUTPATIENT)
Dept: OBSTETRICS AND GYNECOLOGY | Facility: CLINIC | Age: 29
End: 2024-02-13
Payer: COMMERCIAL

## 2024-02-13 ENCOUNTER — LAB (OUTPATIENT)
Dept: LAB | Facility: LAB | Age: 29
End: 2024-02-13
Payer: COMMERCIAL

## 2024-02-13 VITALS
WEIGHT: 146 LBS | DIASTOLIC BLOOD PRESSURE: 88 MMHG | HEART RATE: 93 BPM | BODY MASS INDEX: 25.06 KG/M2 | SYSTOLIC BLOOD PRESSURE: 130 MMHG

## 2024-02-13 DIAGNOSIS — J45.909 ASTHMA, UNSPECIFIED ASTHMA SEVERITY, UNSPECIFIED WHETHER COMPLICATED, UNSPECIFIED WHETHER PERSISTENT (HHS-HCC): ICD-10-CM

## 2024-02-13 DIAGNOSIS — K85.20 ALCOHOL-INDUCED ACUTE PANCREATITIS, UNSPECIFIED COMPLICATION STATUS (HHS-HCC): ICD-10-CM

## 2024-02-13 DIAGNOSIS — I10 CHRONIC HYPERTENSION: Primary | ICD-10-CM

## 2024-02-13 DIAGNOSIS — O09.93 HIGH-RISK PREGNANCY IN THIRD TRIMESTER (HHS-HCC): ICD-10-CM

## 2024-02-13 DIAGNOSIS — Z3A.36 36 WEEKS GESTATION OF PREGNANCY (HHS-HCC): ICD-10-CM

## 2024-02-13 DIAGNOSIS — O09.92 SUPERVISION OF HIGH RISK PREGNANCY IN SECOND TRIMESTER (HHS-HCC): ICD-10-CM

## 2024-02-13 DIAGNOSIS — O99.332 TOBACCO SMOKING AFFECTING PREGNANCY IN SECOND TRIMESTER (HHS-HCC): ICD-10-CM

## 2024-02-13 DIAGNOSIS — F10.10 MILD ALCOHOL USE DISORDER: ICD-10-CM

## 2024-02-13 LAB
ERYTHROCYTE [DISTWIDTH] IN BLOOD BY AUTOMATED COUNT: 12.9 % (ref 11.5–14.5)
HCT VFR BLD AUTO: 34.1 % (ref 36–46)
HGB BLD-MCNC: 10.7 G/DL (ref 12–16)
MCH RBC QN AUTO: 29.8 PG (ref 26–34)
MCHC RBC AUTO-ENTMCNC: 31.4 G/DL (ref 32–36)
MCV RBC AUTO: 95 FL (ref 80–100)
NRBC BLD-RTO: 0 /100 WBCS (ref 0–0)
PLATELET # BLD AUTO: 185 X10*3/UL (ref 150–450)
RBC # BLD AUTO: 3.59 X10*6/UL (ref 4–5.2)
WBC # BLD AUTO: 8.2 X10*3/UL (ref 4.4–11.3)

## 2024-02-13 PROCEDURE — 99214 OFFICE O/P EST MOD 30 MIN: CPT | Performed by: OBSTETRICS & GYNECOLOGY

## 2024-02-13 PROCEDURE — 85027 COMPLETE CBC AUTOMATED: CPT

## 2024-02-13 PROCEDURE — 82150 ASSAY OF AMYLASE: CPT

## 2024-02-13 PROCEDURE — 3075F SYST BP GE 130 - 139MM HG: CPT | Performed by: OBSTETRICS & GYNECOLOGY

## 2024-02-13 PROCEDURE — 82947 ASSAY GLUCOSE BLOOD QUANT: CPT

## 2024-02-13 PROCEDURE — 83690 ASSAY OF LIPASE: CPT

## 2024-02-13 PROCEDURE — 86850 RBC ANTIBODY SCREEN: CPT

## 2024-02-13 PROCEDURE — 82728 ASSAY OF FERRITIN: CPT

## 2024-02-13 PROCEDURE — 82746 ASSAY OF FOLIC ACID SERUM: CPT

## 2024-02-13 PROCEDURE — 59025 FETAL NON-STRESS TEST: CPT | Performed by: OBSTETRICS & GYNECOLOGY

## 2024-02-13 PROCEDURE — 83550 IRON BINDING TEST: CPT

## 2024-02-13 PROCEDURE — 86900 BLOOD TYPING SEROLOGIC ABO: CPT

## 2024-02-13 PROCEDURE — 99214 OFFICE O/P EST MOD 30 MIN: CPT | Mod: TH | Performed by: OBSTETRICS & GYNECOLOGY

## 2024-02-13 PROCEDURE — 36415 COLL VENOUS BLD VENIPUNCTURE: CPT

## 2024-02-13 PROCEDURE — 80053 COMPREHEN METABOLIC PANEL: CPT

## 2024-02-13 PROCEDURE — 86901 BLOOD TYPING SEROLOGIC RH(D): CPT

## 2024-02-13 PROCEDURE — 3079F DIAST BP 80-89 MM HG: CPT | Performed by: OBSTETRICS & GYNECOLOGY

## 2024-02-13 PROCEDURE — 82607 VITAMIN B-12: CPT

## 2024-02-13 PROCEDURE — 86780 TREPONEMA PALLIDUM: CPT

## 2024-02-13 NOTE — LETTER
Arlen Braga 1995    INSTRUCTIONS FOR INDUCTION OF LABOR    Thank you for trusting us with your care!    YOU HAVE BEEN SCHEDULED FOR INDUCTION OF LABOR ON 2/19/2024  Please ARRIVE at [facility] 2nd floor (Labor and Delivery) located at [facility]- [address] [place] [address],   BY 11:00 am    You may eat and drink up until your arrival at the hospital.  At this time you may have 2 visitors with you on Labor and Delivery.  If you need to go into surgery for any reason, only one visitor will be allowed to go with you.    On occasion, Labor and Delivery can be very busy.  This may require us to delay or reschedule your induction.  We understand the inconvenience of such an event and apologize ahead of time if this occurs.    If this happens, a Nurse will call to inform you of the delay, give you a new arrival time or ask you to call [phone] at a specific time to determine an arrival time.  Thank you for your understanding and patience.     If you have any questions or need to speak with your doctor, please call  nurse Don at [phone]. Thank you.

## 2024-02-13 NOTE — PROGRESS NOTES
GBS pos    2nd tri labs to be done    Alcoholic pancreatitis and AUD  - Thinks she needs more treatment and support. Doesn't do well with groups - feels judged often. Has some hesitancy about individual therapy too. Trying to figure out her triggers. Referred to [facility].  - Would like Vivitrol and disulfaram postpartum.  - Interested in Disulfiram postpartum.  - Repeat liver enzymes, amylase, lipase    Tobacco Use  - States she is smoking less cigarettes  - states [employer] program has been unreliable  - states stress makes her smoke and sometimes thinks she has nicotine withdrawal  - Tried 7mg patch once. Also has lozenges for breakthrough. Not consistent with use. But does note she bought one less pack of cigarettes this week.    Asthma  - stable on albuterol    CHTN based on elevated BP outside of pregnancy  - BP mild without meds  - Has growth tomorrow.  - Scheduled for IOL at 38 weeks    MADDI/MDD  - Resumed Gabapentin 300mg TID a few days ago. Says she take this one the most consistently.  - Resume Wellbutrin 150mg XL    GERD  - didn't start Pantoprazole 20mg daily, says she hasn't been eating much so GERD hasn't been as bad    Last shift 2/10. Scheduled for IOL on Monday.  Decreased FM.    NST for decreased FM    FHT: 130, mod lizz, + accel, - decel  [place]: occasional, not felt by patient  Interpretation: reactive.      Kathryn Dotson MD

## 2024-02-14 ENCOUNTER — HOSPITAL ENCOUNTER (OUTPATIENT)
Dept: RADIOLOGY | Facility: CLINIC | Age: 29
Discharge: HOME | End: 2024-02-14
Payer: COMMERCIAL

## 2024-02-14 DIAGNOSIS — Z01.818 PREOP TESTING: Primary | ICD-10-CM

## 2024-02-14 DIAGNOSIS — I10 CHRONIC HYPERTENSION: ICD-10-CM

## 2024-02-14 LAB
ABO GROUP (TYPE) IN BLOOD: NORMAL
ALBUMIN SERPL BCP-MCNC: 3.4 G/DL (ref 3.4–5)
ALP SERPL-CCNC: 92 U/L (ref 33–110)
ALT SERPL W P-5'-P-CCNC: 16 U/L (ref 7–45)
AMYLASE SERPL-CCNC: 52 U/L (ref 29–103)
ANION GAP SERPL CALC-SCNC: 13 MMOL/L (ref 10–20)
ANTIBODY SCREEN: NORMAL
AST SERPL W P-5'-P-CCNC: 14 U/L (ref 9–39)
BILIRUB SERPL-MCNC: 0.3 MG/DL (ref 0–1.2)
BUN SERPL-MCNC: 6 MG/DL (ref 6–23)
CALCIUM SERPL-MCNC: 8.8 MG/DL (ref 8.6–10.6)
CHLORIDE SERPL-SCNC: 103 MMOL/L (ref 98–107)
CO2 SERPL-SCNC: 24 MMOL/L (ref 21–32)
CREAT SERPL-MCNC: 0.55 MG/DL (ref 0.5–1.05)
EGFRCR SERPLBLD CKD-EPI 2021: >90 ML/MIN/1.73M*2
FERRITIN SERPL-MCNC: 11 NG/ML
FOLATE SERPL-MCNC: 11.1 NG/ML
GLUCOSE 1H P 50 G GLC PO SERPL-MCNC: 117 MG/DL
GLUCOSE SERPL-MCNC: 117 MG/DL (ref 74–99)
IRON SATN MFR SERPL: NORMAL %
IRON SERPL-MCNC: 53 UG/DL
LIPASE SERPL-CCNC: 32 U/L (ref 9–82)
POTASSIUM SERPL-SCNC: 3.4 MMOL/L (ref 3.5–5.3)
PROT SERPL-MCNC: 6 G/DL (ref 6.4–8.2)
REFLEX ADDED, ANEMIA PANEL: NORMAL
RH FACTOR (ANTIGEN D): NORMAL
SODIUM SERPL-SCNC: 137 MMOL/L (ref 136–145)
TIBC SERPL-MCNC: NORMAL UG/DL
TREPONEMA PALLIDUM IGG+IGM AB [PRESENCE] IN SERUM OR PLASMA BY IMMUNOASSAY: NONREACTIVE
UIBC SERPL-MCNC: >450 UG/DL
VIT B12 SERPL-MCNC: 194 PG/ML

## 2024-02-14 PROCEDURE — 76819 FETAL BIOPHYS PROFIL W/O NST: CPT

## 2024-02-14 PROCEDURE — 76819 FETAL BIOPHYS PROFIL W/O NST: CPT | Performed by: OBSTETRICS & GYNECOLOGY

## 2024-02-14 PROCEDURE — 76816 OB US FOLLOW-UP PER FETUS: CPT | Performed by: OBSTETRICS & GYNECOLOGY

## 2024-02-14 PROCEDURE — 76816 OB US FOLLOW-UP PER FETUS: CPT

## 2024-02-15 RX ORDER — TERBUTALINE SULFATE 1 MG/ML
0.25 INJECTION SUBCUTANEOUS ONCE
Status: CANCELLED | OUTPATIENT
Start: 2024-02-15 | End: 2024-02-15

## 2024-02-16 ENCOUNTER — LAB (OUTPATIENT)
Dept: LAB | Facility: LAB | Age: 29
End: 2024-02-16
Payer: COMMERCIAL

## 2024-02-16 DIAGNOSIS — Z01.818 PREOP TESTING: ICD-10-CM

## 2024-02-16 LAB
ABO GROUP (TYPE) IN BLOOD: NORMAL
ANTIBODY SCREEN: NORMAL
ERYTHROCYTE [DISTWIDTH] IN BLOOD BY AUTOMATED COUNT: 12.6 % (ref 11.5–14.5)
HCT VFR BLD AUTO: 30.6 % (ref 36–46)
HGB BLD-MCNC: 9.9 G/DL (ref 12–16)
MCH RBC QN AUTO: 30 PG (ref 26–34)
MCHC RBC AUTO-ENTMCNC: 32.4 G/DL (ref 32–36)
MCV RBC AUTO: 93 FL (ref 80–100)
NRBC BLD-RTO: 0 /100 WBCS (ref 0–0)
PLATELET # BLD AUTO: 195 X10*3/UL (ref 150–450)
RBC # BLD AUTO: 3.3 X10*6/UL (ref 4–5.2)
RH FACTOR (ANTIGEN D): NORMAL
WBC # BLD AUTO: 7.1 X10*3/UL (ref 4.4–11.3)

## 2024-02-16 PROCEDURE — 86920 COMPATIBILITY TEST SPIN: CPT

## 2024-02-16 PROCEDURE — 36415 COLL VENOUS BLD VENIPUNCTURE: CPT

## 2024-02-16 PROCEDURE — 85027 COMPLETE CBC AUTOMATED: CPT

## 2024-02-16 PROCEDURE — 86900 BLOOD TYPING SEROLOGIC ABO: CPT

## 2024-02-16 PROCEDURE — 86850 RBC ANTIBODY SCREEN: CPT

## 2024-02-16 PROCEDURE — 86901 BLOOD TYPING SEROLOGIC RH(D): CPT

## 2024-02-16 NOTE — TELEPHONE ENCOUNTER
----- Message from Florencia Torrez, RN sent at 2/14/2024  4:03 PM EST -----  Regarding: Raffaele Rice on ultrasound today per Dr Maldonado.  IOL to be changed to ECV 2/19  New time 1130, arrive 0930  NPO after midnight  Labs to be done Friday  Patient aware  Message to Dr Dotson to put in prep for procedure orders

## 2024-02-19 ENCOUNTER — ANESTHESIA (OUTPATIENT)
Dept: OBSTETRICS AND GYNECOLOGY | Facility: HOSPITAL | Age: 29
End: 2024-02-19
Payer: COMMERCIAL

## 2024-02-19 ENCOUNTER — ANESTHESIA (OUTPATIENT)
Dept: POSTPARTUM | Facility: HOSPITAL | Age: 29
End: 2024-02-19
Payer: COMMERCIAL

## 2024-02-19 ENCOUNTER — HOSPITAL ENCOUNTER (INPATIENT)
Facility: HOSPITAL | Age: 29
LOS: 3 days | Discharge: HOME | End: 2024-02-22
Attending: OBSTETRICS & GYNECOLOGY | Admitting: OBSTETRICS & GYNECOLOGY
Payer: COMMERCIAL

## 2024-02-19 ENCOUNTER — ANESTHESIA EVENT (OUTPATIENT)
Dept: OBSTETRICS AND GYNECOLOGY | Facility: HOSPITAL | Age: 29
End: 2024-02-19
Payer: COMMERCIAL

## 2024-02-19 DIAGNOSIS — F41.9 ANXIETY AND DEPRESSION: Primary | ICD-10-CM

## 2024-02-19 DIAGNOSIS — I10 CHRONIC HYPERTENSION: ICD-10-CM

## 2024-02-19 DIAGNOSIS — F32.A ANXIETY AND DEPRESSION: Primary | ICD-10-CM

## 2024-02-19 PROBLEM — R51.9 CHRONIC INTRACTABLE HEADACHE: Status: ACTIVE | Noted: 2024-02-19

## 2024-02-19 PROBLEM — G89.29 CHRONIC INTRACTABLE HEADACHE: Status: ACTIVE | Noted: 2024-02-19

## 2024-02-19 LAB
ABO GROUP (TYPE) IN BLOOD: NORMAL
ALBUMIN SERPL BCP-MCNC: 3.3 G/DL (ref 3.4–5)
ALP SERPL-CCNC: 105 U/L (ref 33–110)
ALT SERPL W P-5'-P-CCNC: 12 U/L (ref 7–45)
ANION GAP SERPL CALC-SCNC: 12 MMOL/L (ref 10–20)
ANTIBODY SCREEN: NORMAL
AST SERPL W P-5'-P-CCNC: 15 U/L (ref 9–39)
BASE EXCESS BLDCOV CALC-SCNC: -5.5 MMOL/L (ref -8.1–-0.5)
BILIRUB SERPL-MCNC: 0.3 MG/DL (ref 0–1.2)
BLOOD EXPIRATION DATE: NORMAL
BODY TEMPERATURE: 37 DEGREES CELSIUS
BUN SERPL-MCNC: 6 MG/DL (ref 6–23)
CALCIUM SERPL-MCNC: 8.8 MG/DL (ref 8.6–10.6)
CHLORIDE SERPL-SCNC: 106 MMOL/L (ref 98–107)
CO2 SERPL-SCNC: 23 MMOL/L (ref 21–32)
CREAT SERPL-MCNC: 0.56 MG/DL (ref 0.5–1.05)
DISPENSE STATUS: NORMAL
EGFRCR SERPLBLD CKD-EPI 2021: >90 ML/MIN/1.73M*2
ERYTHROCYTE [DISTWIDTH] IN BLOOD BY AUTOMATED COUNT: 12.8 % (ref 11.5–14.5)
GLUCOSE SERPL-MCNC: 68 MG/DL (ref 74–99)
HCO3 BLDCOV-SCNC: 23.2 MMOL/L (ref 16–26)
HCT VFR BLD AUTO: 30.5 % (ref 36–46)
HGB BLD-MCNC: 9.8 G/DL (ref 12–16)
INHALED O2 CONCENTRATION: 21 %
MCH RBC QN AUTO: 30.2 PG (ref 26–34)
MCHC RBC AUTO-ENTMCNC: 32.1 G/DL (ref 32–36)
MCV RBC AUTO: 94 FL (ref 80–100)
NRBC BLD-RTO: 0 /100 WBCS (ref 0–0)
OXYHGB MFR BLDCOV: 29.6 % (ref 94–98)
PCO2 BLDCOV: 58 MM HG (ref 22–53)
PH BLDCOV: 7.21 PH (ref 7.19–7.47)
PLATELET # BLD AUTO: 181 X10*3/UL (ref 150–450)
PO2 BLDCOV: 24 MM HG (ref 13–37)
POTASSIUM SERPL-SCNC: 3.8 MMOL/L (ref 3.5–5.3)
PRODUCT BLOOD TYPE: 7300
PRODUCT CODE: NORMAL
PROT SERPL-MCNC: 5.9 G/DL (ref 6.4–8.2)
RBC # BLD AUTO: 3.25 X10*6/UL (ref 4–5.2)
RH FACTOR (ANTIGEN D): NORMAL
SAO2 % BLDCOV: 31 % (ref 16–84)
SODIUM SERPL-SCNC: 137 MMOL/L (ref 136–145)
TREPONEMA PALLIDUM IGG+IGM AB [PRESENCE] IN SERUM OR PLASMA BY IMMUNOASSAY: NONREACTIVE
UNIT ABO: NORMAL
UNIT NUMBER: NORMAL
UNIT RH: NORMAL
UNIT VOLUME: 350
WBC # BLD AUTO: 6 X10*3/UL (ref 4.4–11.3)
XM INTEP: NORMAL

## 2024-02-19 PROCEDURE — 2500000004 HC RX 250 GENERAL PHARMACY W/ HCPCS (ALT 636 FOR OP/ED)

## 2024-02-19 PROCEDURE — 59514 CESAREAN DELIVERY ONLY: CPT | Performed by: OBSTETRICS & GYNECOLOGY

## 2024-02-19 PROCEDURE — 2500000004 HC RX 250 GENERAL PHARMACY W/ HCPCS (ALT 636 FOR OP/ED): Performed by: ANESTHESIOLOGIST ASSISTANT

## 2024-02-19 PROCEDURE — 0UH90HZ INSERTION OF CONTRACEPTIVE DEVICE INTO UTERUS, OPEN APPROACH: ICD-10-PCS | Performed by: OBSTETRICS & GYNECOLOGY

## 2024-02-19 PROCEDURE — 2500000005 HC RX 250 GENERAL PHARMACY W/O HCPCS: Performed by: ANESTHESIOLOGIST ASSISTANT

## 2024-02-19 PROCEDURE — 84075 ASSAY ALKALINE PHOSPHATASE: CPT

## 2024-02-19 PROCEDURE — A59412 PR ANTEPARTUM HEAD MANIPULATION: Performed by: ANESTHESIOLOGIST ASSISTANT

## 2024-02-19 PROCEDURE — 88307 TISSUE EXAM BY PATHOLOGIST: CPT | Performed by: STUDENT IN AN ORGANIZED HEALTH CARE EDUCATION/TRAINING PROGRAM

## 2024-02-19 PROCEDURE — 36415 COLL VENOUS BLD VENIPUNCTURE: CPT

## 2024-02-19 PROCEDURE — 59412 ANTEPARTUM MANIPULATION: CPT | Performed by: OBSTETRICS & GYNECOLOGY

## 2024-02-19 PROCEDURE — 7100000016 HC LABOR RECOVERY PER HOUR: Performed by: OBSTETRICS & GYNECOLOGY

## 2024-02-19 PROCEDURE — 86780 TREPONEMA PALLIDUM: CPT

## 2024-02-19 PROCEDURE — 85027 COMPLETE CBC AUTOMATED: CPT

## 2024-02-19 PROCEDURE — 87800 DETECT AGNT MULT DNA DIREC: CPT

## 2024-02-19 PROCEDURE — 86901 BLOOD TYPING SEROLOGIC RH(D): CPT

## 2024-02-19 PROCEDURE — A59412 PR ANTEPARTUM HEAD MANIPULATION: Performed by: PAIN MEDICINE

## 2024-02-19 PROCEDURE — 88307 TISSUE EXAM BY PATHOLOGIST: CPT | Mod: TC,SUR

## 2024-02-19 PROCEDURE — 58300 INSERT INTRAUTERINE DEVICE: CPT | Performed by: OBSTETRICS & GYNECOLOGY

## 2024-02-19 PROCEDURE — 82805 BLOOD GASES W/O2 SATURATION: CPT

## 2024-02-19 PROCEDURE — 1210000001 HC SEMI-PRIVATE ROOM DAILY

## 2024-02-19 RX ORDER — OXYTOCIN 10 [USP'U]/ML
10 INJECTION, SOLUTION INTRAMUSCULAR; INTRAVENOUS ONCE AS NEEDED
Status: DISCONTINUED | OUTPATIENT
Start: 2024-02-19 | End: 2024-02-19 | Stop reason: HOSPADM

## 2024-02-19 RX ORDER — MORPHINE SULFATE 1 MG/ML
INJECTION, SOLUTION EPIDURAL; INTRATHECAL; INTRAVENOUS AS NEEDED
Status: DISCONTINUED | OUTPATIENT
Start: 2024-02-19 | End: 2024-02-19

## 2024-02-19 RX ORDER — DIPHENHYDRAMINE HCL 25 MG
25 CAPSULE ORAL EVERY 4 HOURS PRN
Status: DISCONTINUED | OUTPATIENT
Start: 2024-02-19 | End: 2024-02-22 | Stop reason: HOSPADM

## 2024-02-19 RX ORDER — MICONAZOLE NITRATE 2 %
2 CREAM (GRAM) TOPICAL AS NEEDED
Status: DISCONTINUED | OUTPATIENT
Start: 2024-02-19 | End: 2024-02-19

## 2024-02-19 RX ORDER — MISOPROSTOL 200 UG/1
800 TABLET ORAL ONCE AS NEEDED
Status: DISCONTINUED | OUTPATIENT
Start: 2024-02-19 | End: 2024-02-19 | Stop reason: HOSPADM

## 2024-02-19 RX ORDER — MORPHINE SULFATE 0.5 MG/ML
INJECTION, SOLUTION EPIDURAL; INTRATHECAL; INTRAVENOUS AS NEEDED
Status: DISCONTINUED | OUTPATIENT
Start: 2024-02-19 | End: 2024-02-19

## 2024-02-19 RX ORDER — METOCLOPRAMIDE 10 MG/1
10 TABLET ORAL EVERY 6 HOURS PRN
Status: DISCONTINUED | OUTPATIENT
Start: 2024-02-19 | End: 2024-02-19

## 2024-02-19 RX ORDER — HYDRALAZINE HYDROCHLORIDE 20 MG/ML
5 INJECTION INTRAMUSCULAR; INTRAVENOUS ONCE AS NEEDED
Status: DISCONTINUED | OUTPATIENT
Start: 2024-02-19 | End: 2024-02-19 | Stop reason: HOSPADM

## 2024-02-19 RX ORDER — LIDOCAINE HYDROCHLORIDE 10 MG/ML
30 INJECTION INFILTRATION; PERINEURAL ONCE AS NEEDED
Status: DISCONTINUED | OUTPATIENT
Start: 2024-02-19 | End: 2024-02-19 | Stop reason: HOSPADM

## 2024-02-19 RX ORDER — OXYTOCIN 10 [USP'U]/ML
10 INJECTION, SOLUTION INTRAMUSCULAR; INTRAVENOUS ONCE AS NEEDED
Status: DISCONTINUED | OUTPATIENT
Start: 2024-02-19 | End: 2024-02-22 | Stop reason: HOSPADM

## 2024-02-19 RX ORDER — KETOROLAC TROMETHAMINE 30 MG/ML
30 INJECTION, SOLUTION INTRAMUSCULAR; INTRAVENOUS EVERY 6 HOURS
Status: COMPLETED | OUTPATIENT
Start: 2024-02-19 | End: 2024-02-20

## 2024-02-19 RX ORDER — LIDOCAINE HYDROCHLORIDE 20 MG/ML
INJECTION, SOLUTION INFILTRATION; PERINEURAL AS NEEDED
Status: DISCONTINUED | OUTPATIENT
Start: 2024-02-19 | End: 2024-02-19

## 2024-02-19 RX ORDER — TRANEXAMIC ACID 100 MG/ML
1000 INJECTION, SOLUTION INTRAVENOUS ONCE AS NEEDED
Status: DISCONTINUED | OUTPATIENT
Start: 2024-02-19 | End: 2024-02-19 | Stop reason: HOSPADM

## 2024-02-19 RX ORDER — LIDOCAINE HCL/EPINEPHRINE/PF 2%-1:200K
VIAL (ML) INJECTION AS NEEDED
Status: DISCONTINUED | OUTPATIENT
Start: 2024-02-19 | End: 2024-02-19

## 2024-02-19 RX ORDER — KETAMINE HYDROCHLORIDE 10 MG/ML
INJECTION, SOLUTION INTRAMUSCULAR; INTRAVENOUS AS NEEDED
Status: DISCONTINUED | OUTPATIENT
Start: 2024-02-19 | End: 2024-02-19

## 2024-02-19 RX ORDER — ADHESIVE BANDAGE
10 BANDAGE TOPICAL
Status: DISCONTINUED | OUTPATIENT
Start: 2024-02-19 | End: 2024-02-22 | Stop reason: HOSPADM

## 2024-02-19 RX ORDER — ENOXAPARIN SODIUM 100 MG/ML
40 INJECTION SUBCUTANEOUS EVERY 24 HOURS
Status: DISCONTINUED | OUTPATIENT
Start: 2024-02-20 | End: 2024-02-22 | Stop reason: HOSPADM

## 2024-02-19 RX ORDER — HYDROMORPHONE HYDROCHLORIDE 1 MG/ML
0.2 INJECTION, SOLUTION INTRAMUSCULAR; INTRAVENOUS; SUBCUTANEOUS EVERY 5 MIN PRN
Status: DISCONTINUED | OUTPATIENT
Start: 2024-02-19 | End: 2024-02-22 | Stop reason: HOSPADM

## 2024-02-19 RX ORDER — LOPERAMIDE HYDROCHLORIDE 2 MG/1
4 CAPSULE ORAL EVERY 2 HOUR PRN
Status: DISCONTINUED | OUTPATIENT
Start: 2024-02-19 | End: 2024-02-19 | Stop reason: HOSPADM

## 2024-02-19 RX ORDER — OXYCODONE HYDROCHLORIDE 5 MG/1
10 TABLET ORAL EVERY 4 HOURS PRN
Status: DISCONTINUED | OUTPATIENT
Start: 2024-02-20 | End: 2024-02-22 | Stop reason: HOSPADM

## 2024-02-19 RX ORDER — LIDOCAINE 560 MG/1
1 PATCH PERCUTANEOUS; TOPICAL; TRANSDERMAL
Status: DISCONTINUED | OUTPATIENT
Start: 2024-02-19 | End: 2024-02-22 | Stop reason: HOSPADM

## 2024-02-19 RX ORDER — CARBOPROST TROMETHAMINE 250 UG/ML
250 INJECTION, SOLUTION INTRAMUSCULAR ONCE AS NEEDED
Status: DISCONTINUED | OUTPATIENT
Start: 2024-02-19 | End: 2024-02-19 | Stop reason: HOSPADM

## 2024-02-19 RX ORDER — ALBUTEROL SULFATE 0.83 MG/ML
0.63 SOLUTION RESPIRATORY (INHALATION) EVERY 6 HOURS PRN
Status: DISCONTINUED | OUTPATIENT
Start: 2024-02-19 | End: 2024-02-22 | Stop reason: HOSPADM

## 2024-02-19 RX ORDER — OXYTOCIN/0.9 % SODIUM CHLORIDE 30/500 ML
60 PLASTIC BAG, INJECTION (ML) INTRAVENOUS ONCE AS NEEDED
Status: DISCONTINUED | OUTPATIENT
Start: 2024-02-19 | End: 2024-02-19 | Stop reason: HOSPADM

## 2024-02-19 RX ORDER — BUPROPION HYDROCHLORIDE 150 MG/1
150 TABLET ORAL DAILY
Status: DISCONTINUED | OUTPATIENT
Start: 2024-02-19 | End: 2024-02-22 | Stop reason: HOSPADM

## 2024-02-19 RX ORDER — PANTOPRAZOLE SODIUM 20 MG/1
20 TABLET, DELAYED RELEASE ORAL
Status: DISCONTINUED | OUTPATIENT
Start: 2024-02-20 | End: 2024-02-22 | Stop reason: HOSPADM

## 2024-02-19 RX ORDER — SODIUM CHLORIDE, SODIUM LACTATE, POTASSIUM CHLORIDE, CALCIUM CHLORIDE 600; 310; 30; 20 MG/100ML; MG/100ML; MG/100ML; MG/100ML
125 INJECTION, SOLUTION INTRAVENOUS CONTINUOUS
Status: DISCONTINUED | OUTPATIENT
Start: 2024-02-19 | End: 2024-02-20

## 2024-02-19 RX ORDER — DIPHENHYDRAMINE HYDROCHLORIDE 50 MG/ML
25 INJECTION INTRAMUSCULAR; INTRAVENOUS EVERY 4 HOURS PRN
Status: DISCONTINUED | OUTPATIENT
Start: 2024-02-19 | End: 2024-02-22 | Stop reason: HOSPADM

## 2024-02-19 RX ORDER — LOPERAMIDE HYDROCHLORIDE 2 MG/1
4 CAPSULE ORAL EVERY 2 HOUR PRN
Status: DISCONTINUED | OUTPATIENT
Start: 2024-02-19 | End: 2024-02-22 | Stop reason: HOSPADM

## 2024-02-19 RX ORDER — TERBUTALINE SULFATE 1 MG/ML
0.25 INJECTION SUBCUTANEOUS ONCE AS NEEDED
Status: COMPLETED | OUTPATIENT
Start: 2024-02-19 | End: 2024-02-19

## 2024-02-19 RX ORDER — OXYCODONE HYDROCHLORIDE 5 MG/1
5 TABLET ORAL EVERY 4 HOURS PRN
Status: DISCONTINUED | OUTPATIENT
Start: 2024-02-20 | End: 2024-02-22 | Stop reason: HOSPADM

## 2024-02-19 RX ORDER — MIDAZOLAM HYDROCHLORIDE 1 MG/ML
INJECTION, SOLUTION INTRAMUSCULAR; INTRAVENOUS AS NEEDED
Status: DISCONTINUED | OUTPATIENT
Start: 2024-02-19 | End: 2024-02-19

## 2024-02-19 RX ORDER — KETOROLAC TROMETHAMINE 30 MG/ML
INJECTION, SOLUTION INTRAMUSCULAR; INTRAVENOUS AS NEEDED
Status: DISCONTINUED | OUTPATIENT
Start: 2024-02-19 | End: 2024-02-19

## 2024-02-19 RX ORDER — CEFAZOLIN 1 G/1
INJECTION, POWDER, FOR SOLUTION INTRAVENOUS AS NEEDED
Status: DISCONTINUED | OUTPATIENT
Start: 2024-02-19 | End: 2024-02-19

## 2024-02-19 RX ORDER — ONDANSETRON 4 MG/1
4 TABLET, FILM COATED ORAL EVERY 6 HOURS PRN
Status: DISCONTINUED | OUTPATIENT
Start: 2024-02-19 | End: 2024-02-22 | Stop reason: HOSPADM

## 2024-02-19 RX ORDER — OXYTOCIN/0.9 % SODIUM CHLORIDE 30/500 ML
60 PLASTIC BAG, INJECTION (ML) INTRAVENOUS ONCE AS NEEDED
Status: DISCONTINUED | OUTPATIENT
Start: 2024-02-19 | End: 2024-02-22 | Stop reason: HOSPADM

## 2024-02-19 RX ORDER — PHENYLEPHRINE HCL IN 0.9% NACL 0.4MG/10ML
SYRINGE (ML) INTRAVENOUS AS NEEDED
Status: DISCONTINUED | OUTPATIENT
Start: 2024-02-19 | End: 2024-02-19

## 2024-02-19 RX ORDER — METHYLERGONOVINE MALEATE 0.2 MG/ML
0.2 INJECTION INTRAVENOUS ONCE AS NEEDED
Status: DISCONTINUED | OUTPATIENT
Start: 2024-02-19 | End: 2024-02-19 | Stop reason: HOSPADM

## 2024-02-19 RX ORDER — ONDANSETRON HYDROCHLORIDE 2 MG/ML
4 INJECTION, SOLUTION INTRAVENOUS EVERY 6 HOURS PRN
Status: DISCONTINUED | OUTPATIENT
Start: 2024-02-19 | End: 2024-02-19

## 2024-02-19 RX ORDER — MIDAZOLAM HYDROCHLORIDE 1 MG/ML
INJECTION INTRAMUSCULAR; INTRAVENOUS AS NEEDED
Status: DISCONTINUED | OUTPATIENT
Start: 2024-02-19 | End: 2024-02-19

## 2024-02-19 RX ORDER — LABETALOL HYDROCHLORIDE 5 MG/ML
20 INJECTION, SOLUTION INTRAVENOUS ONCE AS NEEDED
Status: DISCONTINUED | OUTPATIENT
Start: 2024-02-19 | End: 2024-02-19 | Stop reason: HOSPADM

## 2024-02-19 RX ORDER — NIFEDIPINE 10 MG/1
10 CAPSULE ORAL ONCE AS NEEDED
Status: DISCONTINUED | OUTPATIENT
Start: 2024-02-19 | End: 2024-02-22 | Stop reason: HOSPADM

## 2024-02-19 RX ORDER — NICOTINE 7MG/24HR
1 PATCH, TRANSDERMAL 24 HOURS TRANSDERMAL EVERY 24 HOURS
Status: DISCONTINUED | OUTPATIENT
Start: 2024-02-19 | End: 2024-02-22 | Stop reason: HOSPADM

## 2024-02-19 RX ORDER — CARBOPROST TROMETHAMINE 250 UG/ML
250 INJECTION, SOLUTION INTRAMUSCULAR ONCE AS NEEDED
Status: DISCONTINUED | OUTPATIENT
Start: 2024-02-19 | End: 2024-02-22 | Stop reason: HOSPADM

## 2024-02-19 RX ORDER — BUTORPHANOL TARTRATE 2 MG/ML
1 INJECTION INTRAMUSCULAR; INTRAVENOUS
Status: DISCONTINUED | OUTPATIENT
Start: 2024-02-19 | End: 2024-02-22 | Stop reason: HOSPADM

## 2024-02-19 RX ORDER — FAMOTIDINE 10 MG/ML
INJECTION INTRAVENOUS AS NEEDED
Status: DISCONTINUED | OUTPATIENT
Start: 2024-02-19 | End: 2024-02-19

## 2024-02-19 RX ORDER — BISACODYL 10 MG/1
10 SUPPOSITORY RECTAL DAILY PRN
Status: DISCONTINUED | OUTPATIENT
Start: 2024-02-19 | End: 2024-02-22 | Stop reason: HOSPADM

## 2024-02-19 RX ORDER — ONDANSETRON HYDROCHLORIDE 2 MG/ML
4 INJECTION, SOLUTION INTRAVENOUS EVERY 6 HOURS PRN
Status: DISCONTINUED | OUTPATIENT
Start: 2024-02-19 | End: 2024-02-22 | Stop reason: HOSPADM

## 2024-02-19 RX ORDER — FENTANYL CITRATE 50 UG/ML
INJECTION, SOLUTION INTRAMUSCULAR; INTRAVENOUS AS NEEDED
Status: DISCONTINUED | OUTPATIENT
Start: 2024-02-19 | End: 2024-02-19

## 2024-02-19 RX ORDER — ALBUTEROL SULFATE 0.63 MG/3ML
0.63 SOLUTION RESPIRATORY (INHALATION) EVERY 6 HOURS PRN
COMMUNITY
End: 2024-02-22 | Stop reason: HOSPADM

## 2024-02-19 RX ORDER — LABETALOL HYDROCHLORIDE 5 MG/ML
20 INJECTION, SOLUTION INTRAVENOUS ONCE AS NEEDED
Status: DISCONTINUED | OUTPATIENT
Start: 2024-02-19 | End: 2024-02-22 | Stop reason: HOSPADM

## 2024-02-19 RX ORDER — HYDRALAZINE HYDROCHLORIDE 20 MG/ML
5 INJECTION INTRAMUSCULAR; INTRAVENOUS ONCE AS NEEDED
Status: DISCONTINUED | OUTPATIENT
Start: 2024-02-19 | End: 2024-02-22 | Stop reason: HOSPADM

## 2024-02-19 RX ORDER — IBUPROFEN 600 MG/1
600 TABLET ORAL EVERY 6 HOURS
Status: DISCONTINUED | OUTPATIENT
Start: 2024-02-20 | End: 2024-02-22 | Stop reason: HOSPADM

## 2024-02-19 RX ORDER — ACETAMINOPHEN 325 MG/1
975 TABLET ORAL EVERY 6 HOURS
Status: DISCONTINUED | OUTPATIENT
Start: 2024-02-19 | End: 2024-02-22 | Stop reason: HOSPADM

## 2024-02-19 RX ORDER — ONDANSETRON 4 MG/1
4 TABLET, FILM COATED ORAL EVERY 6 HOURS PRN
Status: DISCONTINUED | OUTPATIENT
Start: 2024-02-19 | End: 2024-02-19

## 2024-02-19 RX ORDER — SIMETHICONE 80 MG
80 TABLET,CHEWABLE ORAL 4 TIMES DAILY PRN
Status: DISCONTINUED | OUTPATIENT
Start: 2024-02-19 | End: 2024-02-22 | Stop reason: HOSPADM

## 2024-02-19 RX ORDER — NALOXONE HYDROCHLORIDE 0.4 MG/ML
0.1 INJECTION, SOLUTION INTRAMUSCULAR; INTRAVENOUS; SUBCUTANEOUS EVERY 5 MIN PRN
Status: DISCONTINUED | OUTPATIENT
Start: 2024-02-19 | End: 2024-02-22 | Stop reason: HOSPADM

## 2024-02-19 RX ORDER — NIFEDIPINE 10 MG/1
10 CAPSULE ORAL ONCE AS NEEDED
Status: DISCONTINUED | OUTPATIENT
Start: 2024-02-19 | End: 2024-02-19 | Stop reason: HOSPADM

## 2024-02-19 RX ORDER — POLYETHYLENE GLYCOL 3350 17 G/17G
17 POWDER, FOR SOLUTION ORAL 2 TIMES DAILY PRN
Status: DISCONTINUED | OUTPATIENT
Start: 2024-02-19 | End: 2024-02-22 | Stop reason: HOSPADM

## 2024-02-19 RX ORDER — METOCLOPRAMIDE HYDROCHLORIDE 5 MG/ML
10 INJECTION INTRAMUSCULAR; INTRAVENOUS EVERY 6 HOURS PRN
Status: DISCONTINUED | OUTPATIENT
Start: 2024-02-19 | End: 2024-02-19

## 2024-02-19 RX ORDER — TRANEXAMIC ACID 100 MG/ML
1000 INJECTION, SOLUTION INTRAVENOUS ONCE AS NEEDED
Status: DISCONTINUED | OUTPATIENT
Start: 2024-02-19 | End: 2024-02-22 | Stop reason: HOSPADM

## 2024-02-19 RX ORDER — MISOPROSTOL 200 UG/1
800 TABLET ORAL ONCE AS NEEDED
Status: DISCONTINUED | OUTPATIENT
Start: 2024-02-19 | End: 2024-02-22 | Stop reason: HOSPADM

## 2024-02-19 RX ORDER — CEFAZOLIN SODIUM 2 G/100ML
2 INJECTION, SOLUTION INTRAVENOUS ONCE
Status: COMPLETED | OUTPATIENT
Start: 2024-02-19 | End: 2024-02-19

## 2024-02-19 RX ORDER — METHYLERGONOVINE MALEATE 0.2 MG/ML
0.2 INJECTION INTRAVENOUS ONCE AS NEEDED
Status: DISCONTINUED | OUTPATIENT
Start: 2024-02-19 | End: 2024-02-22 | Stop reason: HOSPADM

## 2024-02-19 RX ADMIN — OXYTOCIN 600 MILLI-UNITS/MIN: 10 INJECTION, SOLUTION INTRAMUSCULAR; INTRAVENOUS at 13:07

## 2024-02-19 RX ADMIN — DEXMEDETOMIDINE HYDROCHLORIDE 8 MCG: 100 INJECTION, SOLUTION INTRAVENOUS at 13:08

## 2024-02-19 RX ADMIN — MIDAZOLAM HYDROCHLORIDE 2 MG: 1 INJECTION, SOLUTION INTRAMUSCULAR; INTRAVENOUS at 13:08

## 2024-02-19 RX ADMIN — DEXMEDETOMIDINE HYDROCHLORIDE 8 MCG: 100 INJECTION, SOLUTION INTRAVENOUS at 13:06

## 2024-02-19 RX ADMIN — CEFAZOLIN 2 G: 330 INJECTION, POWDER, FOR SOLUTION INTRAMUSCULAR; INTRAVENOUS at 12:18

## 2024-02-19 RX ADMIN — MORPHINE SULFATE 3 MG: 0.5 INJECTION EPIDURAL; INTRATHECAL; INTRAVENOUS at 13:20

## 2024-02-19 RX ADMIN — LIDOCAINE HYDROCHLORIDE,EPINEPHRINE BITARTRATE 10 ML: 20; .005 INJECTION, SOLUTION EPIDURAL; INFILTRATION; INTRACAUDAL; PERINEURAL at 12:25

## 2024-02-19 RX ADMIN — FAMOTIDINE 20 MG: 10 INJECTION INTRAVENOUS at 12:14

## 2024-02-19 RX ADMIN — Medication 80 MCG: at 13:05

## 2024-02-19 RX ADMIN — FENTANYL CITRATE 100 MCG: 50 INJECTION, SOLUTION INTRAMUSCULAR; INTRAVENOUS at 13:00

## 2024-02-19 RX ADMIN — ONDANSETRON 4 MG: 2 INJECTION INTRAMUSCULAR; INTRAVENOUS at 18:21

## 2024-02-19 RX ADMIN — KETOROLAC TROMETHAMINE 30 MG: 30 INJECTION, SOLUTION INTRAMUSCULAR; INTRAVENOUS at 13:30

## 2024-02-19 RX ADMIN — Medication 120 MCG: at 12:30

## 2024-02-19 RX ADMIN — LIDOCAINE HYDROCHLORIDE 5 ML: 20 INJECTION, SOLUTION INFILTRATION; PERINEURAL at 12:57

## 2024-02-19 RX ADMIN — LEVONORGESTREL 52 MG: 52 INTRAUTERINE DEVICE INTRAUTERINE at 13:10

## 2024-02-19 RX ADMIN — KETOROLAC TROMETHAMINE 30 MG: 30 INJECTION INTRAMUSCULAR; INTRAVENOUS at 21:02

## 2024-02-19 RX ADMIN — Medication 80 MCG: at 12:45

## 2024-02-19 RX ADMIN — ACETAMINOPHEN 975 MG: 325 TABLET ORAL at 21:02

## 2024-02-19 RX ADMIN — DEXMEDETOMIDINE HYDROCHLORIDE 4 MCG: 100 INJECTION, SOLUTION INTRAVENOUS at 13:04

## 2024-02-19 RX ADMIN — TERBUTALINE SULFATE 0.25 MG: 1 INJECTION SUBCUTANEOUS at 12:54

## 2024-02-19 RX ADMIN — SODIUM CHLORIDE, SODIUM LACTATE, POTASSIUM CHLORIDE, AND CALCIUM CHLORIDE: 600; 310; 30; 20 INJECTION, SOLUTION INTRAVENOUS at 12:14

## 2024-02-19 RX ADMIN — Medication 80 MCG: at 13:33

## 2024-02-19 RX ADMIN — Medication 120 MCG: at 13:15

## 2024-02-19 RX ADMIN — ONDANSETRON 4 MG: 2 INJECTION INTRAMUSCULAR; INTRAVENOUS at 12:38

## 2024-02-19 RX ADMIN — CEFAZOLIN SODIUM 2 G: 2 INJECTION, SOLUTION INTRAVENOUS at 21:02

## 2024-02-19 RX ADMIN — DEXMEDETOMIDINE HYDROCHLORIDE 8 MCG: 100 INJECTION, SOLUTION INTRAVENOUS at 13:33

## 2024-02-19 RX ADMIN — Medication 120 MCG: at 12:21

## 2024-02-19 RX ADMIN — SODIUM CHLORIDE, POTASSIUM CHLORIDE, SODIUM LACTATE AND CALCIUM CHLORIDE 125 ML/HR: 600; 310; 30; 20 INJECTION, SOLUTION INTRAVENOUS at 10:46

## 2024-02-19 RX ADMIN — Medication 80 MCG: at 12:38

## 2024-02-19 RX ADMIN — DIPHENHYDRAMINE HYDROCHLORIDE 25 MG: 50 INJECTION INTRAMUSCULAR; INTRAVENOUS at 18:17

## 2024-02-19 RX ADMIN — KETAMINE HYDROCHLORIDE 20 MG: 10 INJECTION, SOLUTION INTRAMUSCULAR; INTRAVENOUS at 13:09

## 2024-02-19 RX ADMIN — Medication 120 MCG: at 12:53

## 2024-02-19 RX ADMIN — DEXMEDETOMIDINE HYDROCHLORIDE 8 MCG: 100 INJECTION, SOLUTION INTRAVENOUS at 13:01

## 2024-02-19 RX ADMIN — Medication 120 MCG: at 14:19

## 2024-02-19 SDOH — HEALTH STABILITY: MENTAL HEALTH: STRENGTHS (MUST CHOOSE TWO): SUPPORT FROM FAMILY;DEMONSTRATES EFFECTIVE COPING SKILLS

## 2024-02-19 SDOH — ECONOMIC STABILITY: HOUSING INSECURITY: DO YOU FEEL UNSAFE GOING BACK TO THE PLACE WHERE YOU ARE LIVING?: NO

## 2024-02-19 SDOH — SOCIAL STABILITY: SOCIAL INSECURITY: ARE YOU OR HAVE YOU BEEN THREATENED OR ABUSED PHYSICALLY, EMOTIONALLY, OR SEXUALLY BY ANYONE?: NO

## 2024-02-19 SDOH — SOCIAL STABILITY: SOCIAL INSECURITY: DOES ANYONE TRY TO KEEP YOU FROM HAVING/CONTACTING OTHER FRIENDS OR DOING THINGS OUTSIDE YOUR HOME?: NO

## 2024-02-19 SDOH — SOCIAL STABILITY: SOCIAL INSECURITY: VERBAL ABUSE: DENIES

## 2024-02-19 SDOH — SOCIAL STABILITY: SOCIAL INSECURITY: ARE THERE ANY APPARENT SIGNS OF INJURIES/BEHAVIORS THAT COULD BE RELATED TO ABUSE/NEGLECT?: NO

## 2024-02-19 SDOH — HEALTH STABILITY: MENTAL HEALTH: HAVE YOU USED ANY SUBSTANCES (CANABIS, COCAINE, HEROIN, HALLUCINOGENS, INHALANTS, ETC.) IN THE PAST 12 MONTHS?: YES

## 2024-02-19 SDOH — SOCIAL STABILITY: SOCIAL INSECURITY: PHYSICAL ABUSE: DENIES

## 2024-02-19 SDOH — HEALTH STABILITY: MENTAL HEALTH: HAVE YOU USED ANY PRESCRIPTION DRUGS OTHER THAN PRESCRIBED IN THE PAST 12 MONTHS?: NO

## 2024-02-19 SDOH — SOCIAL STABILITY: SOCIAL INSECURITY: ABUSE SCREEN: ADULT

## 2024-02-19 SDOH — SOCIAL STABILITY: SOCIAL INSECURITY: HAS ANYONE EVER THREATENED TO HURT YOUR FAMILY OR YOUR PETS?: NO

## 2024-02-19 SDOH — HEALTH STABILITY: MENTAL HEALTH: NON-SPECIFIC ACTIVE SUICIDAL THOUGHTS (PAST 1 MONTH): NO

## 2024-02-19 SDOH — HEALTH STABILITY: MENTAL HEALTH: SUICIDAL BEHAVIOR (LIFETIME): NO

## 2024-02-19 SDOH — HEALTH STABILITY: MENTAL HEALTH: CURRENT SMOKER: 1

## 2024-02-19 SDOH — HEALTH STABILITY: MENTAL HEALTH: WISH TO BE DEAD (PAST 1 MONTH): NO

## 2024-02-19 SDOH — HEALTH STABILITY: MENTAL HEALTH: WERE YOU ABLE TO COMPLETE ALL THE BEHAVIORAL HEALTH SCREENINGS?: YES

## 2024-02-19 SDOH — SOCIAL STABILITY: SOCIAL INSECURITY: DO YOU FEEL ANYONE HAS EXPLOITED OR TAKEN ADVANTAGE OF YOU FINANCIALLY OR OF YOUR PERSONAL PROPERTY?: NO

## 2024-02-19 SDOH — SOCIAL STABILITY: SOCIAL INSECURITY: HAVE YOU HAD THOUGHTS OF HARMING ANYONE ELSE?: NO

## 2024-02-19 ASSESSMENT — LIFESTYLE VARIABLES
HOW MANY STANDARD DRINKS CONTAINING ALCOHOL DO YOU HAVE ON A TYPICAL DAY: 1 OR 2
HOW OFTEN DO YOU HAVE 6 OR MORE DRINKS ON ONE OCCASION: MONTHLY
AUDIT-C TOTAL SCORE: 5
AUDIT-C TOTAL SCORE: 5
SKIP TO QUESTIONS 9-10: 0
HOW OFTEN DO YOU HAVE A DRINK CONTAINING ALCOHOL: 2-3 TIMES A WEEK

## 2024-02-19 ASSESSMENT — PAIN SCALES - GENERAL
PAINLEVEL_OUTOF10: 0 - NO PAIN
PAINLEVEL_OUTOF10: 7
PAINLEVEL_OUTOF10: 0 - NO PAIN
PAIN_LEVEL: 0
PAINLEVEL_OUTOF10: 0 - NO PAIN
PAINLEVEL_OUTOF10: 0 - NO PAIN

## 2024-02-19 ASSESSMENT — ACTIVITIES OF DAILY LIVING (ADL): LACK_OF_TRANSPORTATION: NO

## 2024-02-19 NOTE — H&P
Obstetrical Admission History and Physical     Arlen Braga is a 28 y.o.  at 38w0d. FLACO: 3/4/2024, by Last Menstrual Period.     Chief Complaint: ECV, with delivery to follow; cHTN    Assessment/Plan        Principal Problem:    Labor and delivery indication for care or intervention  Active Problems:    Asthma    Chronic hypertension    Chronic intractable headache    Breech extraction without indication      Pregnancy Problems (from 10/17/23 to present)       Problem Noted Resolved    Labor and delivery indication for care or intervention 2024 by Ida Unger MD No    Priority:  Medium      Overview Signed 2024 11:42 AM by Ida Unger MD     Admitted for ECV with IOL or  delivery to follow  Delivery in s/o cHTN, no meds  Consented and scanned - breech  GBS pos, ppx if ECV successful  ppBC: pp mirena IUD         36 weeks gestation of pregnancy 2024 by Kathryn Dotson MD No    Priority:  Medium      High-risk pregnancy in second trimester 2023 by Romero Park MD No    Priority:  Medium      Chronic hypertension 2023 by Romero Park MD No    Priority:  Medium      Overview Addendum 2024 11:42 AM by Ida Unger MD     BPs remain normal-mild range off meds  Denies symptoms  Follow-up growth @ 28 wga    Admission HELLP labs pending         Mild alcohol use disorder 2023 by JAME Levy-CNP No    Priority:  Medium      Overview Addendum 2024 12:43 PM by Kathryn Dotson MD     Reduced use from 1 pint/day pre-pregnancy to one to two 12 oz cans of MXD per day   Family engages in heavy alcohol use and she is trying to move into her own place away from them    [x] Pharmacotherapy:  - Gabapentin 300/300/300/600 since 2023  - Plan for Vivitrol and disulfiram postpartum (pt requested the latter)  [X] Behavioral Health Referrals: [facility], [facility]         Tobacco smoking affecting pregnancy in second trimester 2023 by Janet YODER  KIRILL Frazier No    Priority:  Medium      Overview Addendum 2/13/2024 12:44 PM by Kathryn Dotson MD     Cut back to 3 cigarettes per day since [facility] admission (11/17-11/22)    [x] Pharmacotherapy  - Nov 2023: Started Wellbutrin 150 mg daily (inconsistent use)  - Jan 2024: 7mg patch with 2mg lozenges prn (inconsistent use)   [x] [employer] Q[employer] [facility] Two - pt states program is unreliable         Asthma 3/22/2011 by KIRILL Levy No    Priority:  Medium      Overview Signed 11/16/2023  5:03 PM by KIRILL Levy     Never been hospitalized. Using rescue inhaler once Q night         History of pancreatitis 11/16/2023 by KIRILL Levy 11/28/2023 by Romero Park MD    Overview Addendum 11/16/2023  5:16 PM by KIRILL Levy     Hospitalized in 3/2023         Limited prenatal care, antepartum 11/16/2023 by KIRILL Levy 11/28/2023 by Romero Park MD          Options for delivery have been discussed with the patient and she elects for an induction of labor.  Cervical ripening with cytotec, cervidil, other prostaglandin agents has been discussed.  Induction of labor with pitocin, amniotomy, cytotec, and cervical balloon have been discussed in detail. The risks, benefits, complications, alternatives, expected outcomes, potential problems during recuperation and recovery, and the risks of not performing the procedure were discussed with the patient. The patient stated understanding that the risks of delivery include, but are not limited to: death; reaction to medications; injury to bowel, bladder, ureters, uterus, cervix, vagina, and other pelvic and abdominal structures, infection; blood loss and possible need for transfusion; and potential need for surgery, including hysterectomy. The risks of injury to the infant during delivery were also discussed. All questions were answered. There was concurrence with the planned procedure, and the patient wanted to proceed.    Admit to  inpatient status. I anticipate that this patient will require a stay exceeding at least 2 midnights for delivery and postpartum.  ECV with IOL or CD  to follow  Management of pregnancy complications, as indicated.    Seen and discussed with Dr. Shyann Unger MD PGY-1    Discussed risks of ECV with patient including fetal bradycardia, abruption, rupture of membranes.  Discussed risks of surgery if needed including bleeding, infection, organ injury    Patient seen and examined by me, agree with the above assessment and plan.  Emilia Boothe MD         Subjective   Good fetal movement. Denies vaginal bleeding., Denies leaking of fluid.       Reason for Induction of Labor:  Chronic hypertension         Obstetrical History   OB History    Para Term  AB Living   5 1 1   3 1   SAB IAB Ectopic Multiple Live Births   3       1      # Outcome Date GA Lbr Olegario/2nd Weight Sex Delivery Anes PTL Lv   5 Current            4 2018           3 Term 2016   2.948 kg F Vag-Spont   OSITO   2 2014           1 2012               Past Medical History  Past Medical History:   Diagnosis Date    Anemia     Depression         Past Surgical History   Past Surgical History:   Procedure Laterality Date    WISDOM TOOTH EXTRACTION Bilateral     pt unsure when       Social History  Social History     Tobacco Use    Smoking status: Every Day     Packs/day: 1     Types: Cigarettes    Smokeless tobacco: Never    Tobacco comments:     Pt states she is currently in a program to quit. Does not want smoking cessation information at this time   Substance Use Topics    Alcohol use: Yes     Alcohol/week: 4.0 standard drinks of alcohol     Types: 4 Standard drinks or equivalent per week     Comment: adair sun     Substance and Sexual Activity   Drug Use Yes    Types: Marijuana       Allergies  Patient has no known allergies.     Medications  Medications Prior to Admission   Medication Sig Dispense Refill Last  Dose    albuterol 0.63 mg/3 mL nebulizer solution Take 3 mL (0.63 mg) by nebulization every 6 hours if needed for wheezing or shortness of breath.   Past Week    buPROPion XL (Wellbutrin XL) 150 mg 24 hr tablet Take 1 tablet (150 mg) by mouth once daily. Do not crush, chew, or split. 30 tablet 11 Past Week    Daily-Steffany tablet TAKE 1 TABLET BY MOUTH ONCE DAILY. DO NOT START BEFORE NOVEMBER 23, 2023. 60 tablet 0 2/19/2024    gabapentin (Neurontin) 300 mg capsule Take 1 capsule (300 mg) by mouth 3 times a day AND 2 capsules (600 mg) once daily at bedtime. 150 capsule 1     nicotine (Nicoderm CQ) 7 mg/24 hr patch Place 1 patch over 24 hours on the skin once every 24 hours. 30 patch 0 Past Week    nicotine polacrilex (Nicorette) 2 mg gum Chew 1 each (2 mg) if needed for smoking cessation. (Patient not taking: Reported on 2/19/2024) 100 each 3 More than a month    pantoprazole (ProtoNix) 20 mg EC tablet Take 1 tablet (20 mg) by mouth once daily in the morning. Take before meals. Do not crush, chew, or split. 60 tablet 0 Past Week       Objective    Last Vitals  Temp Pulse Resp BP MAP O2 Sat   36.2 °C (97.2 °F) 90 18 125/88   99 %     Physical Examination  Fetal Assessment  Movement: Present  Mode: External US  Baseline Fetal Heart Rate (bpm): 150 bpm  Baseline Classification: Normal  Variability: Moderate (Between 6 and 25 BPM)  Pattern: Accelerations      Contraction Frequency: irregular      SVE deferred  BSUS: breech    Lab Review  Labs in chart were reviewed.  Admission labs pending

## 2024-02-19 NOTE — ANESTHESIA PROCEDURE NOTES
Epidural Block    Start time: 2/19/2024 12:09 PM  Reason for block: at surgeon's request  Staffing  Performed: TREVOR   Authorized by: TREVOR Pascual    Performed by: TREVOR Pascual    Preanesthetic Checklist  Completed: patient identified, IV checked, risks and benefits discussed, surgical consent, pre-op evaluation, timeout performed and sterile techniques followed  Block Timeout  RN/Licensed healthcare professional reads aloud to the Anesthesia provider and entire team: Patient identity, procedure with side and site, patient position, and as applicable the availability of implants/special equipment/special requirements.  Patient on coagulant treatment: no  Timeout performed at: 2/19/2024 12:09 PM  Block Placement  Patient position: sitting  Prep: ChloraPrep  Sterility prep: cap, drape, gloves and mask  Sedation level: no sedation  Patient monitoring: blood pressure, continuous pulse oximetry and heart rate  Approach: midline  Local numbing: lidocaine 1% to skin and subcutaneous tissues  Vertebral space: lumbar  Lumbar location: L3-L4  Epidural  Loss of resistance technique: saline  Guidance: landmark technique        Needle  Needle type: Tuohy   Needle gauge: 17  Needle length: 10.2 cm  Needle insertion depth: 6 cm  Catheter type: multi-orifice  Catheter size: 19 G  Catheter at skin depth: 10 cm  Catheter securement method: clear occlusive dressing    Test dose: lidocaine 1.5% with epinephrine 1-to-200,000  Test dose: lidocaine 1.5% with epinephrine 1-to-200,000  Test dose result: no positive test dose            Assessment  Block outcome: patient comfortable  Number of attempts: 1  Events: no positive test dose  Procedure assessment: patient tolerated procedure well with no immediate complications

## 2024-02-19 NOTE — ANESTHESIA PREPROCEDURE EVALUATION
Patient: Arlen Braga    Evaluation Method: In-person visit    Procedure Information       Date/Time: 02/19/24 1130    Procedure: Version External Cephalic    Location: [facility] 04 / Virtual [facility] 2 OB    Surgeons: Emilia GAMING MD            Relevant Problems   Anesthesia (within normal limits)  [place] teeth extraction w/ GA      Cardiovascular   (+) Chronic hypertension      Endocrine (within normal limits)      GI   (+) Alcoholic pancreatitis (Twice, resolved)   (+) Gastroesophageal reflux disease without esophagitis      /Renal (within normal limits)      Neuro/Psych   (+) Anxiety and depression   (+) Chronic intractable headache      Pulmonary   (+) Asthma (Uses albuterol daily)      GI/Hepatic (within normal limits)      Hematology (within normal limits)      Musculoskeletal (within normal limits)      Eyes, Ears, Nose, and Throat (within normal limits)      Infectious Disease (within normal limits)       Clinical information reviewed:    Allergies  Meds             1/2 PPD cigarettes, 2 glasses of wine  2-3 times a week, last MJ on 2/14  NPO Detail: 11:30pm 2/18  No data recorded     OB/Gyn Evaluation    Present Pregnancy    Patient is pregnant now.   Obstetric History                Physical Exam    Airway  Mallampati: II  TM distance: >3 FB  Neck ROM: full     Cardiovascular    Dental - normal exam  Comments: Tongue ring to be removed prior to version   Pulmonary   Breath sounds clear to auscultation     Abdominal            Anesthesia Plan    History of general anesthesia?: yes  History of complications of general anesthesia?: no    ASA 2     epidural     The patient is a current smoker.    Anesthetic plan and risks discussed with patient.  Use of blood products discussed with patient who.       Vitals:    02/19/24 1045   BP:    Pulse: 90   Resp:    Temp:    SpO2: 99%

## 2024-02-19 NOTE — L&D DELIVERY NOTE
Op Note    G/P:   Gestational Age:  38w0d    Pre-operative diagnosis:    Breech presentation aborted external cephalic version due to fetal bradycardia  Chronic hypertension    Post-operative diagnosis: Same      Procedure: urgent primary low transverse  section , external cephalic version attempt prior to , postplacental IUD placement    Surgeons      Emilia Boothe MD - Primary    Resident/Fellow/Other Assistant:  Surgeon(s) and Role:  Ida Unger MD PGY-1    Procedure Summary  Anesthesia: Epidural ASA: III   Anesthesia Staff: Anesthesiologist: Pierre Guillory MD  C-AA: TREVOR Pascual; TREVOR Mayorga     Operative Findings: Normal appearing gravid uterus.  Grossly normal appearing fallopian tubes and ovaries.   female infant, amniotic fluid clear, infant in complete breech presentation    Complications:  None; patient tolerated the procedure well.    Specimens Collected: placenta    QBL: 500 mL            Indications for procedure:  Patient was consented for ECV.  Attempt at forward roll was made which was unsuccessful.  FHR auscultated for about 1 min 3 minutes after attempt and FHR was in the 60 BPM.  Patient was counseled for urgent delivery.       Procedure: Pt was taken to the OR where Epidural anesthesia was administered.  She was then placed in the dorsal supine position with a left lateral tilt. A aguilar catheter was placed, SCD’s were applied, and a vaginal prep was performed. A pre-procedure time out was performed.  ECV was attempted after one dose subcutaneously terbutaline.  With one forward roll attempt the baby remained complete breech.  FHR then dropped to 60 BPM without recovery.  The pt was given prophylactic dose of IV antibiotics.  She was then prepped and draped in the usual sterile fashion. A Pfannenstiel skin incision was made with the scalpel through the skin and subcutaneous fat to the underlying fascial layer.  The fascia was incised in  the midline with the scalpel and the incision was extended bilaterally. The superior aspect of the incision was grasped, tented up with Kocher clamps and the rectus muscle was dissected off. The muscles were divided in the midline, the peritoneum was then identified and entered bluntly and incision extended superiorly and inferiorly taking care to avoid underlying viscera.  The bladder blade was inserted.           A low transverse uterine incision was made with the scalpel, the uterine cavity was entered, and the hysterotomy was extended cephalocaudally by stretching.  The infant was delivered atraumatically via breech extraction,  the cord was clamped and cut and infant was handed off to awaiting nursing.  A cord segment and blood sample were collected.  The placenta was then expressed.  The uterus was exteriorized and cleared of all clot and debris. The uterine incision was repaired using a running locked stitch of 0-vicryl in two layers.  A liletta IUD was placed with ring forceps at the fundus during the closure and the strings were cut to 10 cm.  A running baseball stitch was also placed using 3-0 monocryl.  Good hemostasis was noted.         The uterus was the placed back inside the pelvis, the gutters were cleared of all clots and debris.  The hysterotomy was again evaluated and found to be hemostatic, the underside of the fascia and bladder and the rectus muscles were also found to be hemostatic.  The fascia was closed using a running stitch of 0-PDS.  The subcutaneous layer was irrigated, small bleeders were cauterized, and the subcutaneous layer was reapproximated using a running stitch of 3-0 monocryl.  The skin was closed with 4-0  monocryl.  All counts were correct, the patient tolerated the procedure well.  Dr. Boothe was present for the entire procedure.  The patient and infant were taken back to the recovery room in stable condition.    Emilia Boothe MD

## 2024-02-19 NOTE — ANESTHESIA POSTPROCEDURE EVALUATION
Patient: Arlen Braga    Procedure Summary       Date: 02/19/24 Room / Location: [facility] OB 04 / Virtual [facility] 2 OB    Anesthesia Start: 1203 Anesthesia Stop: 1426    Procedure: Version External Cephalic Diagnosis:       Breech extraction without indication, single or unspecified fetus      (Breech extraction without indication, single or unspecified fetus [O32.1XX0]  @38 wks [place]  [place] pt.)    Surgeons: Emilia GAMING MD Responsible Provider: Pierre Guillory MD    Anesthesia Type: epidural ASA Status: 3            Anesthesia Type: epidural    Vitals Value Taken Time   /85 02/19/24 1425   Temp 36.1 02/19/24 1430   Pulse 81 02/19/24 1428   Resp 18 02/19/24 1430   SpO2 97 % 02/19/24 1428       Anesthesia Post Evaluation    Patient location during evaluation: bedside  Patient participation: complete - patient participated  Level of consciousness: awake and alert  Pain score: 0  Pain management: adequate  Airway patency: patent  Cardiovascular status: acceptable  Respiratory status: acceptable  Hydration status: acceptable  Postoperative Nausea and Vomiting: none        No notable events documented.

## 2024-02-20 ENCOUNTER — TELEPHONE (OUTPATIENT)
Dept: OBSTETRICS AND GYNECOLOGY | Facility: CLINIC | Age: 29
End: 2024-02-20
Payer: COMMERCIAL

## 2024-02-20 ENCOUNTER — ANESTHESIA EVENT (OUTPATIENT)
Dept: POSTPARTUM | Facility: HOSPITAL | Age: 29
End: 2024-02-20
Payer: COMMERCIAL

## 2024-02-20 PROBLEM — G89.29 CHRONIC INTRACTABLE HEADACHE: Status: RESOLVED | Noted: 2024-02-19 | Resolved: 2024-02-20

## 2024-02-20 PROBLEM — Z3A.36 36 WEEKS GESTATION OF PREGNANCY (HHS-HCC): Status: RESOLVED | Noted: 2024-02-06 | Resolved: 2024-02-20

## 2024-02-20 PROBLEM — O09.92 HIGH-RISK PREGNANCY IN SECOND TRIMESTER (HHS-HCC): Status: RESOLVED | Noted: 2023-11-28 | Resolved: 2024-02-20

## 2024-02-20 PROBLEM — R51.9 CHRONIC INTRACTABLE HEADACHE: Status: RESOLVED | Noted: 2024-02-19 | Resolved: 2024-02-20

## 2024-02-20 LAB
ALBUMIN SERPL BCP-MCNC: 2.5 G/DL (ref 3.4–5)
ALP SERPL-CCNC: 74 U/L (ref 33–110)
ALT SERPL W P-5'-P-CCNC: 9 U/L (ref 7–45)
AMPHETAMINES UR QL SCN: ABNORMAL
ANION GAP SERPL CALC-SCNC: 10 MMOL/L (ref 10–20)
AST SERPL W P-5'-P-CCNC: 18 U/L (ref 9–39)
BARBITURATES UR QL SCN: ABNORMAL
BENZODIAZ UR QL SCN: ABNORMAL
BILIRUB SERPL-MCNC: 0.3 MG/DL (ref 0–1.2)
BUN SERPL-MCNC: 3 MG/DL (ref 6–23)
BZE UR QL SCN: ABNORMAL
C TRACH RRNA SPEC QL NAA+PROBE: NEGATIVE
CALCIUM SERPL-MCNC: 8.3 MG/DL (ref 8.6–10.6)
CANNABINOIDS UR QL SCN: ABNORMAL
CHLORIDE SERPL-SCNC: 106 MMOL/L (ref 98–107)
CO2 SERPL-SCNC: 24 MMOL/L (ref 21–32)
CREAT SERPL-MCNC: 0.49 MG/DL (ref 0.5–1.05)
EGFRCR SERPLBLD CKD-EPI 2021: >90 ML/MIN/1.73M*2
ERYTHROCYTE [DISTWIDTH] IN BLOOD BY AUTOMATED COUNT: 12.6 % (ref 11.5–14.5)
FENTANYL+NORFENTANYL UR QL SCN: ABNORMAL
GLUCOSE SERPL-MCNC: 50 MG/DL (ref 74–99)
HCT VFR BLD AUTO: 28.1 % (ref 36–46)
HGB BLD-MCNC: 8.8 G/DL (ref 12–16)
MCH RBC QN AUTO: 29.6 PG (ref 26–34)
MCHC RBC AUTO-ENTMCNC: 31.3 G/DL (ref 32–36)
MCV RBC AUTO: 95 FL (ref 80–100)
N GONORRHOEA DNA SPEC QL PROBE+SIG AMP: NEGATIVE
NRBC BLD-RTO: 0 /100 WBCS (ref 0–0)
OPIATES UR QL SCN: ABNORMAL
OXYCODONE+OXYMORPHONE UR QL SCN: ABNORMAL
PCP UR QL SCN: ABNORMAL
PLATELET # BLD AUTO: 159 X10*3/UL (ref 150–450)
POTASSIUM SERPL-SCNC: 4 MMOL/L (ref 3.5–5.3)
PROT SERPL-MCNC: 4.7 G/DL (ref 6.4–8.2)
RBC # BLD AUTO: 2.97 X10*6/UL (ref 4–5.2)
SODIUM SERPL-SCNC: 136 MMOL/L (ref 136–145)
WBC # BLD AUTO: 8.8 X10*3/UL (ref 4.4–11.3)

## 2024-02-20 PROCEDURE — 85027 COMPLETE CBC AUTOMATED: CPT

## 2024-02-20 PROCEDURE — 84075 ASSAY ALKALINE PHOSPHATASE: CPT

## 2024-02-20 PROCEDURE — 2500000004 HC RX 250 GENERAL PHARMACY W/ HCPCS (ALT 636 FOR OP/ED): Performed by: NURSE PRACTITIONER

## 2024-02-20 PROCEDURE — 2500000001 HC RX 250 WO HCPCS SELF ADMINISTERED DRUGS (ALT 637 FOR MEDICARE OP)

## 2024-02-20 PROCEDURE — 80307 DRUG TEST PRSMV CHEM ANLYZR: CPT | Performed by: NURSE PRACTITIONER

## 2024-02-20 PROCEDURE — 36415 COLL VENOUS BLD VENIPUNCTURE: CPT

## 2024-02-20 PROCEDURE — 99231 SBSQ HOSP IP/OBS SF/LOW 25: CPT | Performed by: NURSE PRACTITIONER

## 2024-02-20 PROCEDURE — S4991 NICOTINE PATCH NONLEGEND: HCPCS

## 2024-02-20 PROCEDURE — 2500000002 HC RX 250 W HCPCS SELF ADMINISTERED DRUGS (ALT 637 FOR MEDICARE OP, ALT 636 FOR OP/ED)

## 2024-02-20 PROCEDURE — 2500000002 HC RX 250 W HCPCS SELF ADMINISTERED DRUGS (ALT 637 FOR MEDICARE OP, ALT 636 FOR OP/ED): Performed by: NURSE PRACTITIONER

## 2024-02-20 PROCEDURE — 1210000001 HC SEMI-PRIVATE ROOM DAILY

## 2024-02-20 PROCEDURE — 2500000004 HC RX 250 GENERAL PHARMACY W/ HCPCS (ALT 636 FOR OP/ED)

## 2024-02-20 PROCEDURE — 2500000005 HC RX 250 GENERAL PHARMACY W/O HCPCS

## 2024-02-20 RX ORDER — NIFEDIPINE 30 MG/1
30 TABLET, FILM COATED, EXTENDED RELEASE ORAL
Status: DISCONTINUED | OUTPATIENT
Start: 2024-02-20 | End: 2024-02-21

## 2024-02-20 RX ORDER — LABETALOL 100 MG/1
200 TABLET, FILM COATED ORAL EVERY 8 HOURS
Status: DISCONTINUED | OUTPATIENT
Start: 2024-02-20 | End: 2024-02-20

## 2024-02-20 RX ADMIN — PANTOPRAZOLE SODIUM 20 MG: 20 TABLET, DELAYED RELEASE ORAL at 07:10

## 2024-02-20 RX ADMIN — KETOROLAC TROMETHAMINE 30 MG: 30 INJECTION INTRAMUSCULAR; INTRAVENOUS at 08:18

## 2024-02-20 RX ADMIN — ENOXAPARIN SODIUM 40 MG: 100 INJECTION SUBCUTANEOUS at 02:15

## 2024-02-20 RX ADMIN — ACETAMINOPHEN 975 MG: 325 TABLET ORAL at 08:17

## 2024-02-20 RX ADMIN — POLYETHYLENE GLYCOL 3350 17 G: 17 POWDER, FOR SOLUTION ORAL at 08:18

## 2024-02-20 RX ADMIN — IBUPROFEN 600 MG: 600 TABLET, FILM COATED ORAL at 20:40

## 2024-02-20 RX ADMIN — ACETAMINOPHEN 975 MG: 325 TABLET ORAL at 02:15

## 2024-02-20 RX ADMIN — LIDOCAINE 1 PATCH: 4 PATCH TOPICAL at 18:05

## 2024-02-20 RX ADMIN — IBUPROFEN 600 MG: 600 TABLET, FILM COATED ORAL at 14:18

## 2024-02-20 RX ADMIN — IRON SUCROSE 300 MG: 20 INJECTION, SOLUTION INTRAVENOUS at 17:52

## 2024-02-20 RX ADMIN — NIFEDIPINE 30 MG: 30 TABLET, FILM COATED, EXTENDED RELEASE ORAL at 11:20

## 2024-02-20 RX ADMIN — BUPROPION HYDROCHLORIDE 150 MG: 150 TABLET, FILM COATED, EXTENDED RELEASE ORAL at 08:18

## 2024-02-20 RX ADMIN — POLYETHYLENE GLYCOL 3350 17 G: 17 POWDER, FOR SOLUTION ORAL at 20:43

## 2024-02-20 RX ADMIN — KETOROLAC TROMETHAMINE 30 MG: 30 INJECTION INTRAMUSCULAR; INTRAVENOUS at 02:14

## 2024-02-20 RX ADMIN — ACETAMINOPHEN 975 MG: 325 TABLET ORAL at 20:40

## 2024-02-20 RX ADMIN — OXYCODONE HYDROCHLORIDE 10 MG: 5 TABLET ORAL at 20:40

## 2024-02-20 RX ADMIN — ACETAMINOPHEN 975 MG: 325 TABLET ORAL at 14:18

## 2024-02-20 RX ADMIN — MAGNESIUM HYDROXIDE 10 ML: 400 SUSPENSION ORAL at 18:04

## 2024-02-20 RX ADMIN — NICOTINE 7 MG/24 HR DAILY TRANSDERMAL PATCH 1 PATCH: at 10:17

## 2024-02-20 RX ADMIN — SIMETHICONE 80 MG: 80 TABLET, CHEWABLE ORAL at 18:04

## 2024-02-20 ASSESSMENT — PAIN SCALES - GENERAL
PAINLEVEL_OUTOF10: 6
PAINLEVEL_OUTOF10: 6
PAINLEVEL_OUTOF10: 9

## 2024-02-20 ASSESSMENT — PAIN DESCRIPTION - DESCRIPTORS
DESCRIPTORS: ACHING;SORE
DESCRIPTORS: ACHING;SORE

## 2024-02-20 NOTE — TELEPHONE ENCOUNTER
SW called patient to congratulate her on giving birth to her baby girl Nory yesterday, to assess the family's needs and to get patient and Nory scheduled in [facility] and [facility]. Patient stated that she is doing ok and reported that her baby is doing well. VERONIQUE provided dates/times for scheduled appointments and encouraged patient to contact VERONIQUE/JOJO if she have any questions or needs before scheduled appointments.

## 2024-02-20 NOTE — CARE PLAN
Patient is progressing through goals. Lochia is light, pain is well controlled, and vital signs are stable.

## 2024-02-20 NOTE — LACTATION NOTE
Lactation Consultant Note  Lactation Consultation  Reason for Consult: Initial assessment  Consultant Name: Elly Callahan RN, IBCLC    Maternal Information  Has mother  before?: Yes  How long did the mother previously breastfeed?: with first child who is now 7 years old  Infant to breast within first 2 hours of birth?: Yes  Exclusive Pump and Bottle Feed: No    Maternal Assessment  Breast Assessment: Medium, Soft, Warm, Compressible (expressible bilaterally)  Nipple Assessment: Intact, Erect with stimulation  [place] Assessment: Normal    Infant Assessment  Infant Behavior: Sleepy  Infant Assessment:  (wide spacing noted in upper labial frenum)    Feeding Assessment  Nutrition Source: Breastmilk, Formula (per mother’s request)  Feeding Method: Nursing at the breast  Feeding Position: Skin to skin, Mother needs assistance with latch/positioning, Breast sandwich, Laid back, Football/seated, Cradle, Cross - cradle  Suck/Feeding: Tactile stimulation needed, Audible swallowing with stimulaton, Non-nutritional sucking  Latch Assessment: Reluctant, Moderate assistance is needed, Instructed on deep latch, Shallow latch, Deep latch obtained, Mouth not open wide enough, Optimal angle of mouth opening, Lower lip turned in, Upper lip turned in, Flanged lips, Latch is painful, Comfortable latch    LATCH TOOL  Latch: Repeated attempts, hold nipple in mouth, stimulate to suck  Audible Swallowing: A few with stimulation  Type of Nipple: Everted (After stimulation)  Comfort (Breast/Nipple): Soft/non-tender  Hold (Positioning): Minimal assist, teach one side, mother does other, staff holds  LATCH Score: 7    Breast Pump       Other OB Lactation Tools       Patient Follow-up  Inpatient Lactation Follow-up Needed : Yes    Other OB Lactation Documentation  Maternal Risk Factors: Hypertension,  delivery  Infant Risk Factors: Early term birth 37-39 weeks    Recommendations/Summary  Asked by bedside RN to assess  latch as she just helped latch infant to left breast. Upon entering the room, infant sleepy, unlatching from left breast with infant in cradle hold. Infant able to re-latch shallow with lips turned in and non-nutritive sucking noted. I suggested to mother to move infant to football hold with pillow support. Mother agreeable however in this position, infant still sleepy, difficulty getting infant awake enough to latch deeply with nutritive sucks. Mother also states this position was hurting her back a bit. I suggested moving mother back in bed to a laid back position/cradle hold. Discussed with mother proper infant alignment in this position and after several attempts, infant would wake a bit with tactile stimulation/cool wash cloth but not for long and then would unlatch. Mother states last feed was at about 1300 and was similar to this feed with infant sleepy, difficulty waking infant to feed (for long). I suggested initiating pumping since infant over 24 hours old and infant sleepy with last two feeds. Mother agreeable.     Upon returning with pump parts, mother had gotten infant latched in cradle hold/laid back position with stimulation with cool wash cloth. I saw nutritive sucking and heard swallows with stimulation. Mother states latch is a bit painful, mother and I manually flanged upper and lower lips and then infant was able to attain a deeper latch with mouth at optimal angle opening. Mother states latch is now comfortable. Moving forward, I told mother I believe infant will start to wake more now that she is over 24 hours and getting further from delivery. Mother wanting to hold off on pumping now and I agreed. Mother states she plans to exclusively feed infant at breast but plans to have some formula at home in case or we discussed option to pump and store breastmilk as needed.  given to mother to discuss milk storage and also information on pumping frequency and proper cleaning of pump parts. We  discussed that if she would like to pump after latching or attempting to latch infant while she is in the hospital to let us know and we will educate her on use with parts and hospital grade pump. Mother states she does not have a breast pump for home use. I will order a breast pump for mother per her request. Outpatient lactation resources discussed with mother. I encouraged mother to call for any questions, concerns or assistance.

## 2024-02-20 NOTE — PROGRESS NOTES
Social Work Progress Note     Patient Name: Arlen Braga   Nelson Name: Nory  Nelson : 24    Assessment: SW met with Ms. Braga at bedside to reintroduce self, and SW role.  VERONIQUE is familiar with mother of baby from previous admission.  Ms. Braga is pleasant, calm, and cooperative. FOB (Valeriano) was present this visit, but agreed to step out during assessment.  Ms. Braga is currently staying with her mother at [address] [place] [address].  No safety concerns reported.  Current phone number is [phone].  Other child Yelitza is currently being cared for by her parent during this admission.  Ms. Braga has supplies needed to care for  including car seat, and safe sleep location.  She verbalized understanding of the ABC's of safe sleep.  She appears to be bonding well with .  She has a  through [employer].      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  She receives SNAP benefits, and plans to apply for [employer].  Mother of baby reported she completed a [employer] application a few months ago.      She endorses history of depression, anxiety, and alcohol use disorder.  She received counseling in the past through [facility] [facility].  Verbal consent given on this date for SW to complete a referral to   IOP.  Ms. Braga is currently on Wellbutrin.  Dr. Dotson, and JOJO PIPER's are following due to alcohol abuse.  Appointment scheduled on 3/5/24 in [facility].  Appointment scheduled on 24 with [facility] Peds Team.  She stated she spoke with Dr. Dotson about starting on Vivitrol (Naltrexone) and disulfiram.  She's already linked to [employer] [employer] [facility] [employer] smoking cessation program.  She appears to have insight into the negative consequences of excessive alcohol abuse.  She was able to identify triggers such as life stressors, feeling judged by FOB, and environment.      UDS on 24- Positive for MJ, Fentanyl(given in Epidural), and Benzos (midazolam given this admission)  Last MJ  use on 24, per medical record  According to Ms. Braga, she stopped drinking alcohol since last admission in November, but medical record indicates continued use.      SW informed Ms. Braga, and DEON Sotelo [phone], DCFS referral was completed on this date due to history of alcoholic pancreatitis during pregnancy, and positive UDS (RASHIDA) this admission.     left for [facility] staff on 24 at 16:11 by this SW     Plan: Ms. Braga MRN: [id number] is clear from SW perspective.  But, please do not discharge  girl Nory MRN: [id number] until appropriate discharge plan has been established.  SW will continue to follow, and assist with discharge planning as needed.  Please re-consult SW if additional concerns arise     DCFS Intake#[id number]    Signature: Ruby MENSAH

## 2024-02-20 NOTE — PROGRESS NOTES
Postpartum Progress Note    Assessment/Plan     Arlen Braga is a 28 y.o., , who initially presented for ECV, failed, LTCS.  She had a , Low Transverse  delivery on 2024  at 38w0d and is now POD1.    #cHTN  - started on nifed 30  - asx  - Baseline HELLP labs neg this AM    #Maternal well-being  - tobacco cessation: nicotine patch and wellbutrin  - SW onboard for connection to resources for sobriety  - hx alcoholic pancreatitis  - MJ use, accepts utox, denies other illicit drugs. Last use     #Post-op , Low Transverse   - continue routine postoperative care  - pain well controlled on ERAS protocol  - DVT Score: 5 SCDs and enoxaparin prophylactic dose daily while inpatient  - The patient's blood type is B POS. Rhogam is not indicated.    #Contraception  Post-placental IUD    #Maternal Well-Being  - emotional support provided  - bonding with infant and FOB @ bedside  - [place] feeding: breastfeeding/pumping encouraged; lactation consult prn     #Dispo  - Anticipate DC PPD#3 pending BP control.  - The signs and symptoms of PEC were reviewed with the patient, including unrelenting headache, vision changes/blurred vision, and RUQ pain  - BP cuff for home for checking BP twice a day  - Pt instructed to call primary OB if SBP > 160 or DBP > 110 or if development of PEC symptoms   - On discharge, follow up with primary OB in:       - 2-5 days for BP check       - 2 weeks for incision check       - 4-6 week for post-partum visit     Principal Problem:    Labor and delivery indication for care or intervention  Active Problems:    Asthma    Chronic hypertension    Chronic intractable headache    Breech extraction without indication      Subjective   Her pain is well controlled with current medications  She is passing flatus  She is ambulating independently  She is tolerating a Adult diet Regular  She is voiding spontaneously  She reports no breast or nursing problems  She denies emotional  concerns today     Pt endorsing itching. Nausea is resolved. Denies HA, N/V, RUQ pain, vision changes. Feels milk coming in. Motivated to quit drinking.    Objective   Last Vitals:  Temp Pulse Resp BP MAP Pulse Ox   37.3 °C (99.1 °F) 59 16 (!) 142/98   98 %     Vitals Min/Max Last 24 Hours:  Temp  Min: 36.1 °C (97 °F)  Max: 37.3 °C (99.1 °F)  Pulse  Min: 59  Max: 95  Resp  Min: 13  Max: 20  BP  Min: 118/84  Max: 151/92    Intake/Output:     Intake/Output Summary (Last 24 hours) at 2/20/2024 1051  Last data filed at 2/20/2024 0800  Gross per 24 hour   Intake 1930 ml   Output 1374 ml   Net 556 ml       Physical Exam:  General: well appearing, well nourished, postpartum  Obstetric: fundus firm below umbilicus, lochia light  Skin: Warm, dry; no rashes/lesions/erythema; mepilex silver CDI; Pfannenstiel incision: clean, dry, well approximated, open to air, negative discharge/warmth/erythema   Breast: No masses, nipple discharge  Neuro: A/Ox3, no gross motor deficit   GI: mild distension, appropriately tender, soft, +BS  Respiratory: Even and unlabored on RA, LSCTA BL  Cardiovascular: No BLE edema; No erythema, warmth  Psych: appropriate mood and affect, pleasant    Lab Data:  Lab Results   Component Value Date    WBC 8.8 02/20/2024    HGB 8.8 (L) 02/20/2024    HCT 28.1 (L) 02/20/2024     02/20/2024

## 2024-02-20 NOTE — ANESTHESIA POSTPROCEDURE EVALUATION
Patient: Arlen Braga    Procedure Summary       Date: 24 Room / Location: MAC OB 04 / Virtual MAC 2 OB    Anesthesia Start: 1203 Anesthesia Stop: 1426    Procedure: Version External Cephalic Diagnosis:       Breech extraction without indication, single or unspecified fetus      (Breech extraction without indication, single or unspecified fetus [O32.1XX0]  @38 wks [facility]  [place] pt.)    Surgeons: Emilia GAMING MD Responsible Provider: Pierre Guillory MD    Anesthesia Type: epidural ASA Status: 2            Anesthesia Type: epidural        Anesthesia Post Evaluation    Patient location during evaluation: floor  Patient participation: complete - patient participated  Level of consciousness: awake and alert  Pain management: adequate  Airway patency: patent  Cardiovascular status: acceptable and hemodynamically stable  Respiratory status: acceptable and room air  Hydration status: acceptable  Postoperative Nausea and Vomiting: none  Comments: Arlen Braga is a 28 y.o., , who had a , Low Transverse delivery on 2024 at 38w0d and is now POD1.    She had Neuraxial Anesthesia without immediate complications noted.       Pain well controlled    ---------------------------               24                      0335         ---------------------------   BP:          127/72         Pulse:         62           Resp:          16           Temp:   36.6 °C (97.9 °F)   SpO2:          98%         ---------------------------    Neuraxial site assessed. No visible redness or swelling or drainage. Patient able to ambulate and move all extremities without difficulty. Able to void. No complaints of nausea/vomiting. Tolerating PO intake well. No s/sx of PDPH.     Anesthesia will sign off     JAME Post-CRNA          Encounter Notable Events   Notable Event Outcome Phase Comment   Insufficient regional analgesia requiring intervention Resolved in Room  Intraprocedure Ketamine, midazolam, precedex given to help with  pain in spite of T4 level epidural

## 2024-02-21 ENCOUNTER — TELEPHONE (OUTPATIENT)
Dept: PEDIATRICS | Facility: CLINIC | Age: 29
End: 2024-02-21
Payer: COMMERCIAL

## 2024-02-21 PROCEDURE — 1210000001 HC SEMI-PRIVATE ROOM DAILY

## 2024-02-21 PROCEDURE — 2500000004 HC RX 250 GENERAL PHARMACY W/ HCPCS (ALT 636 FOR OP/ED)

## 2024-02-21 PROCEDURE — 2500000002 HC RX 250 W HCPCS SELF ADMINISTERED DRUGS (ALT 637 FOR MEDICARE OP, ALT 636 FOR OP/ED): Performed by: NURSE PRACTITIONER

## 2024-02-21 PROCEDURE — 99231 SBSQ HOSP IP/OBS SF/LOW 25: CPT | Performed by: NURSE PRACTITIONER

## 2024-02-21 PROCEDURE — 2500000001 HC RX 250 WO HCPCS SELF ADMINISTERED DRUGS (ALT 637 FOR MEDICARE OP)

## 2024-02-21 PROCEDURE — 2500000002 HC RX 250 W HCPCS SELF ADMINISTERED DRUGS (ALT 637 FOR MEDICARE OP, ALT 636 FOR OP/ED)

## 2024-02-21 PROCEDURE — S4991 NICOTINE PATCH NONLEGEND: HCPCS

## 2024-02-21 RX ORDER — FERROUS SULFATE 325(65) MG
65 TABLET ORAL
Status: DISCONTINUED | OUTPATIENT
Start: 2024-02-22 | End: 2024-02-22 | Stop reason: HOSPADM

## 2024-02-21 RX ORDER — NIFEDIPINE 30 MG/1
30 TABLET, FILM COATED, EXTENDED RELEASE ORAL EVERY 12 HOURS
Status: DISCONTINUED | OUTPATIENT
Start: 2024-02-21 | End: 2024-02-22 | Stop reason: HOSPADM

## 2024-02-21 RX ADMIN — OXYCODONE HYDROCHLORIDE 5 MG: 5 TABLET ORAL at 12:14

## 2024-02-21 RX ADMIN — ACETAMINOPHEN 975 MG: 325 TABLET ORAL at 14:43

## 2024-02-21 RX ADMIN — IBUPROFEN 600 MG: 600 TABLET, FILM COATED ORAL at 14:43

## 2024-02-21 RX ADMIN — NICOTINE 7 MG/24 HR DAILY TRANSDERMAL PATCH 1 PATCH: at 14:43

## 2024-02-21 RX ADMIN — IBUPROFEN 600 MG: 600 TABLET, FILM COATED ORAL at 20:32

## 2024-02-21 RX ADMIN — SIMETHICONE 80 MG: 80 TABLET, CHEWABLE ORAL at 03:35

## 2024-02-21 RX ADMIN — ACETAMINOPHEN 975 MG: 325 TABLET ORAL at 03:35

## 2024-02-21 RX ADMIN — NIFEDIPINE 30 MG: 30 TABLET, FILM COATED, EXTENDED RELEASE ORAL at 06:59

## 2024-02-21 RX ADMIN — OXYCODONE HYDROCHLORIDE 5 MG: 5 TABLET ORAL at 20:31

## 2024-02-21 RX ADMIN — ENOXAPARIN SODIUM 40 MG: 100 INJECTION SUBCUTANEOUS at 03:35

## 2024-02-21 RX ADMIN — NIFEDIPINE 30 MG: 30 TABLET, FILM COATED, EXTENDED RELEASE ORAL at 18:38

## 2024-02-21 RX ADMIN — PANTOPRAZOLE SODIUM 20 MG: 20 TABLET, DELAYED RELEASE ORAL at 07:00

## 2024-02-21 RX ADMIN — IBUPROFEN 600 MG: 600 TABLET, FILM COATED ORAL at 08:21

## 2024-02-21 RX ADMIN — ACETAMINOPHEN 975 MG: 325 TABLET ORAL at 08:21

## 2024-02-21 RX ADMIN — IBUPROFEN 600 MG: 600 TABLET, FILM COATED ORAL at 03:35

## 2024-02-21 RX ADMIN — BUPROPION HYDROCHLORIDE 150 MG: 150 TABLET, FILM COATED, EXTENDED RELEASE ORAL at 08:21

## 2024-02-21 ASSESSMENT — PAIN SCALES - GENERAL
PAINLEVEL_OUTOF10: 7
PAINLEVEL_OUTOF10: 6
PAINLEVEL_OUTOF10: 10 - WORST POSSIBLE PAIN
PAINLEVEL_OUTOF10: 10 - WORST POSSIBLE PAIN
PAINLEVEL_OUTOF10: 7
PAINLEVEL_OUTOF10: 10 - WORST POSSIBLE PAIN

## 2024-02-21 ASSESSMENT — PAIN - FUNCTIONAL ASSESSMENT: PAIN_FUNCTIONAL_ASSESSMENT: 0-10

## 2024-02-21 ASSESSMENT — PAIN DESCRIPTION - DESCRIPTORS: DESCRIPTORS: ACHING;SORE

## 2024-02-21 NOTE — NURSING NOTE
Patient's left forearm 18G IV infiltrated while IV Venofer was infusing.   Patient received approximately half of the IV Venofer bag.  Infusion stopped and IV removed.  Bedside handoff given night shift RN.

## 2024-02-21 NOTE — TELEPHONE ENCOUNTER
VERONIQUE received a [facility] from [facility] ([email or web address]) [phone] explaining that she is assigned to the family's case. Zenaida also explained that patient signed a ROSIE and she would like to confirm SW's email address to send it over. VERONIQUE provided Zenaida with email address and explained that VERONIQUE will call Zenaida back once SW receive the ROSIE.     VERONIQUE called Zenaida back after receiving the ROSIE, addressed questions and confirmed that patient is scheduled for an appointment in [facility] clinic on 3/5/24.

## 2024-02-21 NOTE — CARE PLAN
The patient's goals for the shift include to have a vaginal birth    The clinical goals for the shift include  (Bonding with infant)    Patient's assessmemnt and vitals     Problem: Postpartum  Goal: Experiences normal postpartum course  2024 1804 by Tash Caro RN  Outcome: Progressing  2024 1800 by Tash Caro RN  Outcome: Progressing  Goal: Appropriate maternal -  bonding  2024 1804 by Tash Caro RN  Outcome: Progressing  2024 1800 by Tash Caro RN  Outcome: Progressing  Goal: Establish and maintain infant feeding pattern for adequate nutrition  2024 1804 by Tash Caro RN  Outcome: Progressing  2024 1800 by Tash Caro RN  Outcome: Progressing  Goal: Incisions, wounds, or drain sites healing without S/S of infection  2024 1804 by Tash Caro RN  Outcome: Progressing  2024 1800 by Tash Caro RN  Outcome: Progressing  Goal: No s/sx infection  2024 1804 by Tash Caro RN  Outcome: Progressing  2024 1800 by Tash Caro RN  Outcome: Progressing  Goal: No s/sx of hemorrhage  2024 1804 by Tash Caro RN  Outcome: Progressing  2024 1800 by Tash Caro RN  Outcome: Progressing  Goal: Minimal s/sx of HDP and BP<160/110  2024 1804 by Tash Caro RN  Outcome: Progressing  2024 1800 by Tash Caro RN  Outcome: Progressing   stable.

## 2024-02-21 NOTE — LACTATION NOTE
Lactation Consultant Note  Lactation Consultation  Reason for Consult: Follow-up assessment  Consultant Name: Helen Hill Rn, IBCLC    Maternal Information       Maternal Assessment  Breast Assessment: Medium, Soft  Nipple Assessment: Intact, Other (Comment) (did not view- baby was already latched)    Infant Assessment  Infant Behavior: Sucking, Suckles on and off, needs stimulation  Infant Assessment: Other (Comment) (d/f- baby already latched to the breast)    Feeding Assessment  Nutrition Source: Breastmilk  Feeding Method: Nursing at the breast  Feeding Position: Cradle, One side per feeding  Suck/Feeding: Sustained, Coordinated suck/swallow/breathe, Tactile stimulation needed, Audible swallowing with stimulaton  Latch Assessment: Deep latch obtained, Comfortable with no pain, Flanged lips    LATCH TOOL  Latch: Grasps breast, tongue down, lips flanged, rhythmic sucking  Audible Swallowing: A few with stimulation  Type of Nipple: Everted (After stimulation)  Comfort (Breast/Nipple): Soft/non-tender  Hold (Positioning): No assist from staff, mother able to position/hold infant  LATCH Score: 9    Breast Pump       Other OB Lactation Tools       Patient Follow-up  Inpatient Lactation Follow-up Needed : Yes  Outpatient Lactation Follow-up: Recommended    Other OB Lactation Documentation  Maternal Risk Factors:  delivery, Hypertension  Infant Risk Factors: Early term birth 37-39 weeks    Recommendations/Summary  Mom had baby latched to the breast when I entered the room. Deep latch observed- noted swallows with stimulation and mom stated latch as comfortable.   Encouraged her to allow the baby to naturally detach from the breast.       Reviewed feeding cues, breat feeding on demand, output on different days of life, average percentage of weight loss, milk production/ supply, and the other breastfeeding education topics.      Encouraged mom to breastfeed on demand with a goal of 8-12 feeds in a 24  hour period.   If baby is not showing feeding cues and it has been 3 hours since the last time the baby was fed or the last time the baby attempted to feed, encouraged her to place baby in skin to skin.      Mom received her breast pump for at home.     Denies any questions or concerns at  this time  .   Encouraged her to utilize the outpatient lactation resources, if needed.   Contact information given.   [phone] [place] or [phone] [place]

## 2024-02-21 NOTE — PROGRESS NOTES
Postpartum Progress Note    Assessment/Plan     Arlen Braga is a 28 y.o., , who initially presented for ECV, failed, LTCS.  She had a , Low Transverse  delivery on 2024  at 38w0d and is now POD2.    #cHTN  - nifed 30 -> 30 BID today  - asx  - Baseline HELLP labs neg     #Maternal well-being  - tobacco cessation: nicotine patch and wellbutrin  - SW onboard for connection to resources for sobriety  - hx alcoholic pancreatitis  - MJ use, accepts utox, denies other illicit drugs. Last use     #Post-op , Low Transverse   - continue routine postoperative care  - pain well controlled on ERAS protocol  - DVT Score: 5 SCDs and enoxaparin prophylactic dose daily while inpatient  - The patient's blood type is B POS. Rhogam is not indicated.    #Contraception  Post-placental IUD    #Maternal Well-Being  - emotional support provided  - bonding with infant and FOB @ bedside  -  feeding: breastfeeding/pumping encouraged; lactation consult prn     #Dispo  - Anticipate DC PPD#3 pending BP control.  - The signs and symptoms of PEC were reviewed with the patient, including unrelenting headache, vision changes/blurred vision, and RUQ pain  - BP cuff for home for checking BP twice a day  - Pt instructed to call primary OB if SBP > 160 or DBP > 110 or if development of PEC symptoms   - On discharge, follow up with primary OB in:       - 2-5 days for BP check       - 2 weeks for incision check       - 4-6 week for post-partum visit     Principal Problem:     delivery delivered  Active Problems:    Chronic hypertension    Asthma      Subjective   Her pain is well controlled with current medications  She is passing flatus  She is ambulating independently  She is tolerating a Adult diet Regular  She is voiding spontaneously  She reports no breast or nursing problems  She denies emotional concerns today     Denies HA, N/V, RUQ pain, vision changes.     Objective   Last Vitals:  Temp Pulse  Resp BP MAP Pulse Ox   37 °C (98.6 °F) 95 16 (!) 130/91   95 %     Vitals Min/Max Last 24 Hours:  Temp  Min: 36.6 °C (97.9 °F)  Max: 37.8 °C (100 °F)  Pulse  Min: 80  Max: 95  Resp  Min: 16  Max: 18  BP  Min: 130/91  Max: 150/88    Intake/Output:   No intake or output data in the 24 hours ending 02/21/24 2383      Physical Exam:  General: well appearing, well nourished, postpartum  Obstetric: fundus firm below umbilicus, lochia light  Skin: Warm, dry; no rashes/lesions/erythema; mepilex silver CDI; Pfannenstiel incision: clean, dry, well approximated, open to air, negative discharge/warmth/erythema   Breast: No masses, nipple discharge  Neuro: A/Ox3, no gross motor deficit   GI: mild distension, appropriately tender, soft, +BS  Respiratory: Even and unlabored on RA, LSCTA BL  Cardiovascular: No BLE edema; No erythema, warmth  Psych: appropriate mood and affect, pleasant    Lab Data:  Lab Results   Component Value Date    WBC 8.8 02/20/2024    HGB 8.8 (L) 02/20/2024    HCT 28.1 (L) 02/20/2024     02/20/2024

## 2024-02-21 NOTE — PROGRESS NOTES
Social Work Brief Note     Patient Name: Arlen Braga   [place] Name: Nory   : 24    Reason for Visit: Support     Assessment: [facility]  Zenaida Alvarado completed a visit today.  SW advised [facility] staff, mother of baby will be followed by [facility] staff in the community.  SW awaiting update from [facility] regarding discharge plan for     Plan: Ms. Braga MRN: [id number] is clear from SW perspective.  But, please do not discharge  girl Nory MRN: [id number] until appropriate discharge plan has been established.  SW will continue to follow, and assist with discharge planning as needed.  Please re-consult SW if additional concerns arise      [facility] Intake#[id number]  [facility] - Zenaida Alvarado: [phone] or [phone]  Uniform Report For Child Protection Form completed        Signature: Ruby MATHIAS [facility]

## 2024-02-22 VITALS
HEART RATE: 93 BPM | BODY MASS INDEX: 25.71 KG/M2 | OXYGEN SATURATION: 100 % | SYSTOLIC BLOOD PRESSURE: 133 MMHG | DIASTOLIC BLOOD PRESSURE: 95 MMHG | TEMPERATURE: 98.8 F | HEIGHT: 64 IN | RESPIRATION RATE: 18 BRPM | WEIGHT: 150.57 LBS

## 2024-02-22 LAB
LABORATORY COMMENT REPORT: NORMAL
PATH REPORT.FINAL DX SPEC: NORMAL
PATH REPORT.GROSS SPEC: NORMAL
PATH REPORT.RELEVANT HX SPEC: NORMAL
PATH REPORT.TOTAL CANCER: NORMAL

## 2024-02-22 PROCEDURE — 2500000002 HC RX 250 W HCPCS SELF ADMINISTERED DRUGS (ALT 637 FOR MEDICARE OP, ALT 636 FOR OP/ED): Performed by: NURSE PRACTITIONER

## 2024-02-22 PROCEDURE — 2500000001 HC RX 250 WO HCPCS SELF ADMINISTERED DRUGS (ALT 637 FOR MEDICARE OP)

## 2024-02-22 PROCEDURE — 2500000004 HC RX 250 GENERAL PHARMACY W/ HCPCS (ALT 636 FOR OP/ED)

## 2024-02-22 RX ORDER — FERROUS SULFATE 325(65) MG
65 TABLET ORAL
Qty: 30 TABLET | Refills: 3 | Status: SHIPPED | OUTPATIENT
Start: 2024-02-23 | End: 2024-03-12 | Stop reason: WASHOUT

## 2024-02-22 RX ORDER — BUPROPION HYDROCHLORIDE 150 MG/1
150 TABLET ORAL DAILY
Qty: 30 TABLET | Refills: 2 | Status: SHIPPED | OUTPATIENT
Start: 2024-02-23 | End: 2024-03-12 | Stop reason: ALTCHOICE

## 2024-02-22 RX ORDER — NIFEDIPINE 30 MG/1
30 TABLET, FILM COATED, EXTENDED RELEASE ORAL EVERY 12 HOURS
Qty: 60 TABLET | Refills: 3 | Status: SHIPPED | OUTPATIENT
Start: 2024-02-22 | End: 2024-03-12 | Stop reason: SDUPTHER

## 2024-02-22 RX ORDER — OXYCODONE HYDROCHLORIDE 5 MG/1
5 TABLET ORAL EVERY 4 HOURS PRN
Qty: 8 TABLET | Refills: 0 | Status: SHIPPED | OUTPATIENT
Start: 2024-02-22 | End: 2024-03-12 | Stop reason: WASHOUT

## 2024-02-22 RX ORDER — ACETAMINOPHEN 325 MG/1
975 TABLET ORAL EVERY 6 HOURS
Qty: 90 TABLET | Refills: 0 | Status: SHIPPED | OUTPATIENT
Start: 2024-02-22 | End: 2024-05-21 | Stop reason: ALTCHOICE

## 2024-02-22 RX ORDER — IBUPROFEN 600 MG/1
600 TABLET ORAL EVERY 6 HOURS
Qty: 30 TABLET | Refills: 0 | Status: SHIPPED | OUTPATIENT
Start: 2024-02-22 | End: 2024-04-16 | Stop reason: SDUPTHER

## 2024-02-22 RX ADMIN — IBUPROFEN 600 MG: 600 TABLET, FILM COATED ORAL at 09:14

## 2024-02-22 RX ADMIN — BUPROPION HYDROCHLORIDE 150 MG: 150 TABLET, FILM COATED, EXTENDED RELEASE ORAL at 09:14

## 2024-02-22 RX ADMIN — ENOXAPARIN SODIUM 40 MG: 100 INJECTION SUBCUTANEOUS at 03:05

## 2024-02-22 RX ADMIN — FERROUS SULFATE TAB 325 MG (65 MG ELEMENTAL FE) 1 TABLET: 325 (65 FE) TAB at 09:15

## 2024-02-22 RX ADMIN — ACETAMINOPHEN 975 MG: 325 TABLET ORAL at 03:05

## 2024-02-22 RX ADMIN — NIFEDIPINE 30 MG: 30 TABLET, FILM COATED, EXTENDED RELEASE ORAL at 06:56

## 2024-02-22 RX ADMIN — OXYCODONE HYDROCHLORIDE 5 MG: 5 TABLET ORAL at 03:04

## 2024-02-22 RX ADMIN — IBUPROFEN 600 MG: 600 TABLET, FILM COATED ORAL at 03:05

## 2024-02-22 RX ADMIN — ACETAMINOPHEN 975 MG: 325 TABLET ORAL at 09:13

## 2024-02-22 ASSESSMENT — PAIN SCALES - GENERAL
PAINLEVEL_OUTOF10: 5 - MODERATE PAIN
PAINLEVEL_OUTOF10: 0 - NO PAIN
PAINLEVEL_OUTOF10: 5 - MODERATE PAIN
PAINLEVEL_OUTOF10: 7
PAINLEVEL_OUTOF10: 7

## 2024-02-22 NOTE — DISCHARGE INSTRUCTIONS

## 2024-02-22 NOTE — DISCHARGE SUMMARY
Discharge Summary    Admission Date: 2024  Discharge Date: 24      Discharge Diagnosis   delivery delivered    Hospital Course  Delivery Date: 2024  1:04 PM   Delivery type: , Low Transverse    GA at delivery: 38w0d  Outcome: Living   Anesthesia during delivery: Combined spinal-epidural   Intrapartum complications: None;Fetal Intolerance   Feeding method: Breastfeeding Status: Yes     Procedures: none  Contraception at discharge: IUD      Pertinent Physical Exam At Time of Discharge    General: Examination reveals a well developed, well nourished, female, in no acute distress. She is alert and cooperative.  Lungs: symmetrical, non-labored breathing.  Cardiac: warm, well-perfused.  Abdomen: soft, non-tender.  Fundus: firm, at umbilicus, and nontender.  Extremities: no redness or tenderness in the calves or thighs.  Neurological: alert, oriented, normal speech, no focal findings or movement disorder noted.     Discharge Meds     Your medication list        START taking these medications        Instructions Last Dose Given Next Dose Due   acetaminophen 325 mg tablet  Commonly known as: Tylenol      Take 3 tablets (975 mg) by mouth every 6 hours.       ferrous sulfate (325 mg ferrous sulfate) tablet  Start taking on: 2024      Take 1 tablet by mouth once daily with breakfast. Do not start before 2024.       ibuprofen 600 mg tablet      Take 1 tablet (600 mg) by mouth every 6 hours.       NIFEdipine ER 30 mg 24 hr tablet  Commonly known as: Adalat CC      Take 1 tablet (30 mg) by mouth every 12 hours. Do not crush, chew, or split.       oxyCODONE 5 mg immediate release tablet  Commonly known as: Roxicodone      Take 1 tablet (5 mg) by mouth every 4 hours if needed (Pain score 6-8).              CONTINUE taking these medications        Instructions Last Dose Given Next Dose Due   buPROPion  mg 24 hr tablet  Commonly known as: Wellbutrin XL  Start taking on:  February 23, 2024      Take 1 tablet (150 mg) by mouth once daily. Do not crush, chew, or split. Do not start before February 23, 2024.       nicotine 7 mg/24 hr patch  Commonly known as: Nicoderm CQ      Place 1 patch over 24 hours on the skin once every 24 hours.              STOP taking these medications      albuterol 0.63 mg/3 mL nebulizer solution        Daily-Steffany tablet  Generic drug: multivitamin        gabapentin 300 mg capsule  Commonly known as: Neurontin        nicotine polacrilex 2 mg gum  Commonly known as: Nicorette        pantoprazole 20 mg EC tablet  Commonly known as: ProtoNix                  Where to Get Your Medications        These medications were sent to [facility]/pharmacy #[id number] - [place], [place] - [address]  [address] [facility] [address]      Phone: [phone]   acetaminophen 325 mg tablet  buPROPion  mg 24 hr tablet  ferrous sulfate (325 mg ferrous sulfate) tablet  ibuprofen 600 mg tablet  NIFEdipine ER 30 mg 24 hr tablet  oxyCODONE 5 mg immediate release tablet          Complications Requiring Follow-Up  Heavy vaginal bleeding, passing clots, fever and/or chills      Test Results Pending At Discharge  Pending Labs       Order Current Status    Surgical Pathology Exam - [facility] In process            Outpatient Follow-Up  Future Appointments   Date Time Provider Department Center   3/5/2024  3:00 PM Kathryn Dotson MD [id number]       I spent 3 minutes in the professional and overall care of this patient.      Megan Wilkins, APRN-CNP

## 2024-02-22 NOTE — CARE PLAN
Patient is clear for discharge. Lochia is light, pain is well controlled on PO meds, and vital signs are stable.

## 2024-02-22 NOTE — PROGRESS NOTES
Social Work Brief Note      Patient Name: Arlen Braga    Name: Nory   : 24     Reason for Visit: Support      Assessment: DCFS  Zenaida Alvarado stated Ms. Braga is clear to discharge home with .  Ms. Braga will be followed by [facility] staff in the community.  No other unmet needs identified on this date.  SW will continue to follow, and assist as needed.       Plan: Ms. Braga MRN: [id number], and  girl Nory MRN: [id number] are clear from SW perspective.  DCFS will continue to follow in the community.       [facility] Intake#[id number]  DCFS - Zenaida Alvarado: [phone] or [phone]  Uniform Report For Child Protection Form completed         Signature: Ruby Pack [facility]

## 2024-02-27 DIAGNOSIS — F10.90 ALCOHOL USE DISORDER: Primary | ICD-10-CM

## 2024-02-27 RX ORDER — NALTREXONE 380 MG
380 KIT INTRAMUSCULAR ONCE
Qty: 4 ML | Refills: 0 | Status: SHIPPED | OUTPATIENT
Start: 2024-02-27 | End: 2024-03-12 | Stop reason: WASHOUT

## 2024-02-27 RX ORDER — DISULFIRAM 250 MG/1
TABLET ORAL
Qty: 90 TABLET | Refills: 3 | Status: SHIPPED | OUTPATIENT
Start: 2024-02-27 | End: 2024-03-12 | Stop reason: WASHOUT

## 2024-02-29 ENCOUNTER — TELEPHONE (OUTPATIENT)
Dept: OBSTETRICS AND GYNECOLOGY | Facility: CLINIC | Age: 29
End: 2024-02-29
Payer: COMMERCIAL

## 2024-02-29 NOTE — TELEPHONE ENCOUNTER
----- Message from Arlen Braga sent at 2/28/2024  5:29 PM EST -----  Regarding: Arlen braga  Contact: [phone]  I’ve had a headache since noon and I took Tylenol and a nap

## 2024-02-29 NOTE — TELEPHONE ENCOUNTER
History of C/S delivery 2/19/24, CHT - on Nifedipine  TELEPHONE CALL TO PATIENT  Identified by name and date of birth.  States no longer has a headache  Has been taking BP's at home, normal  Offered to come in for BP check, to go to triage if persistent headaches, vision changes, chest pain.  Patient verbalizes understanding   Has appointment scheduled 3/5/24

## 2024-03-01 ENCOUNTER — TELEMEDICINE (OUTPATIENT)
Dept: BEHAVIORAL HEALTH | Facility: CLINIC | Age: 29
End: 2024-03-01
Payer: COMMERCIAL

## 2024-03-01 DIAGNOSIS — F10.10 MILD ALCOHOL USE DISORDER: ICD-10-CM

## 2024-03-01 DIAGNOSIS — F41.9 ANXIETY AND DEPRESSION: ICD-10-CM

## 2024-03-01 DIAGNOSIS — G47.00 INSOMNIA, UNSPECIFIED TYPE: ICD-10-CM

## 2024-03-01 DIAGNOSIS — F32.A ANXIETY AND DEPRESSION: ICD-10-CM

## 2024-03-01 PROCEDURE — 99213 OFFICE O/P EST LOW 20 MIN: CPT | Performed by: STUDENT IN AN ORGANIZED HEALTH CARE EDUCATION/TRAINING PROGRAM

## 2024-03-01 RX ORDER — GABAPENTIN 400 MG/1
400 CAPSULE ORAL 3 TIMES DAILY
Qty: 90 CAPSULE | Refills: 1 | Status: SHIPPED | OUTPATIENT
Start: 2024-03-01 | End: 2024-03-12 | Stop reason: SDUPTHER

## 2024-03-01 RX ORDER — TALC
3 POWDER (GRAM) TOPICAL NIGHTLY PRN
Qty: 30 TABLET | Refills: 1 | Status: SHIPPED | OUTPATIENT
Start: 2024-03-01 | End: 2024-03-12 | Stop reason: WASHOUT

## 2024-03-01 NOTE — PROGRESS NOTES
Subjective    All Individuals Present: Patient and Provider (Encounter Provider)     ID: Arlen Braga is a 28 y.o. female presenting for IOP intake.     Interval History/HPI/PFSH:  1 week PP (Nory)    Limited help, especially at night. Mom will help during the day.    Not entirely sure why she was referred to IOP. Struggles with depression and anxiety, largely related to psychosocial stressors. Feels stable since delivery. Bonding well with both children (infant and 8 yo)    Anxiety has bene more elevated, feels somewhat fidgety.    Has  as well.    Currently pumping and supplementing with Similac. Has had EtOH a few times, so unsure if she wants to try disulfiram.    Getting daily headaches, has limited response to hydration, dark, sleep, APAP, NSAIDs.    Denies SI, HI, AVH.     Medication side effects: None Reported         Review of Systems  Constitutional: Positive for fatigue, Negative for fevers and weight loss  Psychiatric: Positive for anxiety, excessive alcohol consumption, and sleep disturbance, Negative for depression, irritability, loss of interest in favorite activities, and mood swings  Neurological: Negative   Other: HTN, ERIC    Objective   LMP 05/29/2023   Wt Readings from Last 4 Encounters:   02/19/24 68.3 kg (150 lb 9.2 oz)   02/13/24 66.2 kg (146 lb)   02/06/24 65.8 kg (145 lb)   01/23/24 65.8 kg (145 lb)       Mental Status Exam  General Appearance: Well groomed, appropriate eye contact.  Attitude/Behavior: Cooperative, conversant, engaged, and with good eye contact.  Motor: No psychomotor agitation or retardation, no tremor or other abnormal movements.  Speech: Normal rate, volume, prosody  Gait/Station: Within normal limits  Mood: anxious.  Affect: Anxious and Congruent with mood and topic of conversation  Thought Process: Linear, goal directed  Thought Associations: No loosening of associations  Thought Content: normal  Sensorium: Alert and oriented to person, place,  time and situation  Insight: Fair, as evidenced by awareness of need for treatment and having problem with Etoh  Judgment: Limited re: alcohol misuse  Cognition: Cognitively intact to conversational testing with respect to attention, orientation, fund of knowledge, recent and remote memory, and language.  Testing: N/A.    Laboratory/Imaging/Diagnostic Tests       Assessment/Plan   Overall Formulation and Differential Diagnosis:  Arlen Braga is a 28 y.o. female who meets criteria for MADDI, AUD. Patient does not desire to do mood IOP, would like more treatment for alcohol use, will refer to [facility] IOP.    Interval Assessment:    -patient defers IOP at this time  -follow-up as needed for anxiety/mood; continue treatment with Dr. Dotson  -continue gabapentin 400 mg PO TID  -continue melatonin PRN sleep    Risk Assessment:  Imminent Risk of Suicide or Serious Self-Injury: Low Risk -- Risk factors include: Alcohol or other substance abuse and Lack of social supports  Protective factors include:Denies current suicidal ideation, Future-oriented talk , Willingness to seek help and support , and Restricted access to firearms or other lethal means of suicide   Imminent Risk of Violence or Homicide: Low Risk - Risk factors include: No significant risk factors identified on screening. Protective factors include: Lack of known history of harm to others , Lack of known history of violent ideation , and Lack of known access to firearms   Treatment Plan:  There are no recently modified care plans to display for this patient.      Attestation Statements   Number of Minutes Spent Performing Evaluation & Management (E&M): 25

## 2024-03-04 ENCOUNTER — DOCUMENTATION (OUTPATIENT)
Dept: BEHAVIORAL HEALTH | Facility: CLINIC | Age: 29
End: 2024-03-04
Payer: COMMERCIAL

## 2024-03-04 ENCOUNTER — APPOINTMENT (OUTPATIENT)
Dept: BEHAVIORAL HEALTH | Facility: CLINIC | Age: 29
End: 2024-03-04
Payer: COMMERCIAL

## 2024-03-04 ENCOUNTER — TELEPHONE (OUTPATIENT)
Dept: PEDIATRICS | Facility: CLINIC | Age: 29
End: 2024-03-04
Payer: COMMERCIAL

## 2024-03-04 NOTE — PROGRESS NOTES
Pt arrived for  IOP intake. At the start of the appointment provider reminded pt of the program format and frequency/timing of groups. Pt shared feeling her mood has been stable since giving birth and that she does not feel the need or desire to engage in the IOP program at this time. She expressed that she would reach out should she wish to re-engage with IOP in the future and she is aware that she is eligible for IOP up to a year postpartum.   INES Avendano  ,  IOP

## 2024-03-04 NOTE — TELEPHONE ENCOUNTER
JOJO PIPER was able to reach pt to remind her of her apt tomorrow.  Pt stated that she will be present for her apt.

## 2024-03-05 ENCOUNTER — TELEPHONE (OUTPATIENT)
Dept: OBSTETRICS AND GYNECOLOGY | Facility: CLINIC | Age: 29
End: 2024-03-05
Payer: COMMERCIAL

## 2024-03-05 NOTE — TELEPHONE ENCOUNTER
SW called patient attempting to reschedule patient's appointment after observing that patient did not show up for scheduled appointment in [facility] clinic. SW left a message and requested a call back.

## 2024-03-11 ENCOUNTER — TELEPHONE (OUTPATIENT)
Dept: PEDIATRICS | Facility: CLINIC | Age: 29
End: 2024-03-11
Payer: COMMERCIAL

## 2024-03-11 NOTE — TELEPHONE ENCOUNTER
JOJO PIPER spoke with pt to remind her of her appointment tomorrow.  Pt stated that she will be present for her appointment.

## 2024-03-12 ENCOUNTER — OFFICE VISIT (OUTPATIENT)
Dept: OBSTETRICS AND GYNECOLOGY | Facility: CLINIC | Age: 29
End: 2024-03-12
Payer: COMMERCIAL

## 2024-03-12 VITALS
BODY MASS INDEX: 22.16 KG/M2 | DIASTOLIC BLOOD PRESSURE: 107 MMHG | HEART RATE: 87 BPM | HEIGHT: 65 IN | WEIGHT: 133 LBS | SYSTOLIC BLOOD PRESSURE: 134 MMHG

## 2024-03-12 DIAGNOSIS — I10 CHRONIC HYPERTENSION: ICD-10-CM

## 2024-03-12 DIAGNOSIS — F32.A ANXIETY AND DEPRESSION: ICD-10-CM

## 2024-03-12 DIAGNOSIS — R63.4 WEIGHT LOSS: ICD-10-CM

## 2024-03-12 DIAGNOSIS — F10.10 MILD ALCOHOL USE DISORDER: ICD-10-CM

## 2024-03-12 DIAGNOSIS — F41.9 ANXIETY AND DEPRESSION: ICD-10-CM

## 2024-03-12 PROCEDURE — 3075F SYST BP GE 130 - 139MM HG: CPT | Performed by: OBSTETRICS & GYNECOLOGY

## 2024-03-12 PROCEDURE — 99214 OFFICE O/P EST MOD 30 MIN: CPT | Mod: TH | Performed by: OBSTETRICS & GYNECOLOGY

## 2024-03-12 PROCEDURE — 3080F DIAST BP >= 90 MM HG: CPT | Performed by: OBSTETRICS & GYNECOLOGY

## 2024-03-12 PROCEDURE — 99214 OFFICE O/P EST MOD 30 MIN: CPT | Performed by: OBSTETRICS & GYNECOLOGY

## 2024-03-12 RX ORDER — GABAPENTIN 400 MG/1
400 CAPSULE ORAL 3 TIMES DAILY
Qty: 90 CAPSULE | Refills: 1 | Status: SHIPPED | OUTPATIENT
Start: 2024-03-12 | End: 2024-05-21 | Stop reason: ALTCHOICE

## 2024-03-12 RX ORDER — FEEDER CONTAINER WITH PUMP SET
1 EACH MISCELLANEOUS 3 TIMES DAILY
Qty: 1000 ML | Refills: 11 | Status: SHIPPED | OUTPATIENT
Start: 2024-03-12 | End: 2024-04-16 | Stop reason: SDUPTHER

## 2024-03-12 RX ORDER — NALTREXONE HYDROCHLORIDE 50 MG/1
50 TABLET, FILM COATED ORAL DAILY
Qty: 30 TABLET | Refills: 11 | Status: SHIPPED | OUTPATIENT
Start: 2024-03-12 | End: 2024-05-21 | Stop reason: ALTCHOICE

## 2024-03-12 RX ORDER — SERTRALINE HYDROCHLORIDE 50 MG/1
50 TABLET, FILM COATED ORAL DAILY
Qty: 30 TABLET | Refills: 5 | Status: SHIPPED | OUTPATIENT
Start: 2024-03-12 | End: 2024-05-21 | Stop reason: ALTCHOICE

## 2024-03-12 RX ORDER — NIFEDIPINE 30 MG/1
30 TABLET, FILM COATED, EXTENDED RELEASE ORAL DAILY
Qty: 90 TABLET | Refills: 0 | Status: SHIPPED | OUTPATIENT
Start: 2024-03-12 | End: 2024-05-21 | Stop reason: ALTCHOICE

## 2024-03-12 ASSESSMENT — EDINBURGH POSTNATAL DEPRESSION SCALE (EPDS)
I HAVE BEEN SO UNHAPPY THAT I HAVE BEEN CRYING: NO, NEVER
I HAVE BEEN ABLE TO LAUGH AND SEE THE FUNNY SIDE OF THINGS: AS MUCH AS I ALWAYS COULD
THINGS HAVE BEEN GETTING ON TOP OF ME: NO, I HAVE BEEN COPING AS WELL AS EVER
I HAVE FELT SCARED OR PANICKY FOR NO GOOD REASON: NO, NOT AT ALL
THE THOUGHT OF HARMING MYSELF HAS OCCURRED TO ME: NEVER
I HAVE BEEN SO UNHAPPY THAT I HAVE HAD DIFFICULTY SLEEPING: NOT AT ALL
I HAVE BLAMED MYSELF UNNECESSARILY WHEN THINGS WENT WRONG: NO, NEVER
I HAVE FELT SAD OR MISERABLE: NO, NOT AT ALL
I HAVE BEEN ANXIOUS OR WORRIED FOR NO GOOD REASON: NO, NOT AT ALL
I HAVE LOOKED FORWARD WITH ENJOYMENT TO THINGS: AS MUCH AS I EVER DID
TOTAL SCORE: 0

## 2024-03-12 ASSESSMENT — PAIN SCALES - GENERAL: PAINLEVEL: 0-NO PAIN

## 2024-03-12 NOTE — PROGRESS NOTES
"Assessment   IMPRESSIONS:  Normal postpartum recovery    Kaylah IUD properly in place    AUD:  - cont IOP at [facility]  - Start oral naltrexone, ideally daily, but ok to use as targeted treatment as well. Risks, expected benefits, and potential side effects of treatment reviewed with patient. If tolerates well, pt would be a good candidate for monthly Vivitrol injections.   - Cont Gabapentin 300mg TID    MADDI  - Cont Gabapentin 300mg TID  - Add Zoloft 50mg daily   - Stop Wellbutrin since she is not taking consistently and didn't find it particularly helpful    CHTN: cont Nifed 30mg daily    FU 4 weeks    Kathryn Dotson MD    Subjective   Arlen Braga  presenting for a postpartum visit with the [facility]    Prenatal Provider: [facility] --> [facility]  Delivery Location:  [facility]  Delivery Date: 2024   GA at Delivery: 38  Type of Delivery: , Low Transverse  for breech presentation, with bradycardia during attempted ECV    Preg c/b  CHTN: started on meds during delivery hospitalization  Mild AUD with admission 2023 for alcoholic pancreatitis  Tobacco Use: tried nicotine patch  MADDI: on gabapentin and wellbutrin  Asthma  GERD    Concerns: Wants IUD strings checked bc she had an episode of pelvic pain a few days ago that has now resolved. Having to go to a lot of different apptmts due to open DCFS case. Some stress with being told her care team is likely going to change (eg, peer supporter changing, was told she will not be seeing me anymore for med mgmt as Emily from [facility] will be taking over). Reports feeling generally more anxious, very low appetite.     Pain: controlled  Lacerations: Pfannestiel well healed  Lochia: none  Menses: No  Sexual Intimacy: No  Contraceptive Method: IUD  Feeding Method: She is breast feeding with some supplementation.   Lactation Consult Needed?: No    Birth Trauma: No  Bonding with Baby: well with Female \"Nory\"  Mood: a bit stressed    [facility] Treatment:  - " "Appropriate UDS at delivery: UDS was + THC (no quant) and alcohol prior to dleivery  - MAT: pt interested in starting medications today  - Behavioral Treatment: attending IOP at [facility] now     NICU/RBC4 Admission: No  Child Dispo: With mom  Open DCFS case: Yes    Objective   BP (!) 134/107   Pulse 87   Ht 1.638 m (5' 4.5\")   Wt 60.3 kg (133 lb)   LMP 2023 (Exact Date) Comment: IUD in place  Breastfeeding Yes   BMI 22.48 kg/m²      General:   Alert and oriented, in no acute distress   Neck: Supple. No visible thyromegaly.    Breast/Axilla: Normal to palpation bilaterally without masses, skin changes, or nipple discharge.    Abdomen: Soft, non-tender, without masses or organomegaly. Incision C/D/I.   Vulva: Normal architecture without erythema, masses, or lesions.    Vagina: Normal mucosa without lesions, masses, or atrophy. No abnormal vaginal discharge.    Cervix: Normal without masses, lesions, or signs of cervicitis. IUD strings visualized.   Uterus: Normal mobile, non-enlarged uterus    Adnexa: Normal without masses or lesions   Pelvic Floor No POP noted. No high tone pelvic floor    Psych Normal affect. Normal mood.      "

## 2024-03-12 NOTE — LETTER
2024     Emily Frye  [address] [facility] [address]    Patient: Arlen Braga   YOB: 1995   Date of Visit: 3/12/2024       Dear Dr. Emily Frye:    Thank you for referring Arlen Braga to me for evaluation. Below are my notes for this consultation.  If you have questions, please do not hesitate to call me. I look forward to following your patient along with you.       Sincerely,     Kathryn Dotson MD      CC: No Recipients  ______________________________________________________________________________________    Assessment  IMPRESSIONS:  Normal postpartum recovery    Liletta IUD properly in place    AUD:  - cont IOP at [facility]  - Start oral naltrexone, ideally daily, but ok to use as targeted treatment as well. Risks, expected benefits, and potential side effects of treatment reviewed with patient. If tolerates well, pt would be a good candidate for monthly Vivitrol injections.   - Cont Gabapentin 300mg TID    MADDI  - Cont Gabapentin 300mg TID  - Add Zoloft 50mg daily   - Stop Wellbutrin since she is not taking consistently and didn't find it particularly helpful    CHTN: cont Nifed 30mg daily    FU 4 weeks    Kathryn Dotson MD    Subjective  Arlen Braga  presenting for a postpartum visit with the [facility]    Prenatal Provider: [facility] --> [facility]  Delivery Location:  [facility]  Delivery Date: 2024   GA at Delivery: 38  Type of Delivery: , Low Transverse  for breech presentation, with bradycardia during attempted ECV    Preg c/b  CHTN: started on meds during delivery hospitalization  Mild AUD with admission 2023 for alcoholic pancreatitis  Tobacco Use: tried nicotine patch  MADDI: on gabapentin and wellbutrin  Asthma  GERD    Concerns: Wants IUD strings checked bc she had an episode of pelvic pain a few days ago that has now resolved. Having to go to a lot of different apptmts due to open DCFS case. Some stress with being told her care  "team is likely going to change (eg, peer supporter changing, was told she will not be seeing me anymore for med mgmt as Emily from [facility] will be taking over). Reports feeling generally more anxious, very low appetite.     Pain: controlled  Lacerations: Toño well healed  Lochia: none  Menses: No  Sexual Intimacy: No  Contraceptive Method: IUD  Feeding Method: She is breast feeding with some supplementation.   Lactation Consult Needed?: No    Birth Trauma: No  Bonding with Baby: well with Female \"Nory\"  Mood: a bit stressed    [facility] Treatment:  - Appropriate UDS at delivery: UDS was + THC (no quant) and alcohol prior to dleivery  - MAT: pt interested in starting medications today  - Behavioral Treatment: attending IOP at [facility] now     NICU/[facility] Admission: No  Child Dispo: With mom  Open DCFS case: Yes    Objective  BP (!) 134/107   Pulse 87   Ht 1.638 m (5' 4.5\")   Wt 60.3 kg (133 lb)   LMP 2023 (Exact Date) Comment: IUD in place  Breastfeeding Yes   BMI 22.48 kg/m²      General:   Alert and oriented, in no acute distress   Neck: Supple. No visible thyromegaly.    Breast/Axilla: Normal to palpation bilaterally without masses, skin changes, or nipple discharge.    Abdomen: Soft, non-tender, without masses or organomegaly. Incision C/D/I.   Vulva: Normal architecture without erythema, masses, or lesions.    Vagina: Normal mucosa without lesions, masses, or atrophy. No abnormal vaginal discharge.    Cervix: Normal without masses, lesions, or signs of cervicitis. IUD strings visualized.   Uterus: Normal mobile, non-enlarged uterus    Adnexa: Normal without masses or lesions   Pelvic Floor No POP noted. No high tone pelvic floor    Psych Normal affect. Normal mood.      "

## 2024-03-13 PROBLEM — R63.4 WEIGHT LOSS: Status: ACTIVE | Noted: 2024-03-13

## 2024-03-18 ENCOUNTER — TELEPHONE (OUTPATIENT)
Dept: PEDIATRICS | Facility: CLINIC | Age: 29
End: 2024-03-18
Payer: COMMERCIAL

## 2024-03-25 ENCOUNTER — PHARMACY VISIT (OUTPATIENT)
Dept: PHARMACY | Facility: CLINIC | Age: 29
End: 2024-03-25
Payer: MEDICAID

## 2024-03-25 PROCEDURE — RXMED WILLOW AMBULATORY MEDICATION CHARGE

## 2024-04-08 ENCOUNTER — TELEPHONE (OUTPATIENT)
Dept: PEDIATRICS | Facility: CLINIC | Age: 29
End: 2024-04-08
Payer: COMMERCIAL

## 2024-04-08 NOTE — TELEPHONE ENCOUNTER
JOJO PIPER called pt, but pt was still sleeping.  VERONIQUE reminded pt of her appointment tomorrow and to call back if she needed a ride.

## 2024-04-09 ENCOUNTER — OFFICE VISIT (OUTPATIENT)
Dept: OBSTETRICS AND GYNECOLOGY | Facility: CLINIC | Age: 29
End: 2024-04-09
Payer: COMMERCIAL

## 2024-04-09 VITALS
DIASTOLIC BLOOD PRESSURE: 75 MMHG | SYSTOLIC BLOOD PRESSURE: 118 MMHG | HEIGHT: 65 IN | WEIGHT: 125 LBS | BODY MASS INDEX: 20.83 KG/M2

## 2024-04-09 DIAGNOSIS — F41.9 ANXIETY AND DEPRESSION: ICD-10-CM

## 2024-04-09 DIAGNOSIS — I10 CHRONIC HYPERTENSION: ICD-10-CM

## 2024-04-09 DIAGNOSIS — F32.A ANXIETY AND DEPRESSION: ICD-10-CM

## 2024-04-09 DIAGNOSIS — F10.90 ALCOHOL USE DISORDER: Primary | ICD-10-CM

## 2024-04-09 LAB
AMPHETAMINES UR QL SCN: NORMAL
BARBITURATES UR QL SCN: NORMAL
BENZODIAZ UR QL SCN: NORMAL
BZE UR QL SCN: NORMAL
CANNABINOIDS UR QL SCN: NORMAL
FENTANYL+NORFENTANYL UR QL SCN: NORMAL
METHADONE UR QL SCN: NORMAL
OPIATES UR QL SCN: NORMAL
OXYCODONE+OXYMORPHONE UR QL SCN: NORMAL
PCP UR QL SCN: NORMAL

## 2024-04-09 PROCEDURE — 3074F SYST BP LT 130 MM HG: CPT | Performed by: OBSTETRICS & GYNECOLOGY

## 2024-04-09 PROCEDURE — RXMED WILLOW AMBULATORY MEDICATION CHARGE

## 2024-04-09 PROCEDURE — 3078F DIAST BP <80 MM HG: CPT | Performed by: OBSTETRICS & GYNECOLOGY

## 2024-04-09 PROCEDURE — 80307 DRUG TEST PRSMV CHEM ANLYZR: CPT | Performed by: OBSTETRICS & GYNECOLOGY

## 2024-04-09 PROCEDURE — 99214 OFFICE O/P EST MOD 30 MIN: CPT | Mod: TH | Performed by: OBSTETRICS & GYNECOLOGY

## 2024-04-09 PROCEDURE — 99214 OFFICE O/P EST MOD 30 MIN: CPT | Performed by: OBSTETRICS & GYNECOLOGY

## 2024-04-09 RX ORDER — NALTREXONE 380 MG
380 KIT INTRAMUSCULAR ONCE
Qty: 4 ML | Refills: 0 | Status: SHIPPED | OUTPATIENT
Start: 2024-04-09 | End: 2024-05-13 | Stop reason: SDUPTHER

## 2024-04-09 ASSESSMENT — PAIN SCALES - GENERAL: PAINLEVEL: 0-NO PAIN

## 2024-04-09 NOTE — PROGRESS NOTES
"Assessment    AUD:  - cont IOP at [facility]  - Transition from naltrexone 50mg daily --> monthly Vivitrol. Plan for injection next week. Initial GI upset from using naltrexone has abated.  - UDS today with reflex, urine ethyl glucuronide with patient consent     2. MADDI  - Cont Gabapentin 300mg TID  - Reduce to Zoloft 25mg daily as she felt sedated on 50mg daily and felt the 25mg was still helpful     3. CHTN, not previously on meds: stop Nifed 30mg daily, monitor BP to see if she still requires antihypertensives. Goal BP <140/90.    FU 1 week for BP check and Vivitrol injection     Kathryn Dotson MD    Subjective   Arlen Braga  presenting for a postpartum visit with the [facility]. Delivered baby blayne Cueto at 38 weeks on 24 via LTCS for breech presentation with bradycardia during attempted ECV. Baby home with mother with DCFS following.      Preg c/b  CHTN: started on meds during delivery hospitalization  Mild AUD with admission 2023 for alcoholic pancreatitis  Tobacco Use: tried nicotine patch  MADDI: on gabapentin and Zoloft    Interval Hx:  Things going well at [facility].   Did not get connected with the psych NP there yet.  May just want me to continue her med mgmt.  Endorses sobriety.  Felt that naltrexone was very helpful for AUD.  \"Took all the urge away.\"  Pt consents to UDS today in clinic - \"you won't see nothing there.\"    Mood good.  Denies anxiety.  Feels that addition of Zoloft was helpful, but made her feel sedated.  Pt has only been taking a half tab.    Things going well with DCFS for baby girl \"Nory.\"  Just got WIC.  Finally stopped bleeding on Liletta.    Current Meds:  Zoloft 25mg daily  Gabapentin 300mg TID  Naltrexone 50mg daily --> feels like it has been really helpful, would like to start Vivitrol  Nifed 30mg daily    AUD Treatment:  - Appropriate UDS at delivery: UDS was + THC (no quant) and alcohol prior to delivery  - MAT: Naltrexone 50mg daily  - Behavioral " "Treatment: AA twice weekly + IOP at [facility], graduating in June 2024     Objective   /75   Ht 1.638 m (5' 4.5\")   Wt 56.7 kg (125 lb)   LMP 05/29/2023 (Exact Date) Comment: IUD in place  Breastfeeding No   BMI 21.12 kg/m²      General:   Alert and oriented, in no acute distress   Psych Normal affect. Normal mood.        "

## 2024-04-11 LAB — ETHYL GLUCURONIDE UR QL SCN: NEGATIVE NG/ML

## 2024-04-15 ENCOUNTER — TELEPHONE (OUTPATIENT)
Dept: PEDIATRICS | Facility: CLINIC | Age: 29
End: 2024-04-15
Payer: COMMERCIAL

## 2024-04-15 NOTE — TELEPHONE ENCOUNTER
JOJO PIPER contacted pt to remind her of her appointment.  Pt stated that she's attempting to get her work shift covered so she can attend her appointment tomorrow.  SW asked pt to keep her updated.  Pt stated that she will.

## 2024-04-16 ENCOUNTER — SOCIAL WORK (OUTPATIENT)
Dept: PEDIATRICS | Facility: CLINIC | Age: 29
End: 2024-04-16

## 2024-04-16 ENCOUNTER — OFFICE VISIT (OUTPATIENT)
Dept: OBSTETRICS AND GYNECOLOGY | Facility: CLINIC | Age: 29
End: 2024-04-16
Payer: COMMERCIAL

## 2024-04-16 ENCOUNTER — PHARMACY VISIT (OUTPATIENT)
Dept: PHARMACY | Facility: CLINIC | Age: 29
End: 2024-04-16
Payer: MEDICAID

## 2024-04-16 VITALS
SYSTOLIC BLOOD PRESSURE: 129 MMHG | HEART RATE: 112 BPM | DIASTOLIC BLOOD PRESSURE: 89 MMHG | WEIGHT: 126.7 LBS | BODY MASS INDEX: 21.41 KG/M2

## 2024-04-16 DIAGNOSIS — F10.90 ALCOHOL USE DISORDER: Primary | ICD-10-CM

## 2024-04-16 DIAGNOSIS — R63.4 WEIGHT LOSS: ICD-10-CM

## 2024-04-16 DIAGNOSIS — I10 CHRONIC HYPERTENSION: ICD-10-CM

## 2024-04-16 DIAGNOSIS — R10.2 PELVIC PAIN: ICD-10-CM

## 2024-04-16 PROCEDURE — 2500000004 HC RX 250 GENERAL PHARMACY W/ HCPCS (ALT 636 FOR OP/ED): Mod: SE | Performed by: OBSTETRICS & GYNECOLOGY

## 2024-04-16 PROCEDURE — 3074F SYST BP LT 130 MM HG: CPT | Performed by: OBSTETRICS & GYNECOLOGY

## 2024-04-16 PROCEDURE — 96372 THER/PROPH/DIAG INJ SC/IM: CPT | Performed by: OBSTETRICS & GYNECOLOGY

## 2024-04-16 PROCEDURE — 3079F DIAST BP 80-89 MM HG: CPT | Performed by: OBSTETRICS & GYNECOLOGY

## 2024-04-16 PROCEDURE — RXMED WILLOW AMBULATORY MEDICATION CHARGE

## 2024-04-16 PROCEDURE — 99214 OFFICE O/P EST MOD 30 MIN: CPT | Mod: TH,25 | Performed by: OBSTETRICS & GYNECOLOGY

## 2024-04-16 RX ORDER — IBUPROFEN 600 MG/1
600 TABLET ORAL EVERY 6 HOURS
Qty: 60 TABLET | Refills: 0 | Status: SHIPPED | OUTPATIENT
Start: 2024-04-16 | End: 2024-05-21 | Stop reason: ALTCHOICE

## 2024-04-16 RX ORDER — FEEDER CONTAINER WITH PUMP SET
1 EACH MISCELLANEOUS 3 TIMES DAILY
Qty: 1000 ML | Refills: 11 | Status: SHIPPED | OUTPATIENT
Start: 2024-04-16 | End: 2024-05-21 | Stop reason: ALTCHOICE

## 2024-04-16 RX ADMIN — Medication 380 MG: at 12:05

## 2024-04-16 ASSESSMENT — ENCOUNTER SYMPTOMS
CONSTITUTIONAL NEGATIVE: 0
GASTROINTESTINAL NEGATIVE: 0
ALLERGIC/IMMUNOLOGIC NEGATIVE: 0
ENDOCRINE NEGATIVE: 0
RESPIRATORY NEGATIVE: 0
EYES NEGATIVE: 0
HEMATOLOGIC/LYMPHATIC NEGATIVE: 0
PSYCHIATRIC NEGATIVE: 0
CARDIOVASCULAR NEGATIVE: 0
NEUROLOGICAL NEGATIVE: 0
MUSCULOSKELETAL NEGATIVE: 0

## 2024-04-16 NOTE — PROGRESS NOTES
"Assessment      AUD:  - cont IOP at [facility]  - Vivitrol IM today. Risks, expected benefits, and potential side effects of treatment reviewed with patient  - UDS appropriate      2. MADDI  - Cont Gabapentin 300mg TID  - Cont Zoloft 25mg daily     3. CHTN, not previously on meds: BP at goal off Nifed 30mg    4. Pelvic pain  - Liletta appears normally placed on SSE today but pt is uncomfortable with uterine manipulation  - No fevers. Low suspicion for endometritis as pt is 8 weeks PP  - Pelvic US per pt request  - Pt open to monitoring for another month, and if pain does not improve, she may want IUD removed and go to [facility] patch instead as BP now nl. She had AUB on Depo and would like to avoid any treatment at high risk for AUB.     FU 1 month or earlier as indicated if IUD improperly positioned.     Kathryn Dotson MD     Subjective   Arlen Omi  presenting for a postpartum visit with the [facility]. Delivered baby girl Nory at 38 weeks on 24 via LTCS for breech presentation with bradycardia during attempted ECV. Baby home with mother with DCFS following.      Preg c/b  CHTN: started on meds during delivery hospitalization, discharged on Nifed 30mg daily  Mild AUD with admission 2023 for alcoholic pancreatitis. Started on Gabapentin antepartum and pt accepted naltrexone postpartum.  Tobacco Use: tried nicotine patch  MADDI: on gabapentin and Zoloft     Interval Hx:  Things going well at [facility].   Endorses sobriety.  Ready for transition to Vivitrol as oral naltrexone \"took all the urge away.\"  Mood good on Gabapentin and Zoloft.   Things going well with DCFS for baby girl \"Nory.\"    Stopped Nifedipine after our last apptmt.   BP at goal.    Feeling pain and lower abd aching x 2 days.  Also started bleeding dark red blood same time pain started.  Ibuprofen helps somewhat, but still quite painful when she urinates.  IUD strings appear normally placed on SSE today.  Pt interested in US.   "   Current Meds:  Zoloft 25mg daily  Gabapentin 300mg TID  Naltrexone 50mg daily --> transitioning to Vivitrol     AUD Treatment:  - Appropriate UDS at delivery: UDS was + THC (no quant) and alcohol prior to delivery  - MAT: Naltrexone 50mg daily  - Behavioral Treatment: AA twice weekly + IOP at [facility], graduating in June 2024     Objective   /89   Pulse (!) 112   Wt 57.5 kg (126 lb 11.2 oz)   Breastfeeding No   BMI 21.41 kg/m²      General:   Alert and oriented, in no acute distress   Abdomen: Soft, non-tender, without masses or organomegaly.  Incision C/D/I   Vulva: Normal architecture without erythema, masses, or lesions.    Vagina: Normal mucosa without lesions, masses, or atrophy. No abnormal vaginal discharge. + dark red blood in vault.   Cervix: Normal without masses, lesions, or signs of cervicitis. IUD strings seem at 5 o'clock with nl length.   Uterus: Normal mobile, non-enlarged uterus but TTP.   Adnexa: Normal without masses or lesions   Pelvic Floor No POP noted. No high tone pelvic floor    Psych Normal affect. Normal mood.

## 2024-04-18 NOTE — PROGRESS NOTES
JOJO PIPER met with pt to address needs and to provide any supportive services.  SW and pt discussed any updates regarding the open DCFS case.  Pt shared that [facility].  Pt asked what she should do.  SW suggested to pt that she call DCFS and speak with a supervisor to get a clear understanding of what is expected of her.  SW was able to schedule pt for their follow-up visit.  Pt shared that she has a new phone number which she provided to VERONIQUE.  VERONIQUE encouraged pt to contact [facility] team if they have any additional concerns or questions.

## 2024-04-29 ENCOUNTER — APPOINTMENT (OUTPATIENT)
Dept: GASTROENTEROLOGY | Facility: HOSPITAL | Age: 29
End: 2024-04-29
Payer: COMMERCIAL

## 2024-05-06 ENCOUNTER — TELEPHONE (OUTPATIENT)
Dept: PEDIATRICS | Facility: CLINIC | Age: 29
End: 2024-05-06
Payer: COMMERCIAL

## 2024-05-06 NOTE — TELEPHONE ENCOUNTER
SW spoke with pt to remind her of her apt the next day.  Pt stated that she will be present for her apt tomorrow.

## 2024-05-13 DIAGNOSIS — F10.90 ALCOHOL USE DISORDER: Primary | ICD-10-CM

## 2024-05-13 PROCEDURE — RXMED WILLOW AMBULATORY MEDICATION CHARGE

## 2024-05-13 RX ORDER — NALTREXONE 380 MG
380 KIT INTRAMUSCULAR ONCE
Qty: 4 ML | Refills: 0 | Status: SHIPPED | OUTPATIENT
Start: 2024-05-13 | End: 2024-05-21 | Stop reason: SDUPTHER

## 2024-05-20 ENCOUNTER — TELEPHONE (OUTPATIENT)
Dept: PEDIATRICS | Facility: CLINIC | Age: 29
End: 2024-05-20
Payer: COMMERCIAL

## 2024-05-21 ENCOUNTER — OFFICE VISIT (OUTPATIENT)
Dept: OBSTETRICS AND GYNECOLOGY | Facility: CLINIC | Age: 29
End: 2024-05-21
Payer: COMMERCIAL

## 2024-05-21 ENCOUNTER — PHARMACY VISIT (OUTPATIENT)
Dept: PHARMACY | Facility: CLINIC | Age: 29
End: 2024-05-21
Payer: MEDICAID

## 2024-05-21 VITALS
WEIGHT: 126.5 LBS | HEIGHT: 64 IN | HEART RATE: 91 BPM | DIASTOLIC BLOOD PRESSURE: 81 MMHG | SYSTOLIC BLOOD PRESSURE: 128 MMHG | BODY MASS INDEX: 21.6 KG/M2

## 2024-05-21 DIAGNOSIS — F10.90 ALCOHOL USE DISORDER: Primary | ICD-10-CM

## 2024-05-21 DIAGNOSIS — F32.A ANXIETY AND DEPRESSION: ICD-10-CM

## 2024-05-21 DIAGNOSIS — F41.9 ANXIETY AND DEPRESSION: ICD-10-CM

## 2024-05-21 PROCEDURE — 99214 OFFICE O/P EST MOD 30 MIN: CPT | Mod: TH | Performed by: OBSTETRICS & GYNECOLOGY

## 2024-05-21 PROCEDURE — 99214 OFFICE O/P EST MOD 30 MIN: CPT | Performed by: OBSTETRICS & GYNECOLOGY

## 2024-05-21 PROCEDURE — 80307 DRUG TEST PRSMV CHEM ANLYZR: CPT | Performed by: OBSTETRICS & GYNECOLOGY

## 2024-05-21 PROCEDURE — 3074F SYST BP LT 130 MM HG: CPT | Performed by: OBSTETRICS & GYNECOLOGY

## 2024-05-21 PROCEDURE — 96372 THER/PROPH/DIAG INJ SC/IM: CPT | Performed by: OBSTETRICS & GYNECOLOGY

## 2024-05-21 PROCEDURE — 3079F DIAST BP 80-89 MM HG: CPT | Performed by: OBSTETRICS & GYNECOLOGY

## 2024-05-21 PROCEDURE — 2500000004 HC RX 250 GENERAL PHARMACY W/ HCPCS (ALT 636 FOR OP/ED): Mod: SE | Performed by: OBSTETRICS & GYNECOLOGY

## 2024-05-21 RX ORDER — NALTREXONE 380 MG
380 KIT INTRAMUSCULAR
Qty: 4 ML | Refills: 11 | Status: SHIPPED | OUTPATIENT
Start: 2024-05-21 | End: 2025-05-21

## 2024-05-21 RX ADMIN — NALTREXONE 380 MG: KIT at 16:36

## 2024-05-21 ASSESSMENT — ENCOUNTER SYMPTOMS
RESPIRATORY NEGATIVE: 0
ALLERGIC/IMMUNOLOGIC NEGATIVE: 0
EYES NEGATIVE: 0
PSYCHIATRIC NEGATIVE: 0
MUSCULOSKELETAL NEGATIVE: 0
HEMATOLOGIC/LYMPHATIC NEGATIVE: 0
ENDOCRINE NEGATIVE: 0
CONSTITUTIONAL NEGATIVE: 0
CARDIOVASCULAR NEGATIVE: 0
GASTROINTESTINAL NEGATIVE: 0
NEUROLOGICAL NEGATIVE: 0

## 2024-05-21 ASSESSMENT — EDINBURGH POSTNATAL DEPRESSION SCALE (EPDS)
TOTAL SCORE: 9
I HAVE LOOKED FORWARD WITH ENJOYMENT TO THINGS: AS MUCH AS I EVER DID
I HAVE FELT SAD OR MISERABLE: YES, QUITE OFTEN
THINGS HAVE BEEN GETTING ON TOP OF ME: NO, I HAVE BEEN COPING AS WELL AS EVER
I HAVE BEEN SO UNHAPPY THAT I HAVE BEEN CRYING: YES, QUITE OFTEN
I HAVE BLAMED MYSELF UNNECESSARILY WHEN THINGS WENT WRONG: NO, NEVER
I HAVE BEEN ANXIOUS OR WORRIED FOR NO GOOD REASON: YES, SOMETIMES
I HAVE BEEN SO UNHAPPY THAT I HAVE HAD DIFFICULTY SLEEPING: YES, MOST OF THE TIME
I HAVE BEEN ABLE TO LAUGH AND SEE THE FUNNY SIDE OF THINGS: AS MUCH AS I ALWAYS COULD
I HAVE FELT SCARED OR PANICKY FOR NO GOOD REASON: NO, NOT AT ALL
THE THOUGHT OF HARMING MYSELF HAS OCCURRED TO ME: NEVER

## 2024-05-21 ASSESSMENT — PAIN SCALES - GENERAL: PAINLEVEL: 0-NO PAIN

## 2024-05-21 NOTE — PROGRESS NOTES
"Assessment   AUD:   - OP (completed IOP) at [facility]  - Vivitrol IM #2 today. Risks, expected benefits, and potential side effects of treatment reviewed with patient. Pt would like to maintain treatment with [facility] at this time, though we discussed later it might make sense for her to transfer her care to [facility] as long as they can provide the same level of services.  - UDS appropriate . Repeat ethyl glucuronide sent today for treatment monitoring.     2. MADDI  - Self-discontinued Gabapentin/Zoloft, pt remains euthymic     3. CHTN, not previously on meds: BP at goal off antihypertensives     4. Pelvic pain resolved sp removal of Liletta (was self-displacing)  - Pt declines contraception at this time    5. Patient contemplative for tobacco cessation, declines interventions at this time    FU 1 month     Kathryn Dotson MD    Subjective   Arlen Braga  presenting for a postpartum visit with the [facility]. Delivered baby blayne Cueto at 38 weeks on 24 via LTCS for breech presentation with bradycardia during attempted ECV. Baby home with mother with DCFS following.      Preg c/b  CHTN: started on meds during delivery hospitalization, discharged on Nifed 30mg daily, was able to stop postpartum with maintenance of normotension  Mild AUD with admission 2023 for alcoholic pancreatitis. Started on Gabapentin antepartum and pt accepted naltrexone postpartum.   Tobacco Use: tried nicotine patch without success  MADDI: on gabapentin and Zoloft in third trimester     Interval Hx:  Things going well at [facility].   Endorses sobriety.  Graduating IOP this month, going into outpatient only care.  For 2nd dose of Vivitrol today.    Self-discontinued Zoloft and gabapentin 2/2 stomach cramps and drowsiness. Her mood is stable without medications, denies anxiety, denies SI/HI/AVH  Things going well with DCFS for baby girl \"Nory.\"    However, slightly increased use of cigarettes from 10 > 15 per day. " "Contemplative with NRT or Chantix but declined at this time.      Current Meds:  Naltrexone 50mg daily --> transitioned to Vivitrol     AUD Treatment:  - Appropriate UDS at delivery: UDS was + THC (no quant) and alcohol prior to delivery  - MAT: Vivitrol 380mg IM monthly  - Behavioral Treatment: AA twice weekly + IOP at [facility], graduating in June 2024    Objective   /81   Pulse 91   Ht 1.626 m (5' 4\")   Wt 57.4 kg (126 lb 8 oz)   LMP 05/21/2024 Comment: iud came out  Breastfeeding No   BMI 21.71 kg/m²      General:   Alert and oriented, in no acute distress   Neck: Supple. No visible thyromegaly.    Psych Normal affect. Normal mood.        "

## 2024-05-22 ENCOUNTER — APPOINTMENT (OUTPATIENT)
Dept: PRIMARY CARE | Facility: CLINIC | Age: 29
End: 2024-05-22
Payer: COMMERCIAL

## 2024-05-23 LAB — ETHYL GLUCURONIDE UR QL SCN: NEGATIVE NG/ML

## 2024-06-21 ENCOUNTER — APPOINTMENT (OUTPATIENT)
Dept: OBSTETRICS AND GYNECOLOGY | Facility: CLINIC | Age: 29
End: 2024-06-21
Payer: COMMERCIAL

## 2024-09-16 ENCOUNTER — APPOINTMENT (OUTPATIENT)
Dept: OBSTETRICS AND GYNECOLOGY | Facility: CLINIC | Age: 29
End: 2024-09-16
Payer: COMMERCIAL

## 2025-04-23 ENCOUNTER — APPOINTMENT (OUTPATIENT)
Dept: PRIMARY CARE | Facility: CLINIC | Age: 30
End: 2025-04-23
Payer: COMMERCIAL

## 2025-05-13 ENCOUNTER — SOCIAL WORK (OUTPATIENT)
Dept: OBSTETRICS AND GYNECOLOGY | Facility: CLINIC | Age: 30
End: 2025-05-13
Payer: COMMERCIAL

## 2025-06-06 ENCOUNTER — HOSPITAL ENCOUNTER (INPATIENT)
Facility: HOSPITAL | Age: 30
End: 2025-06-06
Attending: EMERGENCY MEDICINE | Admitting: STUDENT IN AN ORGANIZED HEALTH CARE EDUCATION/TRAINING PROGRAM
Payer: COMMERCIAL

## 2025-06-06 ENCOUNTER — APPOINTMENT (OUTPATIENT)
Dept: RADIOLOGY | Facility: HOSPITAL | Age: 30
End: 2025-06-06
Payer: COMMERCIAL

## 2025-06-06 DIAGNOSIS — K85.20 ALCOHOL-INDUCED ACUTE PANCREATITIS, UNSPECIFIED COMPLICATION STATUS (HHS-HCC): Primary | ICD-10-CM

## 2025-06-06 DIAGNOSIS — J45.20 MILD INTERMITTENT ASTHMA WITHOUT COMPLICATION (HHS-HCC): ICD-10-CM

## 2025-06-06 DIAGNOSIS — R10.12 LEFT UPPER QUADRANT ABDOMINAL PAIN: ICD-10-CM

## 2025-06-06 DIAGNOSIS — R11.2 NAUSEA AND VOMITING, UNSPECIFIED VOMITING TYPE: ICD-10-CM

## 2025-06-06 DIAGNOSIS — I10 PRIMARY HYPERTENSION: ICD-10-CM

## 2025-06-06 DIAGNOSIS — R10.2 PELVIC PAIN: ICD-10-CM

## 2025-06-06 LAB
ALBUMIN SERPL BCP-MCNC: 4.6 G/DL (ref 3.4–5)
ALP SERPL-CCNC: 98 U/L (ref 33–110)
ALT SERPL W P-5'-P-CCNC: 18 U/L (ref 7–45)
ANION GAP SERPL CALC-SCNC: 14 MMOL/L (ref 10–20)
APPEARANCE UR: CLEAR
AST SERPL W P-5'-P-CCNC: 21 U/L (ref 9–39)
BASOPHILS # BLD AUTO: 0.03 X10*3/UL (ref 0–0.1)
BASOPHILS NFR BLD AUTO: 0.5 %
BILIRUB SERPL-MCNC: 0.5 MG/DL (ref 0–1.2)
BILIRUB UR STRIP.AUTO-MCNC: NEGATIVE MG/DL
BUN SERPL-MCNC: 11 MG/DL (ref 6–23)
CALCIUM SERPL-MCNC: 9.5 MG/DL (ref 8.6–10.6)
CHLORIDE SERPL-SCNC: 103 MMOL/L (ref 98–107)
CO2 SERPL-SCNC: 25 MMOL/L (ref 21–32)
COLOR UR: YELLOW
CREAT SERPL-MCNC: 1.07 MG/DL (ref 0.5–1.05)
EGFRCR SERPLBLD CKD-EPI 2021: 72 ML/MIN/1.73M*2
EOSINOPHIL # BLD AUTO: 0.07 X10*3/UL (ref 0–0.7)
EOSINOPHIL NFR BLD AUTO: 1.1 %
ERYTHROCYTE [DISTWIDTH] IN BLOOD BY AUTOMATED COUNT: 12.8 % (ref 11.5–14.5)
GLUCOSE SERPL-MCNC: 105 MG/DL (ref 74–99)
GLUCOSE UR STRIP.AUTO-MCNC: NORMAL MG/DL
HCT VFR BLD AUTO: 39 % (ref 36–46)
HGB BLD-MCNC: 13.4 G/DL (ref 12–16)
IMM GRANULOCYTES # BLD AUTO: 0.01 X10*3/UL (ref 0–0.7)
IMM GRANULOCYTES NFR BLD AUTO: 0.2 % (ref 0–0.9)
KETONES UR STRIP.AUTO-MCNC: ABNORMAL MG/DL
LEUKOCYTE ESTERASE UR QL STRIP.AUTO: NEGATIVE
LIPASE SERPL-CCNC: 89 U/L (ref 9–82)
LYMPHOCYTES # BLD AUTO: 2.14 X10*3/UL (ref 1.2–4.8)
LYMPHOCYTES NFR BLD AUTO: 34.1 %
MCH RBC QN AUTO: 33.3 PG (ref 26–34)
MCHC RBC AUTO-ENTMCNC: 34.4 G/DL (ref 32–36)
MCV RBC AUTO: 97 FL (ref 80–100)
MONOCYTES # BLD AUTO: 0.35 X10*3/UL (ref 0.1–1)
MONOCYTES NFR BLD AUTO: 5.6 %
NEUTROPHILS # BLD AUTO: 3.68 X10*3/UL (ref 1.2–7.7)
NEUTROPHILS NFR BLD AUTO: 58.5 %
NITRITE UR QL STRIP.AUTO: NEGATIVE
NRBC BLD-RTO: 0 /100 WBCS (ref 0–0)
PH UR STRIP.AUTO: 6.5 [PH]
PLATELET # BLD AUTO: 212 X10*3/UL (ref 150–450)
POTASSIUM SERPL-SCNC: 3.5 MMOL/L (ref 3.5–5.3)
PREGNANCY TEST URINE, POC: NEGATIVE
PROT SERPL-MCNC: 7.3 G/DL (ref 6.4–8.2)
PROT UR STRIP.AUTO-MCNC: ABNORMAL MG/DL
RBC # BLD AUTO: 4.02 X10*6/UL (ref 4–5.2)
RBC # UR STRIP.AUTO: NEGATIVE MG/DL
RBC #/AREA URNS AUTO: NORMAL /HPF
SODIUM SERPL-SCNC: 138 MMOL/L (ref 136–145)
SP GR UR STRIP.AUTO: 1.05
SQUAMOUS #/AREA URNS AUTO: NORMAL /HPF
UROBILINOGEN UR STRIP.AUTO-MCNC: ABNORMAL MG/DL
WBC # BLD AUTO: 6.3 X10*3/UL (ref 4.4–11.3)
WBC #/AREA URNS AUTO: NORMAL /HPF

## 2025-06-06 PROCEDURE — 96375 TX/PRO/DX INJ NEW DRUG ADDON: CPT

## 2025-06-06 PROCEDURE — 83690 ASSAY OF LIPASE: CPT

## 2025-06-06 PROCEDURE — 2500000004 HC RX 250 GENERAL PHARMACY W/ HCPCS (ALT 636 FOR OP/ED): Mod: SE

## 2025-06-06 PROCEDURE — 99285 EMERGENCY DEPT VISIT HI MDM: CPT

## 2025-06-06 PROCEDURE — 99285 EMERGENCY DEPT VISIT HI MDM: CPT | Mod: 25 | Performed by: EMERGENCY MEDICINE

## 2025-06-06 PROCEDURE — 81025 URINE PREGNANCY TEST: CPT

## 2025-06-06 PROCEDURE — 2500000005 HC RX 250 GENERAL PHARMACY W/O HCPCS: Mod: SE

## 2025-06-06 PROCEDURE — 2500000001 HC RX 250 WO HCPCS SELF ADMINISTERED DRUGS (ALT 637 FOR MEDICARE OP): Mod: SE

## 2025-06-06 PROCEDURE — 80053 COMPREHEN METABOLIC PANEL: CPT

## 2025-06-06 PROCEDURE — 2550000001 HC RX 255 CONTRASTS: Mod: SE

## 2025-06-06 PROCEDURE — 81001 URINALYSIS AUTO W/SCOPE: CPT

## 2025-06-06 PROCEDURE — 96361 HYDRATE IV INFUSION ADD-ON: CPT

## 2025-06-06 PROCEDURE — 85025 COMPLETE CBC W/AUTO DIFF WBC: CPT

## 2025-06-06 PROCEDURE — 74177 CT ABD & PELVIS W/CONTRAST: CPT | Performed by: RADIOLOGY

## 2025-06-06 PROCEDURE — 74177 CT ABD & PELVIS W/CONTRAST: CPT

## 2025-06-06 PROCEDURE — 1100000001 HC PRIVATE ROOM DAILY

## 2025-06-06 PROCEDURE — 2500000002 HC RX 250 W HCPCS SELF ADMINISTERED DRUGS (ALT 637 FOR MEDICARE OP, ALT 636 FOR OP/ED): Mod: SE

## 2025-06-06 PROCEDURE — 96374 THER/PROPH/DIAG INJ IV PUSH: CPT

## 2025-06-06 PROCEDURE — 36415 COLL VENOUS BLD VENIPUNCTURE: CPT

## 2025-06-06 RX ORDER — ONDANSETRON HYDROCHLORIDE 2 MG/ML
4 INJECTION, SOLUTION INTRAVENOUS ONCE
Status: COMPLETED | OUTPATIENT
Start: 2025-06-06 | End: 2025-06-06

## 2025-06-06 RX ORDER — ONDANSETRON 4 MG/1
4 TABLET, FILM COATED ORAL EVERY 8 HOURS PRN
Status: ACTIVE | OUTPATIENT
Start: 2025-06-06

## 2025-06-06 RX ORDER — ACETAMINOPHEN 160 MG/5ML
650 SOLUTION ORAL EVERY 4 HOURS PRN
Status: ACTIVE | OUTPATIENT
Start: 2025-06-06

## 2025-06-06 RX ORDER — ACETAMINOPHEN 650 MG/1
650 SUPPOSITORY RECTAL EVERY 4 HOURS PRN
Status: DISPENSED | OUTPATIENT
Start: 2025-06-06

## 2025-06-06 RX ORDER — ACETAMINOPHEN 325 MG/1
650 TABLET ORAL EVERY 4 HOURS PRN
Status: ACTIVE | OUTPATIENT
Start: 2025-06-06

## 2025-06-06 RX ORDER — ENOXAPARIN SODIUM 100 MG/ML
40 INJECTION SUBCUTANEOUS EVERY 24 HOURS
Status: DISPENSED | OUTPATIENT
Start: 2025-06-06

## 2025-06-06 RX ORDER — ONDANSETRON HYDROCHLORIDE 2 MG/ML
4 INJECTION, SOLUTION INTRAVENOUS EVERY 8 HOURS PRN
Status: DISPENSED | OUTPATIENT
Start: 2025-06-06

## 2025-06-06 RX ORDER — MORPHINE SULFATE 4 MG/ML
4 INJECTION INTRAVENOUS ONCE
Status: COMPLETED | OUTPATIENT
Start: 2025-06-06 | End: 2025-06-06

## 2025-06-06 RX ORDER — KETOROLAC TROMETHAMINE 15 MG/ML
15 INJECTION, SOLUTION INTRAMUSCULAR; INTRAVENOUS ONCE
Status: COMPLETED | OUTPATIENT
Start: 2025-06-06 | End: 2025-06-06

## 2025-06-06 RX ORDER — THIAMINE HYDROCHLORIDE 100 MG/ML
100 INJECTION, SOLUTION INTRAMUSCULAR; INTRAVENOUS EVERY 24 HOURS
Status: COMPLETED | OUTPATIENT
Start: 2025-06-06 | End: 2025-06-08

## 2025-06-06 RX ORDER — LORAZEPAM 2 MG/ML
0.5 INJECTION INTRAMUSCULAR EVERY 2 HOUR PRN
Status: ACTIVE | OUTPATIENT
Start: 2025-06-06

## 2025-06-06 RX ORDER — LORAZEPAM 2 MG/ML
2 INJECTION INTRAMUSCULAR EVERY 2 HOUR PRN
Status: ACTIVE | OUTPATIENT
Start: 2025-06-06

## 2025-06-06 RX ORDER — FOLIC ACID 1 MG/1
1 TABLET ORAL DAILY
Status: DISPENSED | OUTPATIENT
Start: 2025-06-07

## 2025-06-06 RX ORDER — MULTIVIT-MIN/IRON FUM/FOLIC AC 7.5 MG-4
1 TABLET ORAL DAILY
Status: DISPENSED | OUTPATIENT
Start: 2025-06-07

## 2025-06-06 RX ORDER — OXYCODONE HYDROCHLORIDE 5 MG/1
5 TABLET ORAL EVERY 6 HOURS PRN
Refills: 0 | Status: DISPENSED | OUTPATIENT
Start: 2025-06-06

## 2025-06-06 RX ORDER — LORAZEPAM 2 MG/ML
1 INJECTION INTRAMUSCULAR EVERY 2 HOUR PRN
Status: ACTIVE | OUTPATIENT
Start: 2025-06-06

## 2025-06-06 RX ORDER — LANOLIN ALCOHOL/MO/W.PET/CERES
100 CREAM (GRAM) TOPICAL DAILY
Status: ACTIVE | OUTPATIENT
Start: 2025-06-09

## 2025-06-06 RX ADMIN — LIDOCAINE HYDROCHLORIDE 10 ML: 20 SOLUTION ORAL; TOPICAL at 17:18

## 2025-06-06 RX ADMIN — THIAMINE HYDROCHLORIDE 100 MG: 100 INJECTION, SOLUTION INTRAMUSCULAR; INTRAVENOUS at 21:54

## 2025-06-06 RX ADMIN — SODIUM CHLORIDE, SODIUM LACTATE, POTASSIUM CHLORIDE, AND CALCIUM CHLORIDE 1000 ML: .6; .31; .03; .02 INJECTION, SOLUTION INTRAVENOUS at 20:47

## 2025-06-06 RX ADMIN — MORPHINE SULFATE 4 MG: 4 INJECTION INTRAVENOUS at 19:25

## 2025-06-06 RX ADMIN — SODIUM CHLORIDE, SODIUM LACTATE, POTASSIUM CHLORIDE, AND CALCIUM CHLORIDE 1000 ML: .6; .31; .03; .02 INJECTION, SOLUTION INTRAVENOUS at 16:04

## 2025-06-06 RX ADMIN — KETOROLAC TROMETHAMINE 15 MG: 15 INJECTION, SOLUTION INTRAMUSCULAR; INTRAVENOUS at 16:05

## 2025-06-06 RX ADMIN — ONDANSETRON 4 MG: 2 INJECTION INTRAMUSCULAR; INTRAVENOUS at 16:05

## 2025-06-06 RX ADMIN — SODIUM CHLORIDE, SODIUM LACTATE, POTASSIUM CHLORIDE, AND CALCIUM CHLORIDE 1000 ML: .6; .31; .03; .02 INJECTION, SOLUTION INTRAVENOUS at 21:56

## 2025-06-06 RX ADMIN — IOHEXOL 75 ML: 350 INJECTION, SOLUTION INTRAVENOUS at 17:31

## 2025-06-06 SDOH — ECONOMIC STABILITY: HOUSING INSECURITY: IN THE LAST 12 MONTHS, WAS THERE A TIME WHEN YOU WERE NOT ABLE TO PAY THE MORTGAGE OR RENT ON TIME?: PATIENT DECLINED

## 2025-06-06 SDOH — SOCIAL STABILITY: SOCIAL INSECURITY: ARE THERE ANY APPARENT SIGNS OF INJURIES/BEHAVIORS THAT COULD BE RELATED TO ABUSE/NEGLECT?: NO

## 2025-06-06 SDOH — ECONOMIC STABILITY: FOOD INSECURITY
WITHIN THE PAST 12 MONTHS, YOU WORRIED THAT YOUR FOOD WOULD RUN OUT BEFORE YOU GOT THE MONEY TO BUY MORE.: PATIENT DECLINED

## 2025-06-06 SDOH — SOCIAL STABILITY: SOCIAL INSECURITY: HAVE YOU HAD ANY THOUGHTS OF HARMING ANYONE ELSE?: NO

## 2025-06-06 SDOH — SOCIAL STABILITY: SOCIAL INSECURITY: HAVE YOU HAD THOUGHTS OF HARMING ANYONE ELSE?: NO

## 2025-06-06 SDOH — ECONOMIC STABILITY: INCOME INSECURITY
IN THE PAST 12 MONTHS HAS THE ELECTRIC, GAS, OIL, OR WATER COMPANY THREATENED TO SHUT OFF SERVICES IN YOUR HOME?: PATIENT DECLINED

## 2025-06-06 SDOH — HEALTH STABILITY: PHYSICAL HEALTH: ON AVERAGE, HOW MANY DAYS PER WEEK DO YOU ENGAGE IN MODERATE TO STRENUOUS EXERCISE (LIKE A BRISK WALK)?: 0 DAYS

## 2025-06-06 SDOH — ECONOMIC STABILITY: FOOD INSECURITY: WITHIN THE PAST 12 MONTHS, THE FOOD YOU BOUGHT JUST DIDN'T LAST AND YOU DIDN'T HAVE MONEY TO GET MORE.: PATIENT DECLINED

## 2025-06-06 SDOH — SOCIAL STABILITY: SOCIAL NETWORK: HOW OFTEN DO YOU ATTEND MEETINGS OF THE CLUBS OR ORGANIZATIONS YOU BELONG TO?: PATIENT DECLINED

## 2025-06-06 SDOH — HEALTH STABILITY: MENTAL HEALTH
DO YOU FEEL STRESS - TENSE, RESTLESS, NERVOUS, OR ANXIOUS, OR UNABLE TO SLEEP AT NIGHT BECAUSE YOUR MIND IS TROUBLED ALL THE TIME - THESE DAYS?: PATIENT DECLINED

## 2025-06-06 SDOH — ECONOMIC STABILITY: FOOD INSECURITY: HOW HARD IS IT FOR YOU TO PAY FOR THE VERY BASICS LIKE FOOD, HOUSING, MEDICAL CARE, AND HEATING?: PATIENT DECLINED

## 2025-06-06 SDOH — SOCIAL STABILITY: SOCIAL NETWORK
DO YOU BELONG TO ANY CLUBS OR ORGANIZATIONS SUCH AS CHURCH GROUPS, UNIONS, FRATERNAL OR ATHLETIC GROUPS, OR SCHOOL GROUPS?: PATIENT DECLINED

## 2025-06-06 SDOH — HEALTH STABILITY: PHYSICAL HEALTH
HOW OFTEN DO YOU NEED TO HAVE SOMEONE HELP YOU WHEN YOU READ INSTRUCTIONS, PAMPHLETS, OR OTHER WRITTEN MATERIAL FROM YOUR DOCTOR OR PHARMACY?: NEVER

## 2025-06-06 SDOH — ECONOMIC STABILITY: TRANSPORTATION INSECURITY
IN THE PAST 12 MONTHS, HAS LACK OF TRANSPORTATION KEPT YOU FROM MEDICAL APPOINTMENTS OR FROM GETTING MEDICATIONS?: PATIENT DECLINED

## 2025-06-06 SDOH — SOCIAL STABILITY: SOCIAL NETWORK: HOW OFTEN DO YOU ATTEND CHURCH OR RELIGIOUS SERVICES?: PATIENT DECLINED

## 2025-06-06 SDOH — ECONOMIC STABILITY: HOUSING INSECURITY: AT ANY TIME IN THE PAST 12 MONTHS, WERE YOU HOMELESS OR LIVING IN A SHELTER (INCLUDING NOW)?: PATIENT DECLINED

## 2025-06-06 SDOH — SOCIAL STABILITY: SOCIAL NETWORK: IN A TYPICAL WEEK, HOW MANY TIMES DO YOU TALK ON THE PHONE WITH FAMILY, FRIENDS, OR NEIGHBORS?: PATIENT DECLINED

## 2025-06-06 SDOH — SOCIAL STABILITY: SOCIAL NETWORK: HOW OFTEN DO YOU GET TOGETHER WITH FRIENDS OR RELATIVES?: PATIENT DECLINED

## 2025-06-06 SDOH — ECONOMIC STABILITY: HOUSING INSECURITY: IN THE PAST 12 MONTHS, HOW MANY TIMES HAVE YOU MOVED WHERE YOU WERE LIVING?: 1

## 2025-06-06 SDOH — SOCIAL STABILITY: SOCIAL INSECURITY
WITHIN THE LAST YEAR, HAVE YOU BEEN KICKED, HIT, SLAPPED, OR OTHERWISE PHYSICALLY HURT BY YOUR PARTNER OR EX-PARTNER?: PATIENT DECLINED

## 2025-06-06 SDOH — SOCIAL STABILITY: SOCIAL INSECURITY: HAS ANYONE EVER THREATENED TO HURT YOUR FAMILY OR YOUR PETS?: NO

## 2025-06-06 SDOH — SOCIAL STABILITY: SOCIAL INSECURITY
WITHIN THE LAST YEAR, HAVE YOU BEEN HUMILIATED OR EMOTIONALLY ABUSED IN OTHER WAYS BY YOUR PARTNER OR EX-PARTNER?: PATIENT DECLINED

## 2025-06-06 SDOH — SOCIAL STABILITY: SOCIAL INSECURITY: DOES ANYONE TRY TO KEEP YOU FROM HAVING/CONTACTING OTHER FRIENDS OR DOING THINGS OUTSIDE YOUR HOME?: NO

## 2025-06-06 SDOH — HEALTH STABILITY: PHYSICAL HEALTH: ON AVERAGE, HOW MANY MINUTES DO YOU ENGAGE IN EXERCISE AT THIS LEVEL?: 0 MIN

## 2025-06-06 SDOH — SOCIAL STABILITY: SOCIAL INSECURITY
WITHIN THE LAST YEAR, HAVE YOU BEEN RAPED OR FORCED TO HAVE ANY KIND OF SEXUAL ACTIVITY BY YOUR PARTNER OR EX-PARTNER?: PATIENT DECLINED

## 2025-06-06 SDOH — SOCIAL STABILITY: SOCIAL INSECURITY: ARE YOU MARRIED, WIDOWED, DIVORCED, SEPARATED, NEVER MARRIED, OR LIVING WITH A PARTNER?: PATIENT DECLINED

## 2025-06-06 SDOH — SOCIAL STABILITY: SOCIAL INSECURITY: ABUSE: ADULT

## 2025-06-06 SDOH — SOCIAL STABILITY: SOCIAL INSECURITY: WITHIN THE LAST YEAR, HAVE YOU BEEN AFRAID OF YOUR PARTNER OR EX-PARTNER?: PATIENT DECLINED

## 2025-06-06 SDOH — SOCIAL STABILITY: SOCIAL INSECURITY: DO YOU FEEL UNSAFE GOING BACK TO THE PLACE WHERE YOU ARE LIVING?: NO

## 2025-06-06 SDOH — SOCIAL STABILITY: SOCIAL INSECURITY: WERE YOU ABLE TO COMPLETE ALL THE BEHAVIORAL HEALTH SCREENINGS?: YES

## 2025-06-06 SDOH — SOCIAL STABILITY: SOCIAL INSECURITY: DO YOU FEEL ANYONE HAS EXPLOITED OR TAKEN ADVANTAGE OF YOU FINANCIALLY OR OF YOUR PERSONAL PROPERTY?: NO

## 2025-06-06 SDOH — SOCIAL STABILITY: SOCIAL INSECURITY: ARE YOU OR HAVE YOU BEEN THREATENED OR ABUSED PHYSICALLY, EMOTIONALLY, OR SEXUALLY BY ANYONE?: NO

## 2025-06-06 ASSESSMENT — LIFESTYLE VARIABLES
TREMOR: NO TREMOR
PAROXYSMAL SWEATS: NO SWEAT VISIBLE
TREMOR: NO TREMOR
AUDIT-C TOTAL SCORE: -1
HEADACHE, FULLNESS IN HEAD: NOT PRESENT
AUDITORY DISTURBANCES: NOT PRESENT
TOTAL SCORE: 1
ORIENTATION AND CLOUDING OF SENSORIUM: ORIENTED AND CAN DO SERIAL ADDITIONS
HEADACHE, FULLNESS IN HEAD: NOT PRESENT
HOW MANY STANDARD DRINKS CONTAINING ALCOHOL DO YOU HAVE ON A TYPICAL DAY: 3 OR 4
AUDITORY DISTURBANCES: NOT PRESENT
TOTAL SCORE: 3
PAROXYSMAL SWEATS: NO SWEAT VISIBLE
VISUAL DISTURBANCES: NOT PRESENT
ANXIETY: MILDLY ANXIOUS
AUDIT-C TOTAL SCORE: -1
AGITATION: NORMAL ACTIVITY
NAUSEA AND VOMITING: 2
SKIP TO QUESTIONS 9-10: 0
HOW OFTEN DO YOU HAVE A DRINK CONTAINING ALCOHOL: 4 OR MORE TIMES A WEEK
NAUSEA AND VOMITING: NO NAUSEA AND NO VOMITING
ANXIETY: MILDLY ANXIOUS
AGITATION: NORMAL ACTIVITY
HOW OFTEN DO YOU HAVE 6 OR MORE DRINKS ON ONE OCCASION: PATIENT DECLINED
ORIENTATION AND CLOUDING OF SENSORIUM: ORIENTED AND CAN DO SERIAL ADDITIONS
VISUAL DISTURBANCES: NOT PRESENT

## 2025-06-06 ASSESSMENT — COGNITIVE AND FUNCTIONAL STATUS - GENERAL
PATIENT BASELINE BEDBOUND: NO
DAILY ACTIVITIY SCORE: 24
MOBILITY SCORE: 24

## 2025-06-06 ASSESSMENT — ACTIVITIES OF DAILY LIVING (ADL)
HEARING - RIGHT EAR: FUNCTIONAL
WALKS IN HOME: INDEPENDENT
GROOMING: INDEPENDENT
DRESSING YOURSELF: INDEPENDENT
PATIENT'S MEMORY ADEQUATE TO SAFELY COMPLETE DAILY ACTIVITIES?: YES
TOILETING: INDEPENDENT
ADEQUATE_TO_COMPLETE_ADL: YES
BATHING: INDEPENDENT
FEEDING YOURSELF: INDEPENDENT
LACK_OF_TRANSPORTATION: PATIENT DECLINED
HEARING - LEFT EAR: FUNCTIONAL
JUDGMENT_ADEQUATE_SAFELY_COMPLETE_DAILY_ACTIVITIES: YES

## 2025-06-06 ASSESSMENT — PAIN - FUNCTIONAL ASSESSMENT
PAIN_FUNCTIONAL_ASSESSMENT: 0-10

## 2025-06-06 ASSESSMENT — COLUMBIA-SUICIDE SEVERITY RATING SCALE - C-SSRS
6. HAVE YOU EVER DONE ANYTHING, STARTED TO DO ANYTHING, OR PREPARED TO DO ANYTHING TO END YOUR LIFE?: NO
2. HAVE YOU ACTUALLY HAD ANY THOUGHTS OF KILLING YOURSELF?: NO
1. IN THE PAST MONTH, HAVE YOU WISHED YOU WERE DEAD OR WISHED YOU COULD GO TO SLEEP AND NOT WAKE UP?: NO

## 2025-06-06 ASSESSMENT — PAIN SCALES - GENERAL
PAINLEVEL_OUTOF10: 3
PAINLEVEL_OUTOF10: 10 - WORST POSSIBLE PAIN
PAINLEVEL_OUTOF10: 8

## 2025-06-06 ASSESSMENT — PAIN DESCRIPTION - LOCATION
LOCATION: ABDOMEN
LOCATION: ABDOMEN

## 2025-06-06 ASSESSMENT — PAIN DESCRIPTION - PAIN TYPE: TYPE: ACUTE PAIN

## 2025-06-06 ASSESSMENT — PAIN DESCRIPTION - ORIENTATION: ORIENTATION: LOWER

## 2025-06-06 NOTE — ED TRIAGE NOTES
"Pt presented to ED for c/o abd pain and nausea. Possible pregnancy but says \"I'm not pregnant tho\"  "

## 2025-06-06 NOTE — ED PROVIDER NOTES
HPI   Chief Complaint   Patient presents with    Nausea    Abdominal Pain       Patient is a 29-year-old female with PMH of alcoholic pancreatitis, asthma, HTN presenting today for abdominal pain.  Abdominal pain started yesterday.  Described as left upper quadrant abdominal pain radiating to her back.  States that the pain is a crushing pain that is constant.  Denies any other radiation with abdominal pain.  Endorsing nausea and emesis.  No lower abdominal pain.  No chance of pregnancy.  Denies dysuria, hematuria, increased urinary frequency.  Denies fevers though endorses subjective chills.  States that she does drink alcohol daily.  Notes that she drinks 2 to 4 12 ounce seltzers daily.  States that she has had pancreatitis in the past and the pain does feel similar to this.  She does state that whenever she drinks cold fluids, the abdominal pain worsens.  States that room temperature fluids do not help.  Has had decreased appetite so she has not eaten anything.              Patient History   Medical History[1]  Surgical History[2]  Family History[3]  Social History[4]    Physical Exam   ED Triage Vitals [06/06/25 1459]   Temperature Heart Rate Respirations BP   36.9 °C (98.5 °F) 88 16 133/86      Pulse Ox Temp Source Heart Rate Source Patient Position   99 % Temporal -- --      BP Location FiO2 (%)     -- --       Physical Exam  Vitals and nursing note reviewed.   Constitutional:       General: She is not in acute distress.     Appearance: Normal appearance. She is not ill-appearing.   HENT:      Head: Normocephalic and atraumatic.      Right Ear: External ear normal.      Left Ear: External ear normal.      Nose: Nose normal.      Mouth/Throat:      Mouth: Mucous membranes are moist.   Eyes:      Extraocular Movements: Extraocular movements intact.      Conjunctiva/sclera: Conjunctivae normal.      Pupils: Pupils are equal, round, and reactive to light.   Cardiovascular:      Rate and Rhythm: Normal rate and  regular rhythm.      Heart sounds: Normal heart sounds.   Pulmonary:      Effort: No accessory muscle usage or respiratory distress.      Breath sounds: Normal breath sounds. No wheezing, rhonchi or rales.   Abdominal:      General: Abdomen is flat. Bowel sounds are normal. There is no distension.      Palpations: Abdomen is soft.      Tenderness: There is abdominal tenderness in the epigastric area and left upper quadrant. There is left CVA tenderness. There is no right CVA tenderness.   Musculoskeletal:         General: No swelling or deformity. Normal range of motion.      Cervical back: Normal range of motion and neck supple.      Right lower leg: No edema.      Left lower leg: No edema.   Skin:     General: Skin is warm and dry.      Capillary Refill: Capillary refill takes less than 2 seconds.   Neurological:      General: No focal deficit present.      Mental Status: She is alert and oriented to person, place, and time.      GCS: GCS eye subscore is 4. GCS verbal subscore is 5. GCS motor subscore is 6.      Cranial Nerves: Cranial nerves 2-12 are intact.      Sensory: No sensory deficit.      Motor: Motor function is intact. No weakness.   Psychiatric:         Mood and Affect: Mood and affect normal.         Speech: Speech normal.         Behavior: Behavior normal. Behavior is cooperative.           ED Course & MDM   ED Course as of 06/06/25 1908 Fri Jun 06, 2025   1605 Preg Test, Ur: Negative [ML]   1841 CT abdomen pelvis w IV contrast  . There is edema of the body and tail of the pancreas with  surrounding fat stranding. There is normal enhancement of the  pancreas. Findings are consistent with acute interstitial  pancreatitis. Within the mesentery inferior to the pancreas, there is  an area of fat stranding and multiple prominent lymph nodes within  the mesentery. These findings are favored to be reactive to the  adjacent pancreatitis. However, recommend repeat imaging after  appropriate treatment to  document resolution.  2. The gallbladder is mildly distended with a small amount of  layering biliary sludge. No findings to suggest acute cholecystitis  such as gallbladder wall thickening or surrounding pericholecystic  fluid.   [ML]      ED Course User Index  [ML] Cecily Finn PA-C         Diagnoses as of 06/06/25 1908   Left upper quadrant abdominal pain   Nausea and vomiting, unspecified vomiting type   Alcohol-induced acute pancreatitis, unspecified complication status ([facility])                 No data recorded     Belkys Coma Scale Score: 15 (06/06/25 1459 : Amish Sheppard, RN)                           Medical Decision Making  29-year-old female presenting today for abdominal pain.  Described as epigastric/left upper quadrant abdominal pain that radiates to her back.  On exam, she does have tenderness to the epigastric and left upper quadrant.  She has some mild CVA tenderness.  States that she has been experiencing nausea vomiting since yesterday.  Is a daily alcohol user.  She states that she has had pancreatitis in the past and it feels similar to this.  Also notes that when she drinks cold water, it does upset her stomach make the pain worsen.  Will plan for abdominal labs, lipase, CT abdomen pelvis.  Differentials include pancreatitis versus gastritis versus PUD versus nephrolithiasis versus UTI.  Denies any urinary symptoms.  Will give patient IV fluids, Toradol, Zofran.  Once patient is able to tolerate p.o., I will give her a GI cocktail to evaluate if it helps relieve her symptoms if it is gastritis.    CBC without white count.  CMP showing no electrolyte abnormalities, MARIO, transaminitis.  Lipase is 89.  UA negative for infection.  Pregnancy test negative.  Wet read of CT revealing findings consistent with pancreatitis without complication.  There are surrounding areas of fat stranding and lymph nodes that radiology recommends repeat imaging after appropriate treatment to document resolution.   Some gallbladder sludge without evidence of cholecystitis.      On reevaluation of patient, she states that the Toradol did dramatically help her pain though it is creeping back up.  She has not had any episodes of emesis.  She does also note that the BMX did help with her pain and states that it did temporarily resolve it.  There may be some associated gastritis from her alcohol use as well and will initiate an antiacid.  Discussed with patient that she is able to tolerate p.o. and pain is controllable, she can be managed with her pancreatitis outpatient.  Did discuss the possibility of admission if her pain is not under control.    At the end of my shift, patient's care has been transferred to [facility] pending final read of CT, patient's p.o. challenge and reevaluation of her symptoms.        Procedure  Procedures       [1]   Past Medical History:  Diagnosis Date    Anemia     Depression    [2]   Past Surgical History:  Procedure Laterality Date    WISDOM TOOTH EXTRACTION Bilateral     pt unsure when   [3] No family history on file.  [4]   Social History  Tobacco Use    Smoking status: Every Day     Current packs/day: 1.00     Types: Cigarettes    Smokeless tobacco: Never    Tobacco comments:     Pt states she is currently in a program to quit. Does not want smoking cessation information at this time   Vaping Use    Vaping status: Never Used   Substance Use Topics    Alcohol use: Yes     Alcohol/week: 4.0 standard drinks of alcohol     Types: 4 Standard drinks or equivalent per week     Comment: adair sun    Drug use: Yes     Types: Marijuana        Cecily Finn PA-C  06/06/25 0023

## 2025-06-07 LAB
ALBUMIN SERPL BCP-MCNC: 3.6 G/DL (ref 3.4–5)
ALP SERPL-CCNC: 78 U/L (ref 33–110)
ALT SERPL W P-5'-P-CCNC: 14 U/L (ref 7–45)
ANION GAP SERPL CALC-SCNC: 10 MMOL/L (ref 10–20)
AST SERPL W P-5'-P-CCNC: 19 U/L (ref 9–39)
BASOPHILS # BLD AUTO: 0.02 X10*3/UL (ref 0–0.1)
BASOPHILS NFR BLD AUTO: 0.4 %
BILIRUB SERPL-MCNC: 0.5 MG/DL (ref 0–1.2)
BUN SERPL-MCNC: 10 MG/DL (ref 6–23)
CALCIUM SERPL-MCNC: 8.5 MG/DL (ref 8.6–10.6)
CHLORIDE SERPL-SCNC: 104 MMOL/L (ref 98–107)
CO2 SERPL-SCNC: 28 MMOL/L (ref 21–32)
CREAT SERPL-MCNC: 0.84 MG/DL (ref 0.5–1.05)
EGFRCR SERPLBLD CKD-EPI 2021: >90 ML/MIN/1.73M*2
EOSINOPHIL # BLD AUTO: 0.14 X10*3/UL (ref 0–0.7)
EOSINOPHIL NFR BLD AUTO: 2.9 %
ERYTHROCYTE [DISTWIDTH] IN BLOOD BY AUTOMATED COUNT: 12.9 % (ref 11.5–14.5)
GLUCOSE SERPL-MCNC: 90 MG/DL (ref 74–99)
HCT VFR BLD AUTO: 36.5 % (ref 36–46)
HGB BLD-MCNC: 12.1 G/DL (ref 12–16)
HOLD SPECIMEN: NORMAL
IMM GRANULOCYTES # BLD AUTO: 0.01 X10*3/UL (ref 0–0.7)
IMM GRANULOCYTES NFR BLD AUTO: 0.2 % (ref 0–0.9)
LYMPHOCYTES # BLD AUTO: 2.75 X10*3/UL (ref 1.2–4.8)
LYMPHOCYTES NFR BLD AUTO: 57.9 %
MCH RBC QN AUTO: 33.2 PG (ref 26–34)
MCHC RBC AUTO-ENTMCNC: 33.2 G/DL (ref 32–36)
MCV RBC AUTO: 100 FL (ref 80–100)
MONOCYTES # BLD AUTO: 0.31 X10*3/UL (ref 0.1–1)
MONOCYTES NFR BLD AUTO: 6.5 %
NEUTROPHILS # BLD AUTO: 1.52 X10*3/UL (ref 1.2–7.7)
NEUTROPHILS NFR BLD AUTO: 32.1 %
NRBC BLD-RTO: 0 /100 WBCS (ref 0–0)
PLATELET # BLD AUTO: 178 X10*3/UL (ref 150–450)
POTASSIUM SERPL-SCNC: 3.5 MMOL/L (ref 3.5–5.3)
PROT SERPL-MCNC: 5.8 G/DL (ref 6.4–8.2)
RBC # BLD AUTO: 3.65 X10*6/UL (ref 4–5.2)
SODIUM SERPL-SCNC: 138 MMOL/L (ref 136–145)
WBC # BLD AUTO: 4.8 X10*3/UL (ref 4.4–11.3)

## 2025-06-07 PROCEDURE — 2500000004 HC RX 250 GENERAL PHARMACY W/ HCPCS (ALT 636 FOR OP/ED): Mod: SE

## 2025-06-07 PROCEDURE — 2500000001 HC RX 250 WO HCPCS SELF ADMINISTERED DRUGS (ALT 637 FOR MEDICARE OP): Mod: SE | Performed by: INTERNAL MEDICINE

## 2025-06-07 PROCEDURE — 85025 COMPLETE CBC W/AUTO DIFF WBC: CPT

## 2025-06-07 PROCEDURE — 36415 COLL VENOUS BLD VENIPUNCTURE: CPT

## 2025-06-07 PROCEDURE — 2500000001 HC RX 250 WO HCPCS SELF ADMINISTERED DRUGS (ALT 637 FOR MEDICARE OP): Mod: SE

## 2025-06-07 PROCEDURE — 99233 SBSQ HOSP IP/OBS HIGH 50: CPT | Performed by: INTERNAL MEDICINE

## 2025-06-07 PROCEDURE — 84075 ASSAY ALKALINE PHOSPHATASE: CPT

## 2025-06-07 PROCEDURE — 2500000004 HC RX 250 GENERAL PHARMACY W/ HCPCS (ALT 636 FOR OP/ED): Mod: SE | Performed by: INTERNAL MEDICINE

## 2025-06-07 PROCEDURE — 1100000001 HC PRIVATE ROOM DAILY

## 2025-06-07 RX ORDER — DEXTROSE, SODIUM CHLORIDE, SODIUM LACTATE, POTASSIUM CHLORIDE, AND CALCIUM CHLORIDE 5; .6; .31; .03; .02 G/100ML; G/100ML; G/100ML; G/100ML; G/100ML
75 INJECTION, SOLUTION INTRAVENOUS CONTINUOUS
Status: DISPENSED | OUTPATIENT
Start: 2025-06-07 | End: 2025-06-08

## 2025-06-07 RX ORDER — HYDRALAZINE HYDROCHLORIDE 25 MG/1
25 TABLET, FILM COATED ORAL ONCE
Status: COMPLETED | OUTPATIENT
Start: 2025-06-07 | End: 2025-06-07

## 2025-06-07 RX ORDER — HYDRALAZINE HYDROCHLORIDE 25 MG/1
25 TABLET, FILM COATED ORAL 2 TIMES DAILY
Status: DISCONTINUED | OUTPATIENT
Start: 2025-06-07 | End: 2025-06-07

## 2025-06-07 RX ORDER — CLONIDINE HYDROCHLORIDE 0.1 MG/1
0.1 TABLET ORAL ONCE
Status: DISCONTINUED | OUTPATIENT
Start: 2025-06-07 | End: 2025-06-07

## 2025-06-07 RX ORDER — HYDRALAZINE HYDROCHLORIDE 25 MG/1
25 TABLET, FILM COATED ORAL 2 TIMES DAILY
Status: DISPENSED | OUTPATIENT
Start: 2025-06-08

## 2025-06-07 RX ADMIN — Medication 1 TABLET: at 08:40

## 2025-06-07 RX ADMIN — HYDRALAZINE HYDROCHLORIDE 25 MG: 25 TABLET ORAL at 18:31

## 2025-06-07 RX ADMIN — HYDROMORPHONE HYDROCHLORIDE 0.2 MG: 1 INJECTION, SOLUTION INTRAMUSCULAR; INTRAVENOUS; SUBCUTANEOUS at 09:09

## 2025-06-07 RX ADMIN — FOLIC ACID 1 MG: 1 TABLET ORAL at 08:40

## 2025-06-07 RX ADMIN — DEXTROSE, SODIUM CHLORIDE, SODIUM LACTATE, POTASSIUM CHLORIDE, AND CALCIUM CHLORIDE 150 ML/HR: 5; .6; .31; .03; .02 INJECTION, SOLUTION INTRAVENOUS at 14:43

## 2025-06-07 RX ADMIN — OXYCODONE 5 MG: 5 TABLET ORAL at 00:11

## 2025-06-07 RX ADMIN — THIAMINE HYDROCHLORIDE 100 MG: 100 INJECTION, SOLUTION INTRAMUSCULAR; INTRAVENOUS at 20:03

## 2025-06-07 RX ADMIN — OXYCODONE 5 MG: 5 TABLET ORAL at 06:12

## 2025-06-07 RX ADMIN — OXYCODONE 5 MG: 5 TABLET ORAL at 21:18

## 2025-06-07 RX ADMIN — DEXTROSE, SODIUM CHLORIDE, SODIUM LACTATE, POTASSIUM CHLORIDE, AND CALCIUM CHLORIDE 150 ML/HR: 5; .6; .31; .03; .02 INJECTION, SOLUTION INTRAVENOUS at 21:21

## 2025-06-07 RX ADMIN — ONDANSETRON 4 MG: 2 INJECTION INTRAMUSCULAR; INTRAVENOUS at 15:42

## 2025-06-07 RX ADMIN — OXYCODONE 5 MG: 5 TABLET ORAL at 13:07

## 2025-06-07 ASSESSMENT — LIFESTYLE VARIABLES
PAROXYSMAL SWEATS: NO SWEAT VISIBLE
AGITATION: NORMAL ACTIVITY
AUDITORY DISTURBANCES: NOT PRESENT
TOTAL SCORE: 0
TREMOR: NO TREMOR
AGITATION: NORMAL ACTIVITY
BLOOD PRESSURE: 123/81
HEADACHE, FULLNESS IN HEAD: NOT PRESENT
TREMOR: NO TREMOR
ANXIETY: MILDLY ANXIOUS
ORIENTATION AND CLOUDING OF SENSORIUM: ORIENTED AND CAN DO SERIAL ADDITIONS
TOTAL SCORE: 1
TREMOR: NO TREMOR
NAUSEA AND VOMITING: NO NAUSEA AND NO VOMITING
ANXIETY: NO ANXIETY, AT EASE
ANXIETY: NO ANXIETY, AT EASE
PAROXYSMAL SWEATS: NO SWEAT VISIBLE
NAUSEA AND VOMITING: NO NAUSEA AND NO VOMITING
PULSE: 82
TOTAL SCORE: 2
AUDITORY DISTURBANCES: NOT PRESENT
HEADACHE, FULLNESS IN HEAD: NOT PRESENT
HEADACHE, FULLNESS IN HEAD: NOT PRESENT
AUDITORY DISTURBANCES: NOT PRESENT
NAUSEA AND VOMITING: 2
AGITATION: NORMAL ACTIVITY
TOTAL SCORE: 0
AGITATION: SOMEWHAT MORE THAN NORMAL ACTIVITY
AUDITORY DISTURBANCES: NOT PRESENT
HEADACHE, FULLNESS IN HEAD: NOT PRESENT
VISUAL DISTURBANCES: NOT PRESENT
AGITATION: NORMAL ACTIVITY
TREMOR: NO TREMOR
TREMOR: NO TREMOR
PULSE: 66
BLOOD PRESSURE: 156/106
ANXIETY: MILDLY ANXIOUS
TREMOR: NO TREMOR
NAUSEA AND VOMITING: NO NAUSEA AND NO VOMITING
ANXIETY: NO ANXIETY, AT EASE
TREMOR: NO TREMOR
HEADACHE, FULLNESS IN HEAD: NOT PRESENT
TREMOR: NO TREMOR
ORIENTATION AND CLOUDING OF SENSORIUM: ORIENTED AND CAN DO SERIAL ADDITIONS
ANXIETY: NO ANXIETY, AT EASE
AGITATION: NORMAL ACTIVITY
HEADACHE, FULLNESS IN HEAD: NOT PRESENT
TOTAL SCORE: 0
ANXIETY: NO ANXIETY, AT EASE
PAROXYSMAL SWEATS: NO SWEAT VISIBLE
AGITATION: SOMEWHAT MORE THAN NORMAL ACTIVITY
AUDITORY DISTURBANCES: NOT PRESENT
HEADACHE, FULLNESS IN HEAD: NOT PRESENT
PAROXYSMAL SWEATS: NO SWEAT VISIBLE
TOTAL SCORE: 2
PAROXYSMAL SWEATS: NO SWEAT VISIBLE
PAROXYSMAL SWEATS: NO SWEAT VISIBLE
TOTAL SCORE: 0
VISUAL DISTURBANCES: NOT PRESENT
ANXIETY: NO ANXIETY, AT EASE
PAROXYSMAL SWEATS: NO SWEAT VISIBLE
PAROXYSMAL SWEATS: NO SWEAT VISIBLE
HEADACHE, FULLNESS IN HEAD: NOT PRESENT
AUDITORY DISTURBANCES: NOT PRESENT
ORIENTATION AND CLOUDING OF SENSORIUM: ORIENTED AND CAN DO SERIAL ADDITIONS
AUDITORY DISTURBANCES: NOT PRESENT
NAUSEA AND VOMITING: NO NAUSEA AND NO VOMITING
PAROXYSMAL SWEATS: NO SWEAT VISIBLE
AUDITORY DISTURBANCES: NOT PRESENT
PAROXYSMAL SWEATS: NO SWEAT VISIBLE
ORIENTATION AND CLOUDING OF SENSORIUM: ORIENTED AND CAN DO SERIAL ADDITIONS
NAUSEA AND VOMITING: NO NAUSEA AND NO VOMITING
ANXIETY: NO ANXIETY, AT EASE
ORIENTATION AND CLOUDING OF SENSORIUM: ORIENTED AND CAN DO SERIAL ADDITIONS
TOTAL SCORE: 1
AUDITORY DISTURBANCES: NOT PRESENT
TREMOR: NO TREMOR
VISUAL DISTURBANCES: NOT PRESENT
TREMOR: NO TREMOR
TOTAL SCORE: 0
NAUSEA AND VOMITING: NO NAUSEA AND NO VOMITING
PAROXYSMAL SWEATS: NO SWEAT VISIBLE
NAUSEA AND VOMITING: NO NAUSEA AND NO VOMITING
ORIENTATION AND CLOUDING OF SENSORIUM: ORIENTED AND CAN DO SERIAL ADDITIONS
TREMOR: NO TREMOR
TOTAL SCORE: 2
ANXIETY: NO ANXIETY, AT EASE
TREMOR: NO TREMOR
AUDITORY DISTURBANCES: NOT PRESENT
AGITATION: SOMEWHAT MORE THAN NORMAL ACTIVITY
VISUAL DISTURBANCES: NOT PRESENT
VISUAL DISTURBANCES: NOT PRESENT
HEADACHE, FULLNESS IN HEAD: NOT PRESENT
TOTAL SCORE: 0
HEADACHE, FULLNESS IN HEAD: NOT PRESENT
AUDITORY DISTURBANCES: NOT PRESENT
VISUAL DISTURBANCES: NOT PRESENT
ORIENTATION AND CLOUDING OF SENSORIUM: ORIENTED AND CAN DO SERIAL ADDITIONS
NAUSEA AND VOMITING: NO NAUSEA AND NO VOMITING
VISUAL DISTURBANCES: NOT PRESENT
ORIENTATION AND CLOUDING OF SENSORIUM: ORIENTED AND CAN DO SERIAL ADDITIONS
PAROXYSMAL SWEATS: NO SWEAT VISIBLE
VISUAL DISTURBANCES: NOT PRESENT
AGITATION: NORMAL ACTIVITY
VISUAL DISTURBANCES: NOT PRESENT
AGITATION: NORMAL ACTIVITY
NAUSEA AND VOMITING: NO NAUSEA AND NO VOMITING
AUDITORY DISTURBANCES: NOT PRESENT
PAROXYSMAL SWEATS: NO SWEAT VISIBLE
AUDITORY DISTURBANCES: NOT PRESENT
PULSE: 67
ORIENTATION AND CLOUDING OF SENSORIUM: ORIENTED AND CAN DO SERIAL ADDITIONS
BLOOD PRESSURE: 145/98
VISUAL DISTURBANCES: NOT PRESENT
ORIENTATION AND CLOUDING OF SENSORIUM: ORIENTED AND CAN DO SERIAL ADDITIONS
TREMOR: NO TREMOR
ANXIETY: MILDLY ANXIOUS
HEADACHE, FULLNESS IN HEAD: NOT PRESENT
AGITATION: SOMEWHAT MORE THAN NORMAL ACTIVITY
BLOOD PRESSURE: 163/84
NAUSEA AND VOMITING: NO NAUSEA AND NO VOMITING
NAUSEA AND VOMITING: NO NAUSEA AND NO VOMITING
AGITATION: NORMAL ACTIVITY
ORIENTATION AND CLOUDING OF SENSORIUM: ORIENTED AND CAN DO SERIAL ADDITIONS
PULSE: 65
ORIENTATION AND CLOUDING OF SENSORIUM: ORIENTED AND CAN DO SERIAL ADDITIONS
ORIENTATION AND CLOUDING OF SENSORIUM: ORIENTED AND CAN DO SERIAL ADDITIONS
HEADACHE, FULLNESS IN HEAD: NOT PRESENT
TOTAL SCORE: 0
PULSE: 56
TOTAL SCORE: 2
ANXIETY: NO ANXIETY, AT EASE
NAUSEA AND VOMITING: NO NAUSEA AND NO VOMITING
AGITATION: SOMEWHAT MORE THAN NORMAL ACTIVITY
ANXIETY: NO ANXIETY, AT EASE
HEADACHE, FULLNESS IN HEAD: NOT PRESENT
VISUAL DISTURBANCES: NOT PRESENT

## 2025-06-07 ASSESSMENT — PAIN SCALES - GENERAL
PAINLEVEL_OUTOF10: 4
PAINLEVEL_OUTOF10: 9
PAINLEVEL_OUTOF10: 9
PAINLEVEL_OUTOF10: 6
PAINLEVEL_OUTOF10: 9
PAINLEVEL_OUTOF10: 6
PAINLEVEL_OUTOF10: 0 - NO PAIN
PAINLEVEL_OUTOF10: 6
PAINLEVEL_OUTOF10: 8
PAINLEVEL_OUTOF10: 4
PAINLEVEL_OUTOF10: 7
PAINLEVEL_OUTOF10: 6

## 2025-06-07 ASSESSMENT — COGNITIVE AND FUNCTIONAL STATUS - GENERAL
DAILY ACTIVITIY SCORE: 24
MOBILITY SCORE: 24

## 2025-06-07 ASSESSMENT — PAIN - FUNCTIONAL ASSESSMENT
PAIN_FUNCTIONAL_ASSESSMENT: 0-10

## 2025-06-07 ASSESSMENT — PAIN DESCRIPTION - LOCATION
LOCATION: ABDOMEN

## 2025-06-07 ASSESSMENT — PAIN DESCRIPTION - DESCRIPTORS: DESCRIPTORS: CRUSHING

## 2025-06-07 NOTE — PROGRESS NOTES
Arlen Braga is a 29 y.o. female on day 1 of admission presenting with Alcohol-induced acute pancreatitis, unspecified complication status ([facility]).      Subjective   Arlen Braga is a 29 y.o. female with PMH of alcoholic pancreatitis, asthma, HTN presenting today for abdominal pain for one day, patient reported pain started yesterday in her left upper quadrant area. Pain is constant radiate to her epigastric area 10/10 in severity. Patient denies dysuria, hematuria, increased urinary frequency. Denies fevers though endorses subjective chills. States that she does drink alcohol daily. Notes that she drinks 2 to 4 12 ounce seltzers daily. Patient with history of pancreatitis. In the ED patient with sever pain, CBC without white count. CMP showing no electrolyte abnormalities, MARIO, transaminitis. Lipase is 89. UA negative for infection. Pregnancy test negative. Wet read of CT revealing findings consistent with pancreatitis without complication. There are surrounding areas of fat stranding and lymph nodes that radiology recommends repeat imaging after appropriate treatment to document resolution. Some gallbladder sludge without evidence of cholecystitis. Pt received pain meds, iv fluids and zofran with slight improvement in pain, trial of PO challenge was failled. Patient to be admitted to medicine for further evaluation and management.   6/7: Patient was seen and examined.  Still complaining of nausea and increasing abdominal pain on attempting clear liquid diet.  I will downgrade patient back to n.p.o. with meds with sips start IV fluids D5 LR at 150 mL/h.  Continue pain control and antiemetics as ordered.  Repeat CBC CMP and lipase in a.m.  Consider advancing diet to liquid diet at breakfast or lunch tomorrow.         Objective     Last Recorded Vitals  BP (!) 151/92   Pulse 68   Temp 36 °C (96.8 °F) (Temporal)   Resp 16   Wt 93 kg (205 lb)   SpO2 100%   Intake/Output last 3 Shifts:    Intake/Output Summary  (Last 24 hours) at 6/7/2025 1153  Last data filed at 6/7/2025 1000  Gross per 24 hour   Intake 3671.2 ml   Output --   Net 3671.2 ml       Admission Weight  Weight: 93 kg (205 lb) (06/06/25 1459)    Daily Weight  06/06/25 : 93 kg (205 lb)    Image Results  CT abdomen pelvis w IV contrast  Narrative: Interpreted By:  Finkelstein, Evan,  and Racquel Lemos   STUDY:  CT ABDOMEN PELVIS W IV CONTRAST;  6/6/2025 5:41 pm      INDICATION:  Signs/Symptoms:epigastric/luq abd pain with radiation to l flank; hx  pancraetitis.      COMPARISON:  CT abdomen and pelvis 03/30/2023.      ACCESSION NUMBER(S):  [id number]      ORDERING CLINICIAN:  LUIS COPPOLA      TECHNIQUE:  Contiguous axial images of the abdomen and pelvis were obtained after  the intravenous administration of iodinated contrast. Coronal and  sagittal reformatted images were reconstructed from the axial data.      FINDINGS:  LOWER CHEST: No focal pulmonary consolidations. No pleural effusions  or pneumothorax seen. The heart is normal in size. The visualized  distal esophagus is unremarkable.          ABDOMEN/PELVIS:      ABDOMINAL WALL: No significant abnormality.      LIVER: Several scattered subcentimeter hypodensities throughout the  liver parenchyma, too small to characterize. The liver is normal in  size.      BILE DUCTS: No significant intrahepatic or extrahepatic dilatation.      GALLBLADDER: The gallbladder is mildly distended with a small amount  of layering biliary sludge. No other findings to suggest acute  cholecystitis such as gallbladder wall thickening or surrounding  pericholecystic fluid.      PANCREAS: The body and tail of the pancreas appear edematous with  surrounding fat stranding and several adjacent subcentimeter  peripancreatic lymph nodes. The pancreas demonstrates normal  enhancement. No adjacent loculated fluid collections. The adjacent  vasculature appears patent.      SPLEEN: No significant abnormality.      ADRENALS: No significant  abnormality.      KIDNEYS, URETERS, BLADDER: The bilateral kidneys are normal in size  and enhance symmetrically. No evidence of hydroureteronephrosis or  nephroureterolithiasis. The urinary bladder is unremarkable.      REPRODUCTIVE ORGANS: The uterus is present.      VESSELS: No significant abnormality.      RETROPERITONEUM/LYMPH NODES: Multiple mesenteric lymph nodes  throughout the left mid abdomen. For example 0.6 cm lymph node within  the left-sided mesentery (series 201, image 65). No acute  retroperitoneal abnormality.      BOWEL/MESENTERY/PERITONEUM: The stomach is unremarkable. No bowel  dilatation. Wall thickening versus underdistention throughout the  colon.. The appendix is not definitively visualized, but no evidence  of pericecal fat stranding or fluid.      Small amount of left-sided mesenteric edema in the area of  lymphadenopathy. No loculated fluid collections. No evidence of free  air.          MUSCULOSKELETAL: No acute osseous abnormality. No suspicious osseous  lesion.      Impression: 1. There is edema of the body and tail of the pancreas with  surrounding fat stranding. There is normal enhancement of the  pancreas. Findings are consistent with acute interstitial  pancreatitis. Within the mesentery inferior to the pancreas, there is  an area of fat stranding and multiple prominent lymph nodes within  the mesentery. These findings are favored to be reactive to the  adjacent pancreatitis. However, recommend repeat imaging after  appropriate treatment to document resolution.  2. The gallbladder is mildly distended with a small amount of  layering biliary sludge. No findings to suggest acute cholecystitis  such as gallbladder wall thickening or surrounding pericholecystic  fluid.  3. Underdistention versus wall thickening throughout the colon.  Correlate for symptoms of colitis.          I personally reviewed the images/study and resident's interpretation  and I agree with the findings as stated  by Aminta Clement MD (resident  radiologist). This study was analyzed and interpreted at [facility], [place], [place].      MACRO:  None.      Signed by: Evan Finkelstein 6/6/2025 7:07 PM  Dictation workstation:   [id number]      Physical Exam  Vitals:    06/07/25 1055   BP: (!) 151/92   Pulse: 68   Resp:    Temp:    SpO2:       Constitutional: Patient does not appear to be in any acute distress, AOx4  HEENT: NCAT, normal external inspection of ears and nose. Oropharynx normal.  Cardio: RRR, S1/S2, no murmurs, rubs, or gallops, radial pulses +2, no edema of extremities  Pulm: CTAB, no respiratory distress.  GI: abdominal tenderness in the epigastric area and left upper quadrant. There is left CVA tenderness.   MSK: No joint swelling, normal movements of all extremities. Normal ROM, 5/5 strength  Skin: No lesions, contusions, or erythema.  Extremities: no BLE swelling   Psych: Appropriate mood and behavior  Relevant Results                              Assessment & Plan  Alcohol-induced acute pancreatitis, unspecified complication status ([facility])    #Acute alcoholic pancreatitis, recurrent  - Elevated lipase, abdominal pain radiating to back that started after drinking alcohol--> c/w acute pancreatitis  - b-hcg negative,  -CT scan suggestive of acute pancreatitis   - Resume NPO with sips/ice chips.   - Advance diet starting from clear liquid by a.m.  - Pain control with oxycodone 5mg q6h prn with breakthrough hydromorphone 0.2 q6h prn   - tylenol q6 hours PRN   - Daily CBC, CMP     #Alcohol use disorder  - CIWA with lorazepam  - Thiamin 100mg daily   - multivitamin           - code status : FULL CODE  - DVT Prophylaxis: Lovenox 40 q24hr - CrCl >30       Spent 35 minutes in the follow-up management of this patient today    Zeenat Qureshi MD

## 2025-06-07 NOTE — H&P
History Of Present Illness  Arlen Braga is a 29 y.o. female with PMH of alcoholic pancreatitis, asthma, HTN presenting today for abdominal pain for one day, patient reported pain started yesterday in her left upper quadrant area. Pain is constant radiate to her epigastric area 10/10 in severity. Patient denies dysuria, hematuria, increased urinary frequency. Denies fevers though endorses subjective chills. States that she does drink alcohol daily. Notes that she drinks 2 to 4 12 ounce seltzers daily. Patient with history of pancreatitis. In the ED patient with sever pain, CBC without white count. CMP showing no electrolyte abnormalities, MARIO, transaminitis. Lipase is 89. UA negative for infection. Pregnancy test negative. Wet read of CT revealing findings consistent with pancreatitis without complication. There are surrounding areas of fat stranding and lymph nodes that radiology recommends repeat imaging after appropriate treatment to document resolution. Some gallbladder sludge without evidence of cholecystitis. Pt received pain meds, iv fluids and zofran with slight improvement in pain, trial of PO challenge was failled. Patient to be admitted to medicine for further evaluation and management.        Past Medical History  She has a past medical history of Anemia and Depression.    Surgical History  She has a past surgical history that includes [place] tooth extraction (Bilateral).     Social History  She reports that she has been smoking cigarettes. She has never used smokeless tobacco. She reports current alcohol use of about 4.0 standard drinks of alcohol per week. She reports current drug use. Drug: Marijuana.    Family History  Family History[1]     Allergies  Patient has no known allergies.    Review of Systems   Per HPI    Physical Exam   Constitutional: Patient does not appear to be in any acute distress, AOx4  HEENT: NCAT, normal external inspection of ears and nose. Oropharynx normal.  Cardio: RRR,  S1/S2, no murmurs, rubs, or gallops, radial pulses +2, no edema of extremities  Pulm: CTAB, no respiratory distress.  GI: abdominal tenderness in the epigastric area and left upper quadrant. There is left CVA tenderness.   MSK: No joint swelling, normal movements of all extremities. Normal ROM, 5/5 strength  Skin: No lesions, contusions, or erythema.  Extremities: no BLE swelling   Psych: Appropriate mood and behavior    Last Recorded Vitals  BP (!) 152/111   Pulse 80   Temp 36.9 °C (98.5 °F) (Temporal)   Resp 18   Wt 93 kg (205 lb)   SpO2 99%     Relevant Results  Results for orders placed or performed during the hospital encounter of 06/06/25 (from the past 24 hours)   Urinalysis with Reflex Culture and Microscopic   Result Value Ref Range    Color, Urine Yellow Light-Yellow, Yellow, Dark-Yellow    Appearance, Urine Clear Clear    Specific Gravity, Urine 1.049 (N) 1.005 - 1.035    pH, Urine 6.5 5.0, 5.5, 6.0, 6.5, 7.0, 7.5, 8.0    Protein, Urine 30 (1+) (A) NEGATIVE, 10 (TRACE), 20 (TRACE) mg/dL    Glucose, Urine Normal Normal mg/dL    Blood, Urine NEGATIVE NEGATIVE mg/dL    Ketones, Urine TRACE (A) NEGATIVE mg/dL    Bilirubin, Urine NEGATIVE NEGATIVE mg/dL    Urobilinogen, Urine 4 (2+) (A) Normal mg/dL    Nitrite, Urine NEGATIVE NEGATIVE    Leukocyte Esterase, Urine NEGATIVE NEGATIVE   Urinalysis Microscopic   Result Value Ref Range    WBC, Urine 1-5 1-5, NONE /HPF    RBC, Urine 1-2 NONE, 1-2, 3-5 /HPF    Squamous Epithelial Cells, Urine 10-25 (FEW) Reference range not established. /HPF   POCT pregnancy, urine   Result Value Ref Range    Preg Test, Ur Negative    CBC and Auto Differential   Result Value Ref Range    WBC 6.3 4.4 - 11.3 x10*3/uL    nRBC 0.0 0.0 - 0.0 /100 WBCs    RBC 4.02 4.00 - 5.20 x10*6/uL    Hemoglobin 13.4 12.0 - 16.0 g/dL    Hematocrit 39.0 36.0 - 46.0 %    MCV 97 80 - 100 fL    MCH 33.3 26.0 - 34.0 pg    MCHC 34.4 32.0 - 36.0 g/dL    RDW 12.8 11.5 - 14.5 %    Platelets 212 150 - 450  x10*3/uL    Neutrophils % 58.5 40.0 - 80.0 %    Immature Granulocytes %, Automated 0.2 0.0 - 0.9 %    Lymphocytes % 34.1 13.0 - 44.0 %    Monocytes % 5.6 2.0 - 10.0 %    Eosinophils % 1.1 0.0 - 6.0 %    Basophils % 0.5 0.0 - 2.0 %    Neutrophils Absolute 3.68 1.20 - 7.70 x10*3/uL    Immature Granulocytes Absolute, Automated 0.01 0.00 - 0.70 x10*3/uL    Lymphocytes Absolute 2.14 1.20 - 4.80 x10*3/uL    Monocytes Absolute 0.35 0.10 - 1.00 x10*3/uL    Eosinophils Absolute 0.07 0.00 - 0.70 x10*3/uL    Basophils Absolute 0.03 0.00 - 0.10 x10*3/uL   Comprehensive metabolic panel   Result Value Ref Range    Glucose 105 (H) 74 - 99 mg/dL    Sodium 138 136 - 145 mmol/L    Potassium 3.5 3.5 - 5.3 mmol/L    Chloride 103 98 - 107 mmol/L    Bicarbonate 25 21 - 32 mmol/L    Anion Gap 14 10 - 20 mmol/L    Urea Nitrogen 11 6 - 23 mg/dL    Creatinine 1.07 (H) 0.50 - 1.05 mg/dL    eGFR 72 >60 mL/min/1.73m*2    Calcium 9.5 8.6 - 10.6 mg/dL    Albumin 4.6 3.4 - 5.0 g/dL    Alkaline Phosphatase 98 33 - 110 U/L    Total Protein 7.3 6.4 - 8.2 g/dL    AST 21 9 - 39 U/L    Bilirubin, Total 0.5 0.0 - 1.2 mg/dL    ALT 18 7 - 45 U/L   Lipase   Result Value Ref Range    Lipase 89 (H) 9 - 82 U/L             Assessment/Plan      female with PMH of alcoholic pancreatitis, asthma, HTN presenting today for abdominal pain, lipase 89, and abdominal CT with signs suggestive of pancreatitis. Patient drinks alcohol daily likeyl the pancreatitis related to her alcohol drinking.     #Acute alcoholic pancreatitis, recurrent  - Elevated lipase, abdominal pain radiating to back that started after drinking alcohol--> c/w acute pancreatitis  - b-hcg negative,  -CT scan suggestive of acute pancreatitis   - NPO with sips/ice chips. Advance diet as tolerated  - Pain control with oxycodone 5mg q6h prn with breakthrough hydromorphone 0.2 q6h prn   - tylenol q6 hours PRN   - Daily CBC, CMP     #Alcohol use disorder  - CIWA with lorazepam  - Thiamin 100mg daily   -  multivitamin   -        - code status : FULL CODE  - DVT Prophylaxis: Lovenox 40 q24hr - CrCl >30    Assessment & Plan  Alcohol-induced acute pancreatitis, unspecified complication status ([facility]HS-HCC)             Key Tomlinson MD         [1] No family history on file.

## 2025-06-07 NOTE — CARE PLAN
The patient's goals for the shift include remain stable    The clinical goals for the shift include manage pain      Problem: Pain - Adult  Goal: Verbalizes/displays adequate comfort level or baseline comfort level  Outcome: Progressing     Problem: Safety - Adult  Goal: Free from fall injury  Outcome: Progressing     Problem: Discharge Planning  Goal: Discharge to home or other facility with appropriate resources  Outcome: Progressing     Problem: Chronic Conditions and Co-morbidities  Goal: Patient's chronic conditions and co-morbidity symptoms are monitored and maintained or improved  Outcome: Progressing

## 2025-06-07 NOTE — CARE PLAN
The patient's goals for the shift include remain stable    The clinical goals for the shift include relax     Pt had an uneventful night, but had abdominal pain.

## 2025-06-07 NOTE — PROGRESS NOTES
Patient was handed off to me from the previous team. For full history, physical, and prior ED course, please see previous provider note prior to patient handoff. This is an addendum to the record.    Arlen Braga  presented to Emergency Department  for   Chief Complaint   Patient presents with    Nausea    Abdominal Pain   Patient has a history of alcohol induced pancreatitis, drinks 2 to 412 ounce seltzers daily.  Endorsing epigastric/left upper quadrant abdominal pain radiating to her back x1 day.     Medical work up included labs, diagnostic testing.   ED Course as of 06/06/25 2040 Fri Jun 06, 2025   1605 Preg Test, Ur: Negative [ML]   1841 CT abdomen pelvis w IV contrast  . There is edema of the body and tail of the pancreas with  surrounding fat stranding. There is normal enhancement of the  pancreas. Findings are consistent with acute interstitial  pancreatitis. Within the mesentery inferior to the pancreas, there is  an area of fat stranding and multiple prominent lymph nodes within  the mesentery. These findings are favored to be reactive to the  adjacent pancreatitis. However, recommend repeat imaging after  appropriate treatment to document resolution.  2. The gallbladder is mildly distended with a small amount of  layering biliary sludge. No findings to suggest acute cholecystitis  such as gallbladder wall thickening or surrounding pericholecystic  fluid.   [ML]      ED Course User Index  [ML] Cecily Finn PA-C         Diagnoses as of 06/06/25 2040   Left upper quadrant abdominal pain   Nausea and vomiting, unspecified vomiting type   Alcohol-induced acute pancreatitis, unspecified complication status ([facility])       Labs:  Labs Reviewed   COMPREHENSIVE METABOLIC PANEL - Abnormal       Result Value    Glucose 105 (*)     Sodium 138      Potassium 3.5      Chloride 103      Bicarbonate 25      Anion Gap 14      Urea Nitrogen 11      Creatinine 1.07 (*)     eGFR 72      Calcium 9.5      Albumin 4.6       Alkaline Phosphatase 98      Total Protein 7.3      AST 21      Bilirubin, Total 0.5      ALT 18     LIPASE - Abnormal    Lipase 89 (*)     Narrative:     Venipuncture immediately after or during the administration of Metamizole may lead to falsely low results. Testing should be performed immediately prior to Metamizole dosing.   URINALYSIS WITH REFLEX CULTURE AND MICROSCOPIC - Abnormal    Color, Urine Yellow      Appearance, Urine Clear      Specific Gravity, Urine 1.049 (*)     pH, Urine 6.5      Protein, Urine 30 (1+) (*)     Glucose, Urine Normal      Blood, Urine NEGATIVE      Ketones, Urine TRACE (*)     Bilirubin, Urine NEGATIVE      Urobilinogen, Urine 4 (2+) (*)     Nitrite, Urine NEGATIVE      Leukocyte Esterase, Urine NEGATIVE     POCT PREGNANCY, URINE - Normal    Preg Test, Ur Negative     CBC WITH AUTO DIFFERENTIAL    WBC 6.3      nRBC 0.0      RBC 4.02      Hemoglobin 13.4      Hematocrit 39.0      MCV 97      MCH 33.3      MCHC 34.4      RDW 12.8      Platelets 212      Neutrophils % 58.5      Immature Granulocytes %, Automated 0.2      Lymphocytes % 34.1      Monocytes % 5.6      Eosinophils % 1.1      Basophils % 0.5      Neutrophils Absolute 3.68      Immature Granulocytes Absolute, Automated 0.01      Lymphocytes Absolute 2.14      Monocytes Absolute 0.35      Eosinophils Absolute 0.07      Basophils Absolute 0.03     URINALYSIS WITH REFLEX CULTURE AND MICROSCOPIC    Narrative:     The following orders were created for panel order Urinalysis with Reflex Culture and Microscopic.  Procedure                               Abnormality         Status                     ---------                               -----------         ------                     Urinalysis with Reflex C...[[id number]]  Abnormal            Final result               Extra Urine Gray Tube[[id number]]                            In process                   Please view results for these tests on the individual orders.   EXTRA URINE  GRAY TUBE   URINALYSIS MICROSCOPIC WITH REFLEX CULTURE    WBC, Urine 1-5      RBC, Urine 1-2      Squamous Epithelial Cells, Urine 10-25 (FEW)         Imaging:  CT abdomen pelvis w IV contrast   Final Result   1. There is edema of the body and tail of the pancreas with   surrounding fat stranding. There is normal enhancement of the   pancreas. Findings are consistent with acute interstitial   pancreatitis. Within the mesentery inferior to the pancreas, there is   an area of fat stranding and multiple prominent lymph nodes within   the mesentery. These findings are favored to be reactive to the   adjacent pancreatitis. However, recommend repeat imaging after   appropriate treatment to document resolution.   2. The gallbladder is mildly distended with a small amount of   layering biliary sludge. No findings to suggest acute cholecystitis   such as gallbladder wall thickening or surrounding pericholecystic   fluid.   3. Underdistention versus wall thickening throughout the colon.   Correlate for symptoms of colitis.             I personally reviewed the images/study and resident's interpretation   and I agree with the findings as stated by Aminta Clement MD (resident   radiologist). This study was analyzed and interpreted at [place], [place].        MACRO:   None.        Signed by: Evan Finkelstein 6/6/2025 7:07 PM   Dictation workstation:   [id number]              At time of sign out pending: CT abdomen pelvis, p.o. challenge, symptom improvement.     What occured after transition of care: CT abdomen pelvis showed evidence of acute interstitial pancreatitis gallbladder with biliary sludge and colitis. Symptoms had improved with toradol, BMX, and morphine, however patient was unable to tolerate PO intake and failed PO challenge.  Patient will need admitted for IV fluids, pain control, bowel rest and reassessment.  Patient agreeable to plan and admitted to medicine. Bed  slip placed.       Case reviewed with Dr. Jurgen Loaiza PA-C

## 2025-06-08 VITALS
HEART RATE: 76 BPM | DIASTOLIC BLOOD PRESSURE: 95 MMHG | SYSTOLIC BLOOD PRESSURE: 131 MMHG | RESPIRATION RATE: 18 BRPM | HEIGHT: 64 IN | WEIGHT: 205 LBS | OXYGEN SATURATION: 100 % | TEMPERATURE: 97.3 F | BODY MASS INDEX: 35 KG/M2

## 2025-06-08 LAB
BASOPHILS # BLD AUTO: 0.01 X10*3/UL (ref 0–0.1)
BASOPHILS NFR BLD AUTO: 0.3 %
EOSINOPHIL # BLD AUTO: 0.07 X10*3/UL (ref 0–0.7)
EOSINOPHIL NFR BLD AUTO: 1.8 %
ERYTHROCYTE [DISTWIDTH] IN BLOOD BY AUTOMATED COUNT: 12.4 % (ref 11.5–14.5)
HCT VFR BLD AUTO: 36 % (ref 36–46)
HGB BLD-MCNC: 12.1 G/DL (ref 12–16)
IMM GRANULOCYTES # BLD AUTO: 0.01 X10*3/UL (ref 0–0.7)
IMM GRANULOCYTES NFR BLD AUTO: 0.3 % (ref 0–0.9)
LYMPHOCYTES # BLD AUTO: 1.97 X10*3/UL (ref 1.2–4.8)
LYMPHOCYTES NFR BLD AUTO: 51.3 %
MCH RBC QN AUTO: 33.2 PG (ref 26–34)
MCHC RBC AUTO-ENTMCNC: 33.6 G/DL (ref 32–36)
MCV RBC AUTO: 99 FL (ref 80–100)
MONOCYTES # BLD AUTO: 0.21 X10*3/UL (ref 0.1–1)
MONOCYTES NFR BLD AUTO: 5.5 %
NEUTROPHILS # BLD AUTO: 1.57 X10*3/UL (ref 1.2–7.7)
NEUTROPHILS NFR BLD AUTO: 40.8 %
NRBC BLD-RTO: 0 /100 WBCS (ref 0–0)
PLATELET # BLD AUTO: 167 X10*3/UL (ref 150–450)
RBC # BLD AUTO: 3.64 X10*6/UL (ref 4–5.2)
WBC # BLD AUTO: 3.8 X10*3/UL (ref 4.4–11.3)

## 2025-06-08 PROCEDURE — 2500000001 HC RX 250 WO HCPCS SELF ADMINISTERED DRUGS (ALT 637 FOR MEDICARE OP): Mod: SE

## 2025-06-08 PROCEDURE — 2500000001 HC RX 250 WO HCPCS SELF ADMINISTERED DRUGS (ALT 637 FOR MEDICARE OP): Mod: SE | Performed by: INTERNAL MEDICINE

## 2025-06-08 PROCEDURE — 99232 SBSQ HOSP IP/OBS MODERATE 35: CPT | Performed by: INTERNAL MEDICINE

## 2025-06-08 PROCEDURE — 2500000004 HC RX 250 GENERAL PHARMACY W/ HCPCS (ALT 636 FOR OP/ED): Mod: JZ,SE | Performed by: INTERNAL MEDICINE

## 2025-06-08 PROCEDURE — 85025 COMPLETE CBC W/AUTO DIFF WBC: CPT

## 2025-06-08 PROCEDURE — 2500000004 HC RX 250 GENERAL PHARMACY W/ HCPCS (ALT 636 FOR OP/ED): Mod: SE | Performed by: STUDENT IN AN ORGANIZED HEALTH CARE EDUCATION/TRAINING PROGRAM

## 2025-06-08 PROCEDURE — 1100000001 HC PRIVATE ROOM DAILY

## 2025-06-08 PROCEDURE — 2500000004 HC RX 250 GENERAL PHARMACY W/ HCPCS (ALT 636 FOR OP/ED): Mod: SE

## 2025-06-08 PROCEDURE — 36415 COLL VENOUS BLD VENIPUNCTURE: CPT

## 2025-06-08 RX ORDER — KETOROLAC TROMETHAMINE 15 MG/ML
15 INJECTION, SOLUTION INTRAMUSCULAR; INTRAVENOUS EVERY 6 HOURS PRN
Status: DISPENSED | OUTPATIENT
Start: 2025-06-08 | End: 2025-06-13

## 2025-06-08 RX ORDER — ALUMINUM HYDROXIDE, MAGNESIUM HYDROXIDE, AND SIMETHICONE 1200; 120; 1200 MG/30ML; MG/30ML; MG/30ML
20 SUSPENSION ORAL 4 TIMES DAILY PRN
Status: DISPENSED | OUTPATIENT
Start: 2025-06-08

## 2025-06-08 RX ADMIN — OXYCODONE 5 MG: 5 TABLET ORAL at 07:34

## 2025-06-08 RX ADMIN — THIAMINE HYDROCHLORIDE 100 MG: 100 INJECTION, SOLUTION INTRAMUSCULAR; INTRAVENOUS at 21:47

## 2025-06-08 RX ADMIN — HYDROMORPHONE HYDROCHLORIDE 0.2 MG: 1 INJECTION, SOLUTION INTRAMUSCULAR; INTRAVENOUS; SUBCUTANEOUS at 03:51

## 2025-06-08 RX ADMIN — DEXTROSE, SODIUM CHLORIDE, SODIUM LACTATE, POTASSIUM CHLORIDE, AND CALCIUM CHLORIDE 75 ML/HR: 5; .6; .31; .03; .02 INJECTION, SOLUTION INTRAVENOUS at 13:36

## 2025-06-08 RX ADMIN — HYDRALAZINE HYDROCHLORIDE 25 MG: 25 TABLET ORAL at 08:56

## 2025-06-08 RX ADMIN — HYDROMORPHONE HYDROCHLORIDE 0.2 MG: 1 INJECTION, SOLUTION INTRAMUSCULAR; INTRAVENOUS; SUBCUTANEOUS at 18:37

## 2025-06-08 RX ADMIN — Medication 1 TABLET: at 08:56

## 2025-06-08 RX ADMIN — ENOXAPARIN SODIUM 40 MG: 100 INJECTION SUBCUTANEOUS at 21:48

## 2025-06-08 RX ADMIN — FOLIC ACID 1 MG: 1 TABLET ORAL at 08:56

## 2025-06-08 RX ADMIN — ALUMINUM HYDROXIDE, MAGNESIUM HYDROXIDE, AND DIMETHICONE 20 ML: 200; 20; 200 SUSPENSION ORAL at 09:04

## 2025-06-08 RX ADMIN — KETOROLAC TROMETHAMINE 15 MG: 15 INJECTION, SOLUTION INTRAMUSCULAR; INTRAVENOUS at 22:10

## 2025-06-08 RX ADMIN — HYDRALAZINE HYDROCHLORIDE 25 MG: 25 TABLET ORAL at 21:47

## 2025-06-08 RX ADMIN — KETOROLAC TROMETHAMINE 15 MG: 15 INJECTION, SOLUTION INTRAMUSCULAR; INTRAVENOUS at 13:36

## 2025-06-08 ASSESSMENT — LIFESTYLE VARIABLES
ANXIETY: NO ANXIETY, AT EASE
AGITATION: SOMEWHAT MORE THAN NORMAL ACTIVITY
PAROXYSMAL SWEATS: NO SWEAT VISIBLE
TREMOR: NO TREMOR
AUDITORY DISTURBANCES: NOT PRESENT
VISUAL DISTURBANCES: NOT PRESENT
TOTAL SCORE: 0
BLOOD PRESSURE: 160/108
TREMOR: NO TREMOR
TREMOR: NO TREMOR
AUDITORY DISTURBANCES: NOT PRESENT
AGITATION: NORMAL ACTIVITY
TREMOR: NO TREMOR
BLOOD PRESSURE: 170/118
AGITATION: SOMEWHAT MORE THAN NORMAL ACTIVITY
HEADACHE, FULLNESS IN HEAD: NOT PRESENT
AGITATION: NORMAL ACTIVITY
PAROXYSMAL SWEATS: NO SWEAT VISIBLE
PAROXYSMAL SWEATS: NO SWEAT VISIBLE
AGITATION: NORMAL ACTIVITY
NAUSEA AND VOMITING: NO NAUSEA AND NO VOMITING
ANXIETY: NO ANXIETY, AT EASE
PULSE: 58
HEADACHE, FULLNESS IN HEAD: NOT PRESENT
HEADACHE, FULLNESS IN HEAD: NOT PRESENT
PAROXYSMAL SWEATS: NO SWEAT VISIBLE
ORIENTATION AND CLOUDING OF SENSORIUM: ORIENTED AND CAN DO SERIAL ADDITIONS
ORIENTATION AND CLOUDING OF SENSORIUM: ORIENTED AND CAN DO SERIAL ADDITIONS
PAROXYSMAL SWEATS: NO SWEAT VISIBLE
VISUAL DISTURBANCES: NOT PRESENT
TREMOR: NO TREMOR
TREMOR: NO TREMOR
TOTAL SCORE: 2
NAUSEA AND VOMITING: NO NAUSEA AND NO VOMITING
HEADACHE, FULLNESS IN HEAD: NOT PRESENT
AUDITORY DISTURBANCES: NOT PRESENT
ANXIETY: MILDLY ANXIOUS
PULSE: 80
NAUSEA AND VOMITING: NO NAUSEA AND NO VOMITING
NAUSEA AND VOMITING: NO NAUSEA AND NO VOMITING
VISUAL DISTURBANCES: NOT PRESENT
VISUAL DISTURBANCES: NOT PRESENT
ORIENTATION AND CLOUDING OF SENSORIUM: ORIENTED AND CAN DO SERIAL ADDITIONS
AUDITORY DISTURBANCES: NOT PRESENT
BLOOD PRESSURE: 156/108
AUDITORY DISTURBANCES: NOT PRESENT
VISUAL DISTURBANCES: NOT PRESENT
TOTAL SCORE: 2
AGITATION: NORMAL ACTIVITY
TOTAL SCORE: 0
ORIENTATION AND CLOUDING OF SENSORIUM: ORIENTED AND CAN DO SERIAL ADDITIONS
PULSE: 58
HEADACHE, FULLNESS IN HEAD: NOT PRESENT
NAUSEA AND VOMITING: NO NAUSEA AND NO VOMITING
ANXIETY: NO ANXIETY, AT EASE
AUDITORY DISTURBANCES: NOT PRESENT
NAUSEA AND VOMITING: NO NAUSEA AND NO VOMITING
PAROXYSMAL SWEATS: NO SWEAT VISIBLE
ANXIETY: MILDLY ANXIOUS
HEADACHE, FULLNESS IN HEAD: NOT PRESENT
ANXIETY: MILDLY ANXIOUS
VISUAL DISTURBANCES: NOT PRESENT

## 2025-06-08 ASSESSMENT — PAIN - FUNCTIONAL ASSESSMENT
PAIN_FUNCTIONAL_ASSESSMENT: 0-10

## 2025-06-08 ASSESSMENT — PAIN DESCRIPTION - LOCATION
LOCATION: GENERALIZED
LOCATION: BACK
LOCATION: ABDOMEN

## 2025-06-08 ASSESSMENT — COGNITIVE AND FUNCTIONAL STATUS - GENERAL
DAILY ACTIVITIY SCORE: 24
MOBILITY SCORE: 24

## 2025-06-08 ASSESSMENT — PAIN SCALES - GENERAL
PAINLEVEL_OUTOF10: 7
PAINLEVEL_OUTOF10: 3
PAINLEVEL_OUTOF10: 8
PAINLEVEL_OUTOF10: 8
PAINLEVEL_OUTOF10: 2
PAINLEVEL_OUTOF10: 0 - NO PAIN
PAINLEVEL_OUTOF10: 4
PAINLEVEL_OUTOF10: 8
PAINLEVEL_OUTOF10: 7

## 2025-06-08 ASSESSMENT — ACTIVITIES OF DAILY LIVING (ADL): LACK_OF_TRANSPORTATION: NO

## 2025-06-08 ASSESSMENT — PAIN DESCRIPTION - DESCRIPTORS: DESCRIPTORS: CRUSHING

## 2025-06-08 NOTE — PROGRESS NOTES
Arlen Braga is a 29 y.o. female on day 2 of admission presenting with Alcohol-induced acute pancreatitis, unspecified complication status (HHS-HCC).      Subjective   Patient was seen and examined at bedside this morning, in company of family member (brother), stated that she had persistent epigastric pain refractory to her current pain management regimen, and denied fever, chills, nausea, vomiting, chest pain or shortness of breath.       Objective     Last Recorded Vitals  BP (!) 148/105   Pulse 72   Temp 36.7 °C (98.1 °F)   Resp 15   Wt 93 kg (205 lb)   SpO2 100%   Intake/Output last 3 Shifts:    Intake/Output Summary (Last 24 hours) at 6/8/2025 1444  Last data filed at 6/8/2025 0636  Gross per 24 hour   Intake 2200 ml   Output --   Net 2200 ml       Admission Weight  Weight: 93 kg (205 lb) (06/06/25 1459)    Daily Weight  06/06/25 : 93 kg (205 lb)    Image Results  CT abdomen pelvis w IV contrast  Narrative: Interpreted By:  Finkelstein, Evan,  and Racquel Lemos   STUDY:  CT ABDOMEN PELVIS W IV CONTRAST;  6/6/2025 5:41 pm      INDICATION:  Signs/Symptoms:epigastric/luq abd pain with radiation to l flank; hx  pancraetitis.      COMPARISON:  CT abdomen and pelvis 03/30/2023.      ACCESSION NUMBER(S):  [id number]      ORDERING CLINICIAN:  LUIS COPPOLA      TECHNIQUE:  Contiguous axial images of the abdomen and pelvis were obtained after  the intravenous administration of iodinated contrast. Coronal and  sagittal reformatted images were reconstructed from the axial data.      FINDINGS:  LOWER CHEST: No focal pulmonary consolidations. No pleural effusions  or pneumothorax seen. The heart is normal in size. The visualized  distal esophagus is unremarkable.          ABDOMEN/PELVIS:      ABDOMINAL WALL: No significant abnormality.      LIVER: Several scattered subcentimeter hypodensities throughout the  liver parenchyma, too small to characterize. The liver is normal in  size.      BILE DUCTS: No significant  intrahepatic or extrahepatic dilatation.      GALLBLADDER: The gallbladder is mildly distended with a small amount  of layering biliary sludge. No other findings to suggest acute  cholecystitis such as gallbladder wall thickening or surrounding  pericholecystic fluid.      PANCREAS: The body and tail of the pancreas appear edematous with  surrounding fat stranding and several adjacent subcentimeter  peripancreatic lymph nodes. The pancreas demonstrates normal  enhancement. No adjacent loculated fluid collections. The adjacent  vasculature appears patent.      SPLEEN: No significant abnormality.      ADRENALS: No significant abnormality.      KIDNEYS, URETERS, BLADDER: The bilateral kidneys are normal in size  and enhance symmetrically. No evidence of hydroureteronephrosis or  nephroureterolithiasis. The urinary bladder is unremarkable.      REPRODUCTIVE ORGANS: The uterus is present.      VESSELS: No significant abnormality.      RETROPERITONEUM/LYMPH NODES: Multiple mesenteric lymph nodes  throughout the left mid abdomen. For example 0.6 cm lymph node within  the left-sided mesentery (series 201, image 65). No acute  retroperitoneal abnormality.      BOWEL/MESENTERY/PERITONEUM: The stomach is unremarkable. No bowel  dilatation. Wall thickening versus underdistention throughout the  colon.. The appendix is not definitively visualized, but no evidence  of pericecal fat stranding or fluid.      Small amount of left-sided mesenteric edema in the area of  lymphadenopathy. No loculated fluid collections. No evidence of free  air.          MUSCULOSKELETAL: No acute osseous abnormality. No suspicious osseous  lesion.      Impression: 1. There is edema of the body and tail of the pancreas with  surrounding fat stranding. There is normal enhancement of the  pancreas. Findings are consistent with acute interstitial  pancreatitis. Within the mesentery inferior to the pancreas, there is  an area of fat stranding and multiple  prominent lymph nodes within  the mesentery. These findings are favored to be reactive to the  adjacent pancreatitis. However, recommend repeat imaging after  appropriate treatment to document resolution.  2. The gallbladder is mildly distended with a small amount of  layering biliary sludge. No findings to suggest acute cholecystitis  such as gallbladder wall thickening or surrounding pericholecystic  fluid.  3. Underdistention versus wall thickening throughout the colon.  Correlate for symptoms of colitis.          I personally reviewed the images/study and resident's interpretation  and I agree with the findings as stated by Aminta Clement MD (resident  radiologist). This study was analyzed and interpreted at [place], [place].      MACRO:  None.      Signed by: Evan Finkelstein 6/6/2025 7:07 PM  Dictation workstation:   [id number]      Physical Exam  Constitutional: Alert active, cooperative, in mild distress for pain  Eyes: PERRLA, clear sclera  ENMT: Moist mucosal membranes, no exudate  Head / Neck: Atraumatic, normocephalic, supple neck, JVP not visualized  Lungs: Patent airways, CTABL  Heart: RRR, S1S2, no murmurs appreciated, palpable pulses in all extremities  GI: Soft, tender to palpation in the epigastrium, slightly tympanic, bowel sounds present in all quadrants  MSK: Moves all extremities freely, no restriction  of ROM, no joint edema  Extremities: Intact x 4, no peripheral edema  : No Mckenna catheter inserted  Breast: Deferred  Neurological: AAO x 3 to person, place and date, facial muscles symmetrical, sensation intact, strength 4/4, no acute focal neurological deficits appreciated  Psychological: Appropriate mood and behavior    Relevant Results                Scheduled medications  Scheduled Medications[1]  Continuous medications  Continuous Medications[2]  PRN medications  PRN Medications[3]                Assessment & Plan  Alcohol-induced acute  pancreatitis, unspecified complication status (HHS-HCC)    29 y.o. female with PMH of alcoholic pancreatitis, asthma, HTN presenting today for abdominal pain for one day, patient reported pain started yesterday in her left upper quadrant area. Pain is constant radiate to her epigastric area 10/10 in severity. Patient denies dysuria, hematuria, increased urinary frequency. Denies fevers though endorses subjective chills. States that she does drink alcohol daily. Notes that she drinks 2 to 4 12 ounce seltzers daily      Acute alcoholic pancreatitis, recurrent  - Elevated lipase, abdominal pain radiating to back that started after drinking alcohol--> c/w acute pancreatitis  - b-hcg negative,  -CT scan suggestive of acute pancreatitis  -Initially unable to tolerate clear liquid diet, due to postprandial pain  -Able to to try again after initiation of Toradol  - Pain control with oxycodone 5mg q6h prn with breakthrough hydromorphone 0.2 q6h prn   - tylenol q6 hours PRN   -Toradol 15 mg IV push every 6 hours as needed severe pain or breakthrough pain  - Daily CBC, CMP     Alcohol use disorder  - No active w/d signs  - CIWA with lorazepam  - Thiamin 100mg daily   - multivitamin     Diet  - Clear liquid    DVT prophylaxis  -Lovenox 40 mg subcu daily    Disposition: Presenting with epigastric pain, found with acute pancreatitis, need further management, discharge pending clinical improvement.    Anticipated discharge tomorrow if symptoms resolve and tolerating diet.      Reginald Reyes DO           [1] enoxaparin, 40 mg, subcutaneous, q24h  folic acid, 1 mg, oral, Daily  hydrALAZINE, 25 mg, oral, BID  multivitamin with minerals, 1 tablet, oral, Daily  [START ON 6/9/2025] thiamine, 100 mg, oral, Daily  thiamine, 100 mg, intravenous, q24h  [2] dextrose 5 % and lactated Ringer's, 75 mL/hr, Last Rate: 75 mL/hr (06/08/25 1336)  [3] PRN medications: acetaminophen **OR** acetaminophen **OR** acetaminophen, alum-mag  hydroxide-simeth, HYDROmorphone, ketorolac, LORazepam **OR** LORazepam **OR** LORazepam, ondansetron **OR** ondansetron, oxyCODONE

## 2025-06-08 NOTE — CARE PLAN
The patient's goals for the shift include remain stable    The clinical goals for the shift include pt will sleep 4 hours during the night    Pt had an uneventful night

## 2025-06-08 NOTE — PROGRESS NOTES
06/08/25 1026   Discharge Planning   Living Arrangements Parent;Children  (Home with mother and 2 children (ages 8 and 1).)   Support Systems Parent   Assistance Needed None   Type of Residence Private residence   Who is requesting discharge planning? Patient   Home or Post Acute Services None   Expected Discharge Disposition Home   Does the patient need discharge transport arranged? No  (Pt will call family (mother, boyfriend or brother).)   Financial Resource Strain   How hard is it for you to pay for the very basics like food, housing, medical care, and heating? Not very   Housing Stability   In the last 12 months, was there a time when you were not able to pay the mortgage or rent on time? N   At any time in the past 12 months, were you homeless or living in a shelter (including now)? N   Transportation Needs   In the past 12 months, has lack of transportation kept you from medical appointments or from getting medications? no   In the past 12 months, has lack of transportation kept you from meetings, work, or from getting things needed for daily living? No   Intensity of Service   Intensity of Service 0-30 min     Assessment Note:  Met with pt and introduced myself as care coordinator and member of the Care Transitions team for discharge planning.   Pt was independent prior to admission.  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  Pt drives to tha person.  Pt's address, phone number and contact information was verified.  Pt smokes 1/2 ppd.  Pt was offered a nicotine patch but she declined.  Pt also admits to daily alcohol use (drinks Tequila).  Pt states she has participated in AA and peer support programs in the past.  Pt was offered alcohol cessation information but declined at this time.  Pt requested housing resources, referral was sent to the [facility] for assistance.  Pt does not have any other questions/concerns at this time.     Previous Home Care: None.  DME: None.  Pharmacy:  [facility] on [address].  Falls: Denies.  PCP:  Pt's last provider was at [facility].  Per pt, she received a letter that her provider has left the practice.  Pt will need a new PCP.    Magda Kearney MSN, RN-BC  Transitional Care Coordinator (TCC)  [phone]

## 2025-06-08 NOTE — CARE PLAN
The patient's goals for the shift include remain stable    The clinical goals for the shift include pt will manage nausea with diet      Problem: Pain - Adult  Goal: Verbalizes/displays adequate comfort level or baseline comfort level  Outcome: Progressing     Problem: Safety - Adult  Goal: Free from fall injury  Outcome: Progressing     Problem: Discharge Planning  Goal: Discharge to home or other facility with appropriate resources  Flowsheets (Taken 6/8/2025 2340)  Discharge to home or other facility with appropriate resources: Arrange for needed discharge resources and transportation as appropriate

## 2025-06-09 LAB
ALBUMIN SERPL BCP-MCNC: 4 G/DL (ref 3.4–5)
ALP SERPL-CCNC: 74 U/L (ref 33–110)
ALT SERPL W P-5'-P-CCNC: 11 U/L (ref 7–45)
ANION GAP SERPL CALC-SCNC: 12 MMOL/L (ref 10–20)
AST SERPL W P-5'-P-CCNC: 18 U/L (ref 9–39)
BASOPHILS # BLD AUTO: 0.03 X10*3/UL (ref 0–0.1)
BASOPHILS NFR BLD AUTO: 0.6 %
BILIRUB SERPL-MCNC: 0.4 MG/DL (ref 0–1.2)
BUN SERPL-MCNC: 4 MG/DL (ref 6–23)
CALCIUM SERPL-MCNC: 9 MG/DL (ref 8.6–10.6)
CHLORIDE SERPL-SCNC: 104 MMOL/L (ref 98–107)
CO2 SERPL-SCNC: 25 MMOL/L (ref 21–32)
CREAT SERPL-MCNC: 0.59 MG/DL (ref 0.5–1.05)
EGFRCR SERPLBLD CKD-EPI 2021: >90 ML/MIN/1.73M*2
EOSINOPHIL # BLD AUTO: 0.09 X10*3/UL (ref 0–0.7)
EOSINOPHIL NFR BLD AUTO: 1.9 %
ERYTHROCYTE [DISTWIDTH] IN BLOOD BY AUTOMATED COUNT: 12.3 % (ref 11.5–14.5)
GLUCOSE SERPL-MCNC: 77 MG/DL (ref 74–99)
HCT VFR BLD AUTO: 41.2 % (ref 36–46)
HGB BLD-MCNC: 13.6 G/DL (ref 12–16)
IMM GRANULOCYTES # BLD AUTO: 0.01 X10*3/UL (ref 0–0.7)
IMM GRANULOCYTES NFR BLD AUTO: 0.2 % (ref 0–0.9)
LYMPHOCYTES # BLD AUTO: 2.62 X10*3/UL (ref 1.2–4.8)
LYMPHOCYTES NFR BLD AUTO: 54.7 %
MCH RBC QN AUTO: 33.3 PG (ref 26–34)
MCHC RBC AUTO-ENTMCNC: 33 G/DL (ref 32–36)
MCV RBC AUTO: 101 FL (ref 80–100)
MONOCYTES # BLD AUTO: 0.3 X10*3/UL (ref 0.1–1)
MONOCYTES NFR BLD AUTO: 6.3 %
NEUTROPHILS # BLD AUTO: 1.74 X10*3/UL (ref 1.2–7.7)
NEUTROPHILS NFR BLD AUTO: 36.3 %
NRBC BLD-RTO: 0 /100 WBCS (ref 0–0)
PLATELET # BLD AUTO: 191 X10*3/UL (ref 150–450)
POTASSIUM SERPL-SCNC: 3.3 MMOL/L (ref 3.5–5.3)
PROT SERPL-MCNC: 6.7 G/DL (ref 6.4–8.2)
RBC # BLD AUTO: 4.08 X10*6/UL (ref 4–5.2)
SODIUM SERPL-SCNC: 138 MMOL/L (ref 136–145)
WBC # BLD AUTO: 4.8 X10*3/UL (ref 4.4–11.3)

## 2025-06-09 PROCEDURE — 2500000001 HC RX 250 WO HCPCS SELF ADMINISTERED DRUGS (ALT 637 FOR MEDICARE OP): Mod: SE | Performed by: INTERNAL MEDICINE

## 2025-06-09 PROCEDURE — 36415 COLL VENOUS BLD VENIPUNCTURE: CPT

## 2025-06-09 PROCEDURE — 2500000004 HC RX 250 GENERAL PHARMACY W/ HCPCS (ALT 636 FOR OP/ED): Mod: JZ,SE | Performed by: INTERNAL MEDICINE

## 2025-06-09 PROCEDURE — 2500000004 HC RX 250 GENERAL PHARMACY W/ HCPCS (ALT 636 FOR OP/ED): Mod: SE

## 2025-06-09 PROCEDURE — 99233 SBSQ HOSP IP/OBS HIGH 50: CPT | Performed by: INTERNAL MEDICINE

## 2025-06-09 PROCEDURE — 85025 COMPLETE CBC W/AUTO DIFF WBC: CPT

## 2025-06-09 PROCEDURE — 2500000001 HC RX 250 WO HCPCS SELF ADMINISTERED DRUGS (ALT 637 FOR MEDICARE OP): Mod: SE

## 2025-06-09 PROCEDURE — 80053 COMPREHEN METABOLIC PANEL: CPT | Performed by: INTERNAL MEDICINE

## 2025-06-09 PROCEDURE — 1100000001 HC PRIVATE ROOM DAILY

## 2025-06-09 RX ORDER — AMLODIPINE BESYLATE 5 MG/1
5 TABLET ORAL DAILY
Status: DISCONTINUED | OUTPATIENT
Start: 2025-06-09 | End: 2025-06-10

## 2025-06-09 RX ADMIN — THIAMINE HCL TAB 100 MG 100 MG: 100 TAB at 08:24

## 2025-06-09 RX ADMIN — ONDANSETRON HYDROCHLORIDE 4 MG: 4 TABLET, FILM COATED ORAL at 15:13

## 2025-06-09 RX ADMIN — HYDRALAZINE HYDROCHLORIDE 25 MG: 25 TABLET ORAL at 20:25

## 2025-06-09 RX ADMIN — Medication 1 TABLET: at 08:24

## 2025-06-09 RX ADMIN — KETOROLAC TROMETHAMINE 15 MG: 15 INJECTION, SOLUTION INTRAMUSCULAR; INTRAVENOUS at 12:22

## 2025-06-09 RX ADMIN — HYDRALAZINE HYDROCHLORIDE 25 MG: 25 TABLET ORAL at 08:24

## 2025-06-09 RX ADMIN — FOLIC ACID 1 MG: 1 TABLET ORAL at 08:24

## 2025-06-09 RX ADMIN — OXYCODONE HYDROCHLORIDE 5 MG: 5 TABLET ORAL at 08:24

## 2025-06-09 RX ADMIN — AMLODIPINE BESYLATE 5 MG: 5 TABLET ORAL at 15:11

## 2025-06-09 RX ADMIN — OXYCODONE HYDROCHLORIDE 5 MG: 5 TABLET ORAL at 15:13

## 2025-06-09 RX ADMIN — OXYCODONE HYDROCHLORIDE 5 MG: 5 TABLET ORAL at 21:37

## 2025-06-09 RX ADMIN — HYDROMORPHONE HYDROCHLORIDE 0.2 MG: 1 INJECTION, SOLUTION INTRAMUSCULAR; INTRAVENOUS; SUBCUTANEOUS at 02:26

## 2025-06-09 ASSESSMENT — COGNITIVE AND FUNCTIONAL STATUS - GENERAL
DAILY ACTIVITIY SCORE: 24
MOBILITY SCORE: 24

## 2025-06-09 ASSESSMENT — LIFESTYLE VARIABLES
AGITATION: NORMAL ACTIVITY
PULSE: 90
TOTAL SCORE: 0
AGITATION: NORMAL ACTIVITY
BLOOD PRESSURE: 147/110
NAUSEA AND VOMITING: NO NAUSEA AND NO VOMITING
TOTAL SCORE: 0
TREMOR: NO TREMOR
ANXIETY: MILDLY ANXIOUS
TREMOR: NO TREMOR
PAROXYSMAL SWEATS: NO SWEAT VISIBLE
TOTAL SCORE: 2
ORIENTATION AND CLOUDING OF SENSORIUM: ORIENTED AND CAN DO SERIAL ADDITIONS
ORIENTATION AND CLOUDING OF SENSORIUM: ORIENTED AND CAN DO SERIAL ADDITIONS
PAROXYSMAL SWEATS: NO SWEAT VISIBLE
PAROXYSMAL SWEATS: NO SWEAT VISIBLE
ORIENTATION AND CLOUDING OF SENSORIUM: ORIENTED AND CAN DO SERIAL ADDITIONS
NAUSEA AND VOMITING: NO NAUSEA AND NO VOMITING
VISUAL DISTURBANCES: NOT PRESENT
AUDITORY DISTURBANCES: NOT PRESENT
AUDITORY DISTURBANCES: NOT PRESENT
HEADACHE, FULLNESS IN HEAD: NOT PRESENT
TREMOR: NO TREMOR
VISUAL DISTURBANCES: NOT PRESENT
AUDITORY DISTURBANCES: NOT PRESENT
HEADACHE, FULLNESS IN HEAD: NOT PRESENT
HEADACHE, FULLNESS IN HEAD: NOT PRESENT
NAUSEA AND VOMITING: NO NAUSEA AND NO VOMITING
ANXIETY: NO ANXIETY, AT EASE
AGITATION: SOMEWHAT MORE THAN NORMAL ACTIVITY
VISUAL DISTURBANCES: NOT PRESENT
ANXIETY: NO ANXIETY, AT EASE

## 2025-06-09 ASSESSMENT — PAIN - FUNCTIONAL ASSESSMENT
PAIN_FUNCTIONAL_ASSESSMENT: 0-10

## 2025-06-09 ASSESSMENT — PAIN DESCRIPTION - DESCRIPTORS
DESCRIPTORS: ACHING
DESCRIPTORS: ACHING
DESCRIPTORS: ACHING;STABBING
DESCRIPTORS: ACHING;STABBING

## 2025-06-09 ASSESSMENT — PAIN DESCRIPTION - LOCATION
LOCATION: ABDOMEN

## 2025-06-09 ASSESSMENT — PAIN SCALES - GENERAL
PAINLEVEL_OUTOF10: 8
PAINLEVEL_OUTOF10: 6
PAINLEVEL_OUTOF10: 6
PAINLEVEL_OUTOF10: 1
PAINLEVEL_OUTOF10: 3
PAINLEVEL_OUTOF10: 8
PAINLEVEL_OUTOF10: 9
PAINLEVEL_OUTOF10: 3
PAINLEVEL_OUTOF10: 5 - MODERATE PAIN
PAINLEVEL_OUTOF10: 4

## 2025-06-09 NOTE — CARE PLAN
The patient's goals for the shift include remain stable    The clinical goals for the shift include pt pain will be managed this shift    Over the shift, the patient did not make progress toward the following goals. Barriers to progression include patient waiting to long to report pain. Recommendations to address these barriers include pt educating on timely reporting of pain for effective pain mangement.

## 2025-06-09 NOTE — PROGRESS NOTES
Transitional Care Coordination Progress Note:  Patient discussed during interdisciplinary rounds.   Team members present: MD, KAROL  Plan per Medical/Surgical team: Alcohol-induced acute pancreatitis  Payor: [employer]  Discharge disposition: Home  Potential Barriers: medical  ADOD: 1-2 days  Per MD, patient with elevated blood pressure. Will continue to follow for discharge planning needs.    Zabrina MAXWELL, RN  Transitional Care Coordinator (TCC)  [phone]

## 2025-06-09 NOTE — PROGRESS NOTES
Arlen Braga is a 29 y.o. female on day 3 of admission presenting with Alcohol-induced acute pancreatitis, unspecified complication status ([facility]-HCC).      Subjective   Patient was seen and examined bedside this morning, stated that she tolerated full liquid with breakfast, and requested advancing diet to regular at lunch in anticipation for possible discharge.  After lunch, patient had an episode of emesis, and recurrent pain.  Additionally patient started running high BP in the early afternoon stated that she has been told that she may have high blood pressure, but has not been treated thus far.  We discussed importance of BP control to avoid complication of her blood pressure, such as nephropathy or cardiomyopathy or worse CVA.  Patient expressed verbal understanding, and was agreeable to lifestyle modification, following DASH diet and maintaining active lifestyle.  Patient also looking into home remedies as adjunct therapy.       Objective     Last Recorded Vitals  BP (!) 166/117   Pulse 80   Temp 36.4 °C (97.5 °F)   Resp 16   Wt 93 kg (205 lb)   SpO2 100%   Intake/Output last 3 Shifts:    Intake/Output Summary (Last 24 hours) at 6/9/2025 1626  Last data filed at 6/9/2025 0845  Gross per 24 hour   Intake 1000 ml   Output --   Net 1000 ml       Admission Weight  Weight: 93 kg (205 lb) (06/06/25 1459)    Daily Weight  06/06/25 : 93 kg (205 lb)    Image Results  CT abdomen pelvis w IV contrast  Narrative: Interpreted By:  Finkelstein, Evan,  and Racquel Lemos   STUDY:  CT ABDOMEN PELVIS W IV CONTRAST;  6/6/2025 5:41 pm      INDICATION:  Signs/Symptoms:epigastric/luq abd pain with radiation to l flank; hx  pancraetitis.      COMPARISON:  CT abdomen and pelvis 03/30/2023.      ACCESSION NUMBER(S):  [id number]      ORDERING CLINICIAN:  LUIS COPPOLA      TECHNIQUE:  Contiguous axial images of the abdomen and pelvis were obtained after  the intravenous administration of iodinated contrast. Coronal and  sagittal  reformatted images were reconstructed from the axial data.      FINDINGS:  LOWER CHEST: No focal pulmonary consolidations. No pleural effusions  or pneumothorax seen. The heart is normal in size. The visualized  distal esophagus is unremarkable.          ABDOMEN/PELVIS:      ABDOMINAL WALL: No significant abnormality.      LIVER: Several scattered subcentimeter hypodensities throughout the  liver parenchyma, too small to characterize. The liver is normal in  size.      BILE DUCTS: No significant intrahepatic or extrahepatic dilatation.      GALLBLADDER: The gallbladder is mildly distended with a small amount  of layering biliary sludge. No other findings to suggest acute  cholecystitis such as gallbladder wall thickening or surrounding  pericholecystic fluid.      PANCREAS: The body and tail of the pancreas appear edematous with  surrounding fat stranding and several adjacent subcentimeter  peripancreatic lymph nodes. The pancreas demonstrates normal  enhancement. No adjacent loculated fluid collections. The adjacent  vasculature appears patent.      SPLEEN: No significant abnormality.      ADRENALS: No significant abnormality.      KIDNEYS, URETERS, BLADDER: The bilateral kidneys are normal in size  and enhance symmetrically. No evidence of hydroureteronephrosis or  nephroureterolithiasis. The urinary bladder is unremarkable.      REPRODUCTIVE ORGANS: The uterus is present.      VESSELS: No significant abnormality.      RETROPERITONEUM/LYMPH NODES: Multiple mesenteric lymph nodes  throughout the left mid abdomen. For example 0.6 cm lymph node within  the left-sided mesentery (series 201, image 65). No acute  retroperitoneal abnormality.      BOWEL/MESENTERY/PERITONEUM: The stomach is unremarkable. No bowel  dilatation. Wall thickening versus underdistention throughout the  colon.. The appendix is not definitively visualized, but no evidence  of pericecal fat stranding or fluid.      Small amount of left-sided  mesenteric edema in the area of  lymphadenopathy. No loculated fluid collections. No evidence of free  air.          MUSCULOSKELETAL: No acute osseous abnormality. No suspicious osseous  lesion.      Impression: 1. There is edema of the body and tail of the pancreas with  surrounding fat stranding. There is normal enhancement of the  pancreas. Findings are consistent with acute interstitial  pancreatitis. Within the mesentery inferior to the pancreas, there is  an area of fat stranding and multiple prominent lymph nodes within  the mesentery. These findings are favored to be reactive to the  adjacent pancreatitis. However, recommend repeat imaging after  appropriate treatment to document resolution.  2. The gallbladder is mildly distended with a small amount of  layering biliary sludge. No findings to suggest acute cholecystitis  such as gallbladder wall thickening or surrounding pericholecystic  fluid.  3. Underdistention versus wall thickening throughout the colon.  Correlate for symptoms of colitis.          I personally reviewed the images/study and resident's interpretation  and I agree with the findings as stated by Aminta Clement MD (resident  radiologist). This study was analyzed and interpreted at [place], [place].      MACRO:  None.      Signed by: Evan Finkelstein 6/6/2025 7:07 PM  Dictation workstation:   [id number]      Physical Exam  Constitutional: Alert active, cooperative, in mild distress for pain  Eyes: PERRLA, clear sclera  ENMT: Moist mucosal membranes, no exudate  Head / Neck: Atraumatic, normocephalic, supple neck, JVP not visualized  Lungs: Patent airways, CTABL  Heart: RRR, S1S2, no murmurs appreciated, palpable pulses in all extremities  GI: Soft, tender to palpation in the epigastrium, slightly tympanic, bowel sounds present in all quadrants  MSK: Moves all extremities freely, no restriction  of ROM, no joint edema  Extremities: Intact x 4,  no peripheral edema  : No Mckenna catheter inserted  Breast: Deferred  Neurological: AAO x 3 to person, place and date, facial muscles symmetrical, sensation intact, strength 4/4, no acute focal neurological deficits appreciated  Psychological: Appropriate mood and behavior  Relevant Results                    Scheduled medications  Scheduled Medications[1]  Continuous medications  Continuous Medications[2]  PRN medications  PRN Medications[3]            Assessment & Plan  Alcohol-induced acute pancreatitis, unspecified complication status ([facility]-HCC)    29 y.o. female with PMH of alcoholic pancreatitis, asthma, HTN presenting today for abdominal pain for one day, patient reported pain started yesterday in her left upper quadrant area. Pain is constant radiate to her epigastric area 10/10 in severity. Patient denies dysuria, hematuria, increased urinary frequency. Denies fevers though endorses subjective chills. States that she does drink alcohol daily. Notes that she drinks 2 to 4 12 ounce seltzers daily       Acute alcoholic pancreatitis, recurrent  - Elevated lipase, abdominal pain radiating to back that started after drinking alcohol--> c/w acute pancreatitis  - b-hcg negative,  -CT scan suggestive of acute pancreatitis  -Initially unable to tolerate clear liquid diet, due to postprandial pain  -Able to to try again after initiation of Toradol  - Pain control with oxycodone 5mg q6h prn with breakthrough hydromorphone 0.2 q6h prn   - tylenol q6 hours PRN   -Toradol 15 mg IV push every 6 hours as needed severe pain or breakthrough pain  - Daily CBC, CMP     Alcohol use disorder  - No active w/d signs  - CIWA with lorazepam  - Thiamin 100mg daily   - multivitamin     Elevated BP without diagnosis of hypertension  -Reported history of elevated BP during doctor's visits, admits to being told of having hypertension, but has not been treated yet.  -Start amlodipine 5 mg daily  -Educated on DASH diet and lifestyle  modifications     Diet  - Clear liquid ---> full liquid ----> Regular     DVT prophylaxis  -Lovenox 40 mg subcu daily     Disposition: Presenting with epigastric pain, found with acute pancreatitis, need further management, discharge pending clinical improvement.     Anticipated discharge tomorrow if symptoms resolve and tolerating diet.           Reginald Reyes DO           [1] amLODIPine, 5 mg, oral, Daily  enoxaparin, 40 mg, subcutaneous, q24h  folic acid, 1 mg, oral, Daily  hydrALAZINE, 25 mg, oral, BID  multivitamin with minerals, 1 tablet, oral, Daily  thiamine, 100 mg, oral, Daily  [2]    [3] PRN medications: acetaminophen **OR** acetaminophen **OR** acetaminophen, alum-mag hydroxide-simeth, HYDROmorphone, ketorolac, LORazepam **OR** LORazepam **OR** LORazepam, ondansetron **OR** ondansetron, oxyCODONE

## 2025-06-10 ENCOUNTER — PHARMACY VISIT (OUTPATIENT)
Dept: PHARMACY | Facility: CLINIC | Age: 30
End: 2025-06-10
Payer: MEDICAID

## 2025-06-10 VITALS
BODY MASS INDEX: 35 KG/M2 | RESPIRATION RATE: 16 BRPM | WEIGHT: 205 LBS | OXYGEN SATURATION: 100 % | TEMPERATURE: 97.5 F | DIASTOLIC BLOOD PRESSURE: 95 MMHG | HEIGHT: 64 IN | HEART RATE: 86 BPM | SYSTOLIC BLOOD PRESSURE: 139 MMHG

## 2025-06-10 PROBLEM — K85.20 ALCOHOL-INDUCED ACUTE PANCREATITIS, UNSPECIFIED COMPLICATION STATUS (HHS-HCC): Status: RESOLVED | Noted: 2025-06-06 | Resolved: 2025-06-10

## 2025-06-10 LAB
ALBUMIN SERPL BCP-MCNC: 5 G/DL (ref 3.4–5)
ALP SERPL-CCNC: 89 U/L (ref 33–110)
ALT SERPL W P-5'-P-CCNC: 17 U/L (ref 7–45)
ANION GAP SERPL CALC-SCNC: 16 MMOL/L (ref 10–20)
AST SERPL W P-5'-P-CCNC: 29 U/L (ref 9–39)
BASOPHILS # BLD AUTO: 0.03 X10*3/UL (ref 0–0.1)
BASOPHILS NFR BLD AUTO: 0.5 %
BILIRUB SERPL-MCNC: 0.6 MG/DL (ref 0–1.2)
BUN SERPL-MCNC: 7 MG/DL (ref 6–23)
CALCIUM SERPL-MCNC: 9.7 MG/DL (ref 8.6–10.6)
CHLORIDE SERPL-SCNC: 103 MMOL/L (ref 98–107)
CO2 SERPL-SCNC: 24 MMOL/L (ref 21–32)
CREAT SERPL-MCNC: 0.71 MG/DL (ref 0.5–1.05)
EGFRCR SERPLBLD CKD-EPI 2021: >90 ML/MIN/1.73M*2
EOSINOPHIL # BLD AUTO: 0.1 X10*3/UL (ref 0–0.7)
EOSINOPHIL NFR BLD AUTO: 1.7 %
ERYTHROCYTE [DISTWIDTH] IN BLOOD BY AUTOMATED COUNT: 12.4 % (ref 11.5–14.5)
GLUCOSE SERPL-MCNC: 85 MG/DL (ref 74–99)
HCT VFR BLD AUTO: 46.2 % (ref 36–46)
HGB BLD-MCNC: 15 G/DL (ref 12–16)
IMM GRANULOCYTES # BLD AUTO: 0.01 X10*3/UL (ref 0–0.7)
IMM GRANULOCYTES NFR BLD AUTO: 0.2 % (ref 0–0.9)
LYMPHOCYTES # BLD AUTO: 2.74 X10*3/UL (ref 1.2–4.8)
LYMPHOCYTES NFR BLD AUTO: 47.2 %
MCH RBC QN AUTO: 32.5 PG (ref 26–34)
MCHC RBC AUTO-ENTMCNC: 32.5 G/DL (ref 32–36)
MCV RBC AUTO: 100 FL (ref 80–100)
MONOCYTES # BLD AUTO: 0.43 X10*3/UL (ref 0.1–1)
MONOCYTES NFR BLD AUTO: 7.4 %
NEUTROPHILS # BLD AUTO: 2.5 X10*3/UL (ref 1.2–7.7)
NEUTROPHILS NFR BLD AUTO: 43 %
NRBC BLD-RTO: 0 /100 WBCS (ref 0–0)
PLATELET # BLD AUTO: 202 X10*3/UL (ref 150–450)
POTASSIUM SERPL-SCNC: 3.5 MMOL/L (ref 3.5–5.3)
PROT SERPL-MCNC: 8 G/DL (ref 6.4–8.2)
RBC # BLD AUTO: 4.62 X10*6/UL (ref 4–5.2)
SODIUM SERPL-SCNC: 139 MMOL/L (ref 136–145)
WBC # BLD AUTO: 5.8 X10*3/UL (ref 4.4–11.3)

## 2025-06-10 PROCEDURE — RXMED WILLOW AMBULATORY MEDICATION CHARGE

## 2025-06-10 PROCEDURE — 36415 COLL VENOUS BLD VENIPUNCTURE: CPT

## 2025-06-10 PROCEDURE — 80053 COMPREHEN METABOLIC PANEL: CPT | Performed by: INTERNAL MEDICINE

## 2025-06-10 PROCEDURE — 99239 HOSP IP/OBS DSCHRG MGMT >30: CPT | Performed by: INTERNAL MEDICINE

## 2025-06-10 PROCEDURE — 2500000004 HC RX 250 GENERAL PHARMACY W/ HCPCS (ALT 636 FOR OP/ED): Mod: JZ,SE | Performed by: INTERNAL MEDICINE

## 2025-06-10 PROCEDURE — 2500000001 HC RX 250 WO HCPCS SELF ADMINISTERED DRUGS (ALT 637 FOR MEDICARE OP): Mod: SE | Performed by: INTERNAL MEDICINE

## 2025-06-10 PROCEDURE — 2500000001 HC RX 250 WO HCPCS SELF ADMINISTERED DRUGS (ALT 637 FOR MEDICARE OP): Mod: SE

## 2025-06-10 PROCEDURE — 85025 COMPLETE CBC W/AUTO DIFF WBC: CPT

## 2025-06-10 RX ORDER — OXYCODONE HYDROCHLORIDE 5 MG/1
5 TABLET ORAL EVERY 6 HOURS PRN
Qty: 12 TABLET | Refills: 0 | Status: SHIPPED | OUTPATIENT
Start: 2025-06-10 | End: 2025-06-13

## 2025-06-10 RX ORDER — ALBUTEROL SULFATE 90 UG/1
2 INHALANT RESPIRATORY (INHALATION) EVERY 4 HOURS PRN
Qty: 8.5 G | Refills: 0 | Status: SHIPPED | OUTPATIENT
Start: 2025-06-10

## 2025-06-10 RX ORDER — AMLODIPINE BESYLATE 10 MG/1
10 TABLET ORAL DAILY
Qty: 30 TABLET | Refills: 0 | Status: SHIPPED | OUTPATIENT
Start: 2025-06-11 | End: 2025-07-11

## 2025-06-10 RX ORDER — HYDRALAZINE HYDROCHLORIDE 50 MG/1
50 TABLET, FILM COATED ORAL 2 TIMES DAILY
Status: DISCONTINUED | OUTPATIENT
Start: 2025-06-10 | End: 2025-06-10 | Stop reason: HOSPADM

## 2025-06-10 RX ORDER — HYDRALAZINE HYDROCHLORIDE 25 MG/1
25 TABLET, FILM COATED ORAL ONCE
Status: COMPLETED | OUTPATIENT
Start: 2025-06-10 | End: 2025-06-10

## 2025-06-10 RX ORDER — HYDRALAZINE HYDROCHLORIDE 100 MG/1
100 TABLET, FILM COATED ORAL 2 TIMES DAILY
Qty: 60 TABLET | Refills: 0 | Status: SHIPPED | OUTPATIENT
Start: 2025-06-10

## 2025-06-10 RX ORDER — FLUTICASONE PROPIONATE 220 UG/1
2 AEROSOL, METERED RESPIRATORY (INHALATION)
Qty: 12 G | Refills: 5 | Status: SHIPPED | OUTPATIENT
Start: 2025-06-10

## 2025-06-10 RX ORDER — HYDRALAZINE HYDROCHLORIDE 50 MG/1
50 TABLET, FILM COATED ORAL ONCE
Status: COMPLETED | OUTPATIENT
Start: 2025-06-10 | End: 2025-06-10

## 2025-06-10 RX ORDER — LANOLIN ALCOHOL/MO/W.PET/CERES
100 CREAM (GRAM) TOPICAL DAILY
Qty: 30 TABLET | Refills: 0 | Status: SHIPPED | OUTPATIENT
Start: 2025-06-11 | End: 2025-07-11

## 2025-06-10 RX ORDER — AMLODIPINE BESYLATE 5 MG/1
5 TABLET ORAL ONCE
Status: COMPLETED | OUTPATIENT
Start: 2025-06-10 | End: 2025-06-10

## 2025-06-10 RX ORDER — AMLODIPINE BESYLATE 10 MG/1
10 TABLET ORAL DAILY
Status: DISCONTINUED | OUTPATIENT
Start: 2025-06-11 | End: 2025-06-10 | Stop reason: HOSPADM

## 2025-06-10 RX ORDER — ONDANSETRON 4 MG/1
4 TABLET, FILM COATED ORAL EVERY 8 HOURS PRN
Qty: 12 TABLET | Refills: 0 | Status: SHIPPED | OUTPATIENT
Start: 2025-06-10 | End: 2025-06-14

## 2025-06-10 RX ADMIN — AMLODIPINE BESYLATE 5 MG: 5 TABLET ORAL at 08:17

## 2025-06-10 RX ADMIN — HYDRALAZINE HYDROCHLORIDE 25 MG: 25 TABLET ORAL at 10:03

## 2025-06-10 RX ADMIN — HYDRALAZINE HYDROCHLORIDE 50 MG: 50 TABLET ORAL at 14:02

## 2025-06-10 RX ADMIN — FOLIC ACID 1 MG: 1 TABLET ORAL at 08:17

## 2025-06-10 RX ADMIN — THIAMINE HCL TAB 100 MG 100 MG: 100 TAB at 08:17

## 2025-06-10 RX ADMIN — KETOROLAC TROMETHAMINE 15 MG: 15 INJECTION, SOLUTION INTRAMUSCULAR; INTRAVENOUS at 10:10

## 2025-06-10 RX ADMIN — HYDRALAZINE HYDROCHLORIDE 25 MG: 25 TABLET ORAL at 08:17

## 2025-06-10 RX ADMIN — Medication 1 TABLET: at 08:16

## 2025-06-10 RX ADMIN — AMLODIPINE BESYLATE 5 MG: 5 TABLET ORAL at 10:03

## 2025-06-10 RX ADMIN — OXYCODONE HYDROCHLORIDE 5 MG: 5 TABLET ORAL at 06:04

## 2025-06-10 ASSESSMENT — LIFESTYLE VARIABLES
AUDITORY DISTURBANCES: NOT PRESENT
ANXIETY: NO ANXIETY, AT EASE
ORIENTATION AND CLOUDING OF SENSORIUM: ORIENTED AND CAN DO SERIAL ADDITIONS
HEADACHE, FULLNESS IN HEAD: NOT PRESENT
TOTAL SCORE: 0
VISUAL DISTURBANCES: NOT PRESENT
ORIENTATION AND CLOUDING OF SENSORIUM: ORIENTED AND CAN DO SERIAL ADDITIONS
TREMOR: NO TREMOR
AGITATION: NORMAL ACTIVITY
NAUSEA AND VOMITING: NO NAUSEA AND NO VOMITING
AGITATION: NORMAL ACTIVITY
TOTAL SCORE: 0
HEADACHE, FULLNESS IN HEAD: NOT PRESENT
NAUSEA AND VOMITING: NO NAUSEA AND NO VOMITING
AUDITORY DISTURBANCES: NOT PRESENT
TREMOR: NO TREMOR
PAROXYSMAL SWEATS: NO SWEAT VISIBLE
VISUAL DISTURBANCES: NOT PRESENT
ANXIETY: NO ANXIETY, AT EASE
PAROXYSMAL SWEATS: NO SWEAT VISIBLE

## 2025-06-10 ASSESSMENT — COGNITIVE AND FUNCTIONAL STATUS - GENERAL
MOBILITY SCORE: 24
DAILY ACTIVITIY SCORE: 24

## 2025-06-10 ASSESSMENT — PAIN SCALES - GENERAL
PAINLEVEL_OUTOF10: 6
PAINLEVEL_OUTOF10: 0 - NO PAIN
PAINLEVEL_OUTOF10: 7
PAINLEVEL_OUTOF10: 0 - NO PAIN

## 2025-06-10 ASSESSMENT — PAIN DESCRIPTION - LOCATION: LOCATION: ABDOMEN

## 2025-06-10 ASSESSMENT — PAIN - FUNCTIONAL ASSESSMENT
PAIN_FUNCTIONAL_ASSESSMENT: 0-10

## 2025-06-10 ASSESSMENT — PAIN DESCRIPTION - DESCRIPTORS: DESCRIPTORS: ACHING

## 2025-06-10 NOTE — PROGRESS NOTES
"Pharmacy Medication History Review    Arlen Braga is a 29 y.o. female admitted for Alcohol-induced acute pancreatitis, unspecified complication status ([facility]). Pharmacy reviewed the patient's sahpf-ty-pafbsbrwk medications and allergies for accuracy.    Medications ADDED:  None  Medications CHANGED:  None  Medications REMOVED:   None     The list below reflects the updated PTA list.   Prior to Admission Medications   Prescriptions Last Dose   naltrexone ER (VivitroL) injection Not Taking   Sig: Inject 4 mL (380 mg) into the muscle every 28 (twenty-eight) days.   Patient not taking: Reported on 6/6/2025      Facility-Administered Medications: None        The list below reflects the updated allergy list. Please review each documented allergy for additional clarification and justification.  Allergies  Reviewed by JEAN CARLOS Redd on 6/9/2025   No Known Allergies         Patient accepts M2B at discharge.     Sources:   [facility]  Pharmacy dispense history  Patient Interview Good historian  Chart Review  Care Everywhere    Additional Comments:  Patient confirms not currently taking any RX or OTC medications at home prior to admission      Jama Merrill, PharmD  Transitions of Care Pharmacist  06/10/25     Secure Chat preferred   If no response call [id number] or OVIAera \"Med Rec\"    "

## 2025-06-10 NOTE — DISCHARGE SUMMARY
Discharge Diagnosis  Alcohol-induced acute pancreatitis, unspecified complication status ([facility]-HCC)   Acute intermittent asthma  Primary hypertension  Alcohol abuse/withdrawal        Issues Requiring Follow-Up      Discharge Meds     Medication List      START taking these medications     albuterol 90 mcg/actuation inhaler; Commonly known as: ProAir HFA;   Inhale 2 puffs every 4 hours if needed for wheezing or shortness of   breath.   amLODIPine 10 mg tablet; Commonly known as: Norvasc; Take 1 tablet (10   mg) by mouth once daily. Do not fill before June 11, 2025.; Start taking   on: June 11, 2025   fluticasone 220 mcg/actuation inhaler; Commonly known as: Flovent;   Inhale 2 puffs 2 times a day. Rinse mouth with water after use to reduce   aftertaste and incidence of candidiasis. Do not swallow.   hydrALAZINE 100 mg tablet; Commonly known as: Apresoline; Take 1 tablet   (100 mg) by mouth 2 times a day.   ondansetron 4 mg tablet; Commonly known as: Zofran; Take 1 tablet (4 mg)   by mouth every 8 hours if needed for nausea or vomiting for up to 4 days.   oxyCODONE 5 mg immediate release tablet; Commonly known as: Roxicodone;   Take 1 tablet (5 mg) by mouth every 6 hours if needed for severe pain (7 -   10) for up to 3 days.   thiamine 100 mg tablet; Commonly known as: Vitamin B-1; Take 1 tablet   (100 mg) by mouth once daily.; Start taking on: June 11, 2025     CONTINUE taking these medications     VivitroL injection; Generic drug: naltrexone ER; Inject 4 mL (380 mg)   into the muscle every 28 (twenty-eight) days.       Test Results Pending At Discharge  Pending Labs       No current pending labs.            Hospital Course  Arlen Braga is a 29 y.o. female with PMH of alcoholic pancreatitis, asthma, HTN presenting today for abdominal pain for one day, patient reported pain started yesterday in her left upper quadrant area. Pain is constant radiate to her epigastric area 10/10 in severity. Patient denies dysuria,  hematuria, increased urinary frequency. Denies fevers though endorses subjective chills. States that she does drink alcohol daily. Notes that she drinks 2 to 4 12 ounce seltzers daily. Patient with history of pancreatitis. In the ED patient with sever pain, CBC without white count. CMP showing no electrolyte abnormalities, MARIO, transaminitis. Lipase is 89. UA negative for infection. Pregnancy test negative. Wet read of CT revealing findings consistent with pancreatitis without complication. There are surrounding areas of fat stranding and lymph nodes that radiology recommends repeat imaging after appropriate treatment to document resolution. Some gallbladder sludge without evidence of cholecystitis. Pt received pain meds, iv fluids and zofran with slight improvement in pain, trial of PO challenge was failled. Patient to be admitted to medicine for further evaluation and management.   6/7: Patient was seen and examined.  Still complaining of nausea and increasing abdominal pain on attempting clear liquid diet.  I will downgrade patient back to n.p.o. with meds with sips start IV fluids D5 LR at 150 mL/h.  Continue pain control and antiemetics as ordered.  Repeat CBC CMP and lipase in a.m.  Consider advancing diet to liquid diet at breakfast or lunch tomorrow  6/10: Patient was seen and examined.  She feels clinically better abdominal pain is now subsided.  Blood pressure remains markedly elevated so antihypertensives were adjusted.  Patient will be discharged on oral antihypertensive to follow-up with outpatient with primary care physician within 1 week of discharge.  A referral was sent for patient make an appointment with a primary care physician.      The discharge process took about 35 minutes.    Pertinent Physical Exam At Time of Discharge  Physical Exam  Vitals:    06/10/25 1300   BP: (!) 139/95   Pulse:    Resp:    Temp:    SpO2:      Constitutional: Alert active, cooperative, in mild distress for pain  Eyes:  PERRLA, clear sclera  ENMT: Moist mucosal membranes, no exudate  Head / Neck: Atraumatic, normocephalic, supple neck, JVP not visualized  Lungs: Patent airways, CTABL  Heart: RRR, S1S2, no murmurs appreciated, palpable pulses in all extremities  GI: Soft, tender to palpation in the epigastrium, slightly tympanic, bowel sounds present in all quadrants  MSK: Moves all extremities freely, no restriction  of ROM, no joint edema  Extremities: Intact x 4, no peripheral edema  : No Mckenna catheter inserted  Breast: Deferred  Neurological: AAO x 3 to person, place and date, facial muscles symmetrical, sensation intact, strength 4/4, no acute focal neurological deficits appreciated  Psychological: Appropriate mood and behavior  Outpatient Follow-Up  No future appointments.      Zeenat Qureshi MD

## 2025-06-10 NOTE — CARE PLAN
The patient's goals for the shift include Pt will have decreased pain    The clinical goals for the shift include pt will manage pain andf symptoms      Problem: Pain - Adult  Goal: Verbalizes/displays adequate comfort level or baseline comfort level  6/10/2025 0236 by Luis Palmer, KENRICKN  Outcome: Progressing  6/10/2025 0235 by Luis Palmer, LPN  Outcome: Progressing  6/10/2025 0234 by Luis Palmer LPN  Outcome: Progressing     Problem: Safety - Adult  Goal: Free from fall injury  6/10/2025 0236 by Luis Palmer LPN  Outcome: Progressing  6/10/2025 0235 by Luis Palmer, LPN  Outcome: Progressing  6/10/2025 0234 by Luis Palmer LPN  Outcome: Progressing     Problem: Discharge Planning  Goal: Discharge to home or other facility with appropriate resources  6/10/2025 0236 by Luis Palmer LPN  Outcome: Progressing  6/10/2025 0235 by Luis Palmer, LPN  Outcome: Progressing  6/10/2025 0234 by Luis Palmer LPN  Outcome: Progressing     Problem: Chronic Conditions and Co-morbidities  Goal: Patient's chronic conditions and co-morbidity symptoms are monitored and maintained or improved  6/10/2025 0236 by Luis Palmer, LPN  Outcome: Progressing  6/10/2025 0235 by Luis Palmer, LPN  Outcome: Progressing  6/10/2025 0234 by Luis Palmer LPN  Outcome: Progressing     Problem: Nutrition  Goal: Nutrient intake appropriate for maintaining nutritional needs  6/10/2025 0236 by Luis Palmer LPN  Outcome: Progressing  6/10/2025 0235 by Luis Palmer LPN  Outcome: Progressing  6/10/2025 0234 by Luis Palmer LPN  Outcome: Progressing     Problem: Fall/Injury  Goal: Not fall by end of shift  6/10/2025 0236 by Luis Palmer, LPN  Outcome: Progressing  6/10/2025 0235 by Luis Palmer, LPN  Outcome: Progressing  6/10/2025 0234 by Luis  Brit Palmer, LPN  Outcome: Progressing  Goal: Be free from injury by end of the shift  6/10/2025 0236 by Luis Palmer, LPN  Outcome: Progressing  6/10/2025 0235 by Luis Palmer, LPN  Outcome: Progressing  6/10/2025 0234 by Luis Palmer, LPN  Outcome: Progressing  Goal: Verbalize understanding of personal risk factors for fall in the hospital  6/10/2025 0236 by Luis Palmer, LPN  Outcome: Progressing  6/10/2025 0235 by Luis Palmer, LPN  Outcome: Progressing  6/10/2025 0234 by Luis Palmer, LPN  Outcome: Progressing  Goal: Verbalize understanding of risk factor reduction measures to prevent injury from fall in the home  6/10/2025 0236 by Luis Palmer, LPN  Outcome: Progressing  6/10/2025 0235 by Luis Palmer, LPN  Outcome: Progressing  6/10/2025 0234 by Luis Palmer, LPN  Outcome: Progressing  Goal: Use assistive devices by end of the shift  6/10/2025 0236 by Luis Palmer, LPN  Outcome: Progressing  6/10/2025 0235 by Luis Palmer, LPN  Outcome: Progressing  6/10/2025 0234 by Luis Palmer, LPN  Outcome: Progressing  Goal: Pace activities to prevent fatigue by end of the shift  6/10/2025 0236 by Luis Palmer, LPN  Outcome: Progressing  6/10/2025 0235 by Luis Palmer, LPN  Outcome: Progressing  6/10/2025 0234 by Luis Palmer, LPN  Outcome: Progressing     Problem: Pain  Goal: Takes deep breaths with improved pain control throughout the shift  6/10/2025 0236 by Luis Palmer, LPN  Outcome: Progressing  6/10/2025 0235 by Luis Palmer, LPN  Outcome: Progressing  6/10/2025 0234 by Luis Palmer, LPN  Outcome: Progressing  Goal: Turns in bed with improved pain control throughout the shift  6/10/2025 0236 by Luis Palmer LPN  Outcome: Progressing  6/10/2025 0235 by Luis Palmer,  LPN  Outcome: Progressing  6/10/2025 0234 by Luis Palmer, LPN  Outcome: Progressing  Goal: Walks with improved pain control throughout the shift  6/10/2025 0236 by Luis Palmer, LPN  Outcome: Progressing  6/10/2025 0235 by Luis Palmer, LPN  Outcome: Progressing  6/10/2025 0234 by Luis Palmer, LPN  Outcome: Progressing  Goal: Performs ADL's with improved pain control throughout shift  6/10/2025 0236 by Luis Palmer, LPN  Outcome: Progressing  6/10/2025 0235 by Luis Palmer, LPN  Outcome: Progressing  6/10/2025 0234 by Luis Palmer, LPN  Outcome: Progressing  Goal: Participates in PT with improved pain control throughout the shift  6/10/2025 0236 by Luis Palmer, LPN  Outcome: Progressing  6/10/2025 0235 by Luis Palmer, LPN  Outcome: Progressing  6/10/2025 0234 by Luis Palmer, LPN  Outcome: Progressing  Goal: Free from opioid side effects throughout the shift  6/10/2025 0236 by Luis Palmer, LPN  Outcome: Progressing  6/10/2025 0235 by Luis Palmer, LPN  Outcome: Progressing  6/10/2025 0234 by Luis Palmer, LPN  Outcome: Progressing  Goal: Free from acute confusion related to pain meds throughout the shift  6/10/2025 0236 by Luis Palmer, LPN  Outcome: Progressing  6/10/2025 0235 by Luis Palmer, LPN  Outcome: Progressing  6/10/2025 0234 by Luis Palmer, LPN  Outcome: Progressing

## 2025-06-10 NOTE — CARE PLAN
Problem: Pain - Adult  Goal: Verbalizes/displays adequate comfort level or baseline comfort level  6/10/2025 0935 by Lea Watt RN  Outcome: Progressing  6/10/2025 0934 by Lea Watt RN  Outcome: Progressing     Problem: Safety - Adult  Goal: Free from fall injury  6/10/2025 0935 by Lea Watt RN  Outcome: Progressing  6/10/2025 0934 by Lea Watt RN  Outcome: Progressing     Problem: Discharge Planning  Goal: Discharge to home or other facility with appropriate resources  6/10/2025 0935 by Lea Watt RN  Outcome: Progressing  6/10/2025 0934 by Lea Watt RN  Outcome: Progressing     Problem: Chronic Conditions and Co-morbidities  Goal: Patient's chronic conditions and co-morbidity symptoms are monitored and maintained or improved  6/10/2025 0935 by Lea Watt RN  Outcome: Progressing  6/10/2025 0934 by Lea Watt RN  Outcome: Progressing     Problem: Nutrition  Goal: Nutrient intake appropriate for maintaining nutritional needs  6/10/2025 0935 by Lea Watt RN  Outcome: Progressing  6/10/2025 0934 by Lea Watt RN  Outcome: Progressing     Problem: Fall/Injury  Goal: Not fall by end of shift  6/10/2025 0935 by Lea Watt RN  Outcome: Progressing  6/10/2025 0934 by Lea Watt RN  Outcome: Progressing  Goal: Be free from injury by end of the shift  6/10/2025 0935 by Lea Watt RN  Outcome: Progressing  6/10/2025 0934 by Lea Watt RN  Outcome: Progressing  Goal: Verbalize understanding of personal risk factors for fall in the hospital  6/10/2025 0935 by Lea Watt RN  Outcome: Progressing  6/10/2025 0934 by Lea Watt RN  Outcome: Progressing  Goal: Verbalize understanding of risk factor reduction measures to prevent injury from fall in the home  6/10/2025 0935 by Lea Watt RN  Outcome: Progressing  6/10/2025 0934 by Lea Watt RN  Outcome: Progressing  Goal: Use assistive devices by end of  the shift  6/10/2025 0935 by Lea Watt RN  Outcome: Progressing  6/10/2025 0934 by Lea Watt RN  Outcome: Progressing  Goal: Pace activities to prevent fatigue by end of the shift  6/10/2025 0935 by Lea aWtt RN  Outcome: Progressing  6/10/2025 0934 by Lea Watt RN  Outcome: Progressing     Problem: Pain  Goal: Takes deep breaths with improved pain control throughout the shift  6/10/2025 0935 by Lea Watt RN  Outcome: Progressing  6/10/2025 0934 by Lea Watt RN  Outcome: Progressing  Goal: Turns in bed with improved pain control throughout the shift  6/10/2025 0935 by Lea Watt RN  Outcome: Progressing  6/10/2025 0934 by Lea Watt RN  Outcome: Progressing  Goal: Walks with improved pain control throughout the shift  6/10/2025 0935 by Lea Watt RN  Outcome: Progressing  6/10/2025 0934 by Lea Watt RN  Outcome: Progressing  Goal: Performs ADL's with improved pain control throughout shift  6/10/2025 0935 by Lea Watt RN  Outcome: Progressing  6/10/2025 0934 by Lea Watt RN  Outcome: Progressing  Goal: Participates in PT with improved pain control throughout the shift  6/10/2025 0935 by Lea Watt RN  Outcome: Progressing  6/10/2025 0934 by Lea Watt RN  Outcome: Progressing  Goal: Free from opioid side effects throughout the shift  6/10/2025 0935 by Lea Watt RN  Outcome: Progressing  6/10/2025 0934 by Lea Watt RN  Outcome: Progressing  Goal: Free from acute confusion related to pain meds throughout the shift  6/10/2025 0935 by Lea Watt RN  Outcome: Progressing  6/10/2025 0934 by Lea Watt RN  Outcome: Progressing   The patient's goals for the shift include Pt will have decreased pain    The clinical goals for the shift include pt will report decrease in pain and tolerate PO intake

## 2025-06-11 ENCOUNTER — CLINICAL SUPPORT (OUTPATIENT)
Dept: EMERGENCY MEDICINE | Facility: HOSPITAL | Age: 30
End: 2025-06-11
Payer: COMMERCIAL

## 2025-06-11 ENCOUNTER — HOSPITAL ENCOUNTER (EMERGENCY)
Facility: HOSPITAL | Age: 30
Discharge: HOME | End: 2025-06-12
Attending: STUDENT IN AN ORGANIZED HEALTH CARE EDUCATION/TRAINING PROGRAM
Payer: COMMERCIAL

## 2025-06-11 ENCOUNTER — DOCUMENTATION (OUTPATIENT)
Dept: CASE MANAGEMENT | Facility: HOSPITAL | Age: 30
End: 2025-06-11
Payer: COMMERCIAL

## 2025-06-11 ENCOUNTER — APPOINTMENT (OUTPATIENT)
Dept: RADIOLOGY | Facility: HOSPITAL | Age: 30
End: 2025-06-11
Payer: COMMERCIAL

## 2025-06-11 ENCOUNTER — PATIENT OUTREACH (OUTPATIENT)
Dept: CASE MANAGEMENT | Facility: HOSPITAL | Age: 30
End: 2025-06-11
Payer: COMMERCIAL

## 2025-06-11 DIAGNOSIS — R42 LIGHTHEADEDNESS: Primary | ICD-10-CM

## 2025-06-11 DIAGNOSIS — K52.9 ENTERITIS: ICD-10-CM

## 2025-06-11 LAB
ALBUMIN SERPL BCP-MCNC: 4.6 G/DL (ref 3.4–5)
ALP SERPL-CCNC: 79 U/L (ref 33–110)
ALT SERPL W P-5'-P-CCNC: 24 U/L (ref 7–45)
ANION GAP SERPL CALC-SCNC: 15 MMOL/L (ref 10–20)
AST SERPL W P-5'-P-CCNC: 25 U/L (ref 9–39)
BASOPHILS # BLD AUTO: 0.03 X10*3/UL (ref 0–0.1)
BASOPHILS NFR BLD AUTO: 0.5 %
BILIRUB SERPL-MCNC: 0.4 MG/DL (ref 0–1.2)
BUN SERPL-MCNC: 13 MG/DL (ref 6–23)
CALCIUM SERPL-MCNC: 9.6 MG/DL (ref 8.6–10.6)
CARDIAC TROPONIN I PNL SERPL HS: 12 NG/L (ref 0–34)
CHLORIDE SERPL-SCNC: 105 MMOL/L (ref 98–107)
CO2 SERPL-SCNC: 24 MMOL/L (ref 21–32)
CREAT SERPL-MCNC: 0.71 MG/DL (ref 0.5–1.05)
EGFRCR SERPLBLD CKD-EPI 2021: >90 ML/MIN/1.73M*2
EOSINOPHIL # BLD AUTO: 0.03 X10*3/UL (ref 0–0.7)
EOSINOPHIL NFR BLD AUTO: 0.5 %
ERYTHROCYTE [DISTWIDTH] IN BLOOD BY AUTOMATED COUNT: 12.5 % (ref 11.5–14.5)
GLUCOSE SERPL-MCNC: 86 MG/DL (ref 74–99)
HCT VFR BLD AUTO: 39.2 % (ref 36–46)
HGB BLD-MCNC: 13.3 G/DL (ref 12–16)
IMM GRANULOCYTES # BLD AUTO: 0.01 X10*3/UL (ref 0–0.7)
IMM GRANULOCYTES NFR BLD AUTO: 0.2 % (ref 0–0.9)
LYMPHOCYTES # BLD AUTO: 1.87 X10*3/UL (ref 1.2–4.8)
LYMPHOCYTES NFR BLD AUTO: 32 %
MAGNESIUM SERPL-MCNC: 2.02 MG/DL (ref 1.6–2.4)
MCH RBC QN AUTO: 32.4 PG (ref 26–34)
MCHC RBC AUTO-ENTMCNC: 33.9 G/DL (ref 32–36)
MCV RBC AUTO: 96 FL (ref 80–100)
MONOCYTES # BLD AUTO: 0.41 X10*3/UL (ref 0.1–1)
MONOCYTES NFR BLD AUTO: 7 %
NEUTROPHILS # BLD AUTO: 3.49 X10*3/UL (ref 1.2–7.7)
NEUTROPHILS NFR BLD AUTO: 59.8 %
NRBC BLD-RTO: 0 /100 WBCS (ref 0–0)
PLATELET # BLD AUTO: 188 X10*3/UL (ref 150–450)
POTASSIUM SERPL-SCNC: 3.9 MMOL/L (ref 3.5–5.3)
PROT SERPL-MCNC: 7.2 G/DL (ref 6.4–8.2)
RBC # BLD AUTO: 4.1 X10*6/UL (ref 4–5.2)
SODIUM SERPL-SCNC: 140 MMOL/L (ref 136–145)
TSH SERPL-ACNC: 0.68 MIU/L (ref 0.44–3.98)
WBC # BLD AUTO: 5.8 X10*3/UL (ref 4.4–11.3)

## 2025-06-11 PROCEDURE — 84484 ASSAY OF TROPONIN QUANT: CPT

## 2025-06-11 PROCEDURE — 36415 COLL VENOUS BLD VENIPUNCTURE: CPT

## 2025-06-11 PROCEDURE — 84443 ASSAY THYROID STIM HORMONE: CPT

## 2025-06-11 PROCEDURE — 85025 COMPLETE CBC W/AUTO DIFF WBC: CPT

## 2025-06-11 PROCEDURE — 71046 X-RAY EXAM CHEST 2 VIEWS: CPT | Performed by: RADIOLOGY

## 2025-06-11 PROCEDURE — 99285 EMERGENCY DEPT VISIT HI MDM: CPT | Mod: 25 | Performed by: STUDENT IN AN ORGANIZED HEALTH CARE EDUCATION/TRAINING PROGRAM

## 2025-06-11 PROCEDURE — 96361 HYDRATE IV INFUSION ADD-ON: CPT

## 2025-06-11 PROCEDURE — 2500000004 HC RX 250 GENERAL PHARMACY W/ HCPCS (ALT 636 FOR OP/ED): Mod: JZ,SE

## 2025-06-11 PROCEDURE — 96375 TX/PRO/DX INJ NEW DRUG ADDON: CPT

## 2025-06-11 PROCEDURE — 96374 THER/PROPH/DIAG INJ IV PUSH: CPT

## 2025-06-11 PROCEDURE — 81025 URINE PREGNANCY TEST: CPT

## 2025-06-11 PROCEDURE — 2500000001 HC RX 250 WO HCPCS SELF ADMINISTERED DRUGS (ALT 637 FOR MEDICARE OP): Mod: SE

## 2025-06-11 PROCEDURE — 83735 ASSAY OF MAGNESIUM: CPT

## 2025-06-11 PROCEDURE — 71046 X-RAY EXAM CHEST 2 VIEWS: CPT

## 2025-06-11 PROCEDURE — 84075 ASSAY ALKALINE PHOSPHATASE: CPT

## 2025-06-11 PROCEDURE — 93005 ELECTROCARDIOGRAM TRACING: CPT

## 2025-06-11 RX ORDER — ACETAMINOPHEN 325 MG/1
975 TABLET ORAL ONCE
Status: COMPLETED | OUTPATIENT
Start: 2025-06-11 | End: 2025-06-11

## 2025-06-11 RX ORDER — KETOROLAC TROMETHAMINE 15 MG/ML
15 INJECTION, SOLUTION INTRAMUSCULAR; INTRAVENOUS ONCE
Status: COMPLETED | OUTPATIENT
Start: 2025-06-11 | End: 2025-06-11

## 2025-06-11 RX ORDER — ONDANSETRON HYDROCHLORIDE 2 MG/ML
4 INJECTION, SOLUTION INTRAVENOUS ONCE
Status: COMPLETED | OUTPATIENT
Start: 2025-06-11 | End: 2025-06-11

## 2025-06-11 RX ADMIN — ONDANSETRON 4 MG: 2 INJECTION INTRAMUSCULAR; INTRAVENOUS at 22:21

## 2025-06-11 RX ADMIN — KETOROLAC TROMETHAMINE 15 MG: 15 INJECTION, SOLUTION INTRAMUSCULAR; INTRAVENOUS at 23:51

## 2025-06-11 RX ADMIN — SODIUM CHLORIDE, SODIUM LACTATE, POTASSIUM CHLORIDE, AND CALCIUM CHLORIDE 1000 ML: .6; .31; .03; .02 INJECTION, SOLUTION INTRAVENOUS at 22:20

## 2025-06-11 RX ADMIN — ACETAMINOPHEN 975 MG: 325 TABLET ORAL at 23:51

## 2025-06-11 ASSESSMENT — PAIN DESCRIPTION - LOCATION: LOCATION: BACK

## 2025-06-11 ASSESSMENT — PAIN DESCRIPTION - ORIENTATION: ORIENTATION: ANTERIOR

## 2025-06-11 ASSESSMENT — PAIN SCALES - GENERAL: PAINLEVEL_OUTOF10: 7

## 2025-06-11 ASSESSMENT — PAIN DESCRIPTION - DESCRIPTORS: DESCRIPTORS: ACHING

## 2025-06-11 ASSESSMENT — PAIN - FUNCTIONAL ASSESSMENT: PAIN_FUNCTIONAL_ASSESSMENT: 0-10

## 2025-06-11 ASSESSMENT — PAIN DESCRIPTION - PAIN TYPE: TYPE: ACUTE PAIN

## 2025-06-11 NOTE — PROGRESS NOTES
CHW Note  CHW spoke with pt to discuss housing resources via outbound phone call.  Introduced self and role as CHW.  Pt states she is currently homeless and staying with her mom, she is working and has an income, she is looking to move.  CHW educated pt on [employer] rental stability program which provides a security deposit and up to 3 months of rent, as well as her [employer] insurance benefits, such as her case management benefit and other supports.  Pt provided her email as [email or web address] and requested that I email resources here.  CHW emailed pt all information discussed, including direct link to the [employer] program eligibility , and the June calendar for the [facility] resource center.  Discussed the following topics on behalf of the patient:  []  Behavioral Health Assistance     []  Case Management  []   Assistance  []  Digital Equity Assistance  []  Dental Health Assistance  []  Education Assistance  []  Employment Assistance  [x]  Financial Strain Relief Assistance  []  Food Insecurity Assistance  []  Healthcare Coverage Assistance  [x]  Housing Stability Assistance  []  IP Violence Relief Assistance  []  Legal Assistance  []  Physical Activity Assistance  []  Social Connection Assistance  []  Stress Relief Assistance   []  Substance Abuse Assistance  []  Transportation Assistance  []  Utility Assistance  []  Other: [insert comment here]  Tash Major, [facility]

## 2025-06-11 NOTE — Clinical Note
Arlen Braga was seen and treated in our emergency department on 6/11/2025.  She may return to work on 06/14/2025.       If you have any questions or concerns, please don't hesitate to call.      Deejay Melgoza MD

## 2025-06-11 NOTE — ED TRIAGE NOTES
Pt presents to  the ED for complaints of dizziness, back pain and a headache that started this morning when she woke up. Pt states that she was given medications for her pancreatitis that she took last night. Pt states she thinks she had a reaction to either the oxycodone or the hydralazine. Pt was just seen for her pancreatitis and was released yesterday morning. Pt is tachycardic in triage and normotensive.

## 2025-06-12 ENCOUNTER — APPOINTMENT (OUTPATIENT)
Dept: RADIOLOGY | Facility: HOSPITAL | Age: 30
End: 2025-06-12
Payer: COMMERCIAL

## 2025-06-12 VITALS
DIASTOLIC BLOOD PRESSURE: 73 MMHG | HEIGHT: 64 IN | WEIGHT: 205 LBS | OXYGEN SATURATION: 98 % | TEMPERATURE: 97.5 F | RESPIRATION RATE: 16 BRPM | BODY MASS INDEX: 35 KG/M2 | HEART RATE: 78 BPM | SYSTOLIC BLOOD PRESSURE: 120 MMHG

## 2025-06-12 LAB
ATRIAL RATE: 129 BPM
CARDIAC TROPONIN I PNL SERPL HS: 8 NG/L (ref 0–34)
P AXIS: 90 DEGREES
P OFFSET: 194 MS
P ONSET: 126 MS
PR INTERVAL: 188 MS
PREGNANCY TEST URINE, POC: NEGATIVE
Q ONSET: 220 MS
QRS COUNT: 21 BEATS
QRS DURATION: 82 MS
QT INTERVAL: 274 MS
QTC CALCULATION(BAZETT): 401 MS
QTC FREDERICIA: 353 MS
R AXIS: 87 DEGREES
T AXIS: -79 DEGREES
T OFFSET: 357 MS
VENTRICULAR RATE: 129 BPM

## 2025-06-12 PROCEDURE — 2500000004 HC RX 250 GENERAL PHARMACY W/ HCPCS (ALT 636 FOR OP/ED): Mod: SE

## 2025-06-12 PROCEDURE — 71275 CT ANGIOGRAPHY CHEST: CPT

## 2025-06-12 PROCEDURE — 74177 CT ABD & PELVIS W/CONTRAST: CPT

## 2025-06-12 PROCEDURE — 96375 TX/PRO/DX INJ NEW DRUG ADDON: CPT

## 2025-06-12 PROCEDURE — 2500000001 HC RX 250 WO HCPCS SELF ADMINISTERED DRUGS (ALT 637 FOR MEDICARE OP): Mod: SE

## 2025-06-12 PROCEDURE — 2550000001 HC RX 255 CONTRASTS: Mod: SE | Performed by: STUDENT IN AN ORGANIZED HEALTH CARE EDUCATION/TRAINING PROGRAM

## 2025-06-12 RX ORDER — ACETAMINOPHEN 325 MG/1
650 TABLET ORAL EVERY 6 HOURS PRN
Qty: 30 TABLET | Refills: 0 | Status: SHIPPED | OUTPATIENT
Start: 2025-06-12

## 2025-06-12 RX ORDER — DIPHENHYDRAMINE HCL 25 MG
25 CAPSULE ORAL EVERY 6 HOURS PRN
Qty: 16 CAPSULE | Refills: 0 | Status: SHIPPED | OUTPATIENT
Start: 2025-06-12 | End: 2025-06-16

## 2025-06-12 RX ORDER — METOCLOPRAMIDE HYDROCHLORIDE 5 MG/ML
10 INJECTION INTRAMUSCULAR; INTRAVENOUS ONCE
Status: COMPLETED | OUTPATIENT
Start: 2025-06-12 | End: 2025-06-12

## 2025-06-12 RX ORDER — METOCLOPRAMIDE 10 MG/1
10 TABLET ORAL EVERY 6 HOURS PRN
Qty: 28 TABLET | Refills: 0 | Status: SHIPPED | OUTPATIENT
Start: 2025-06-12 | End: 2025-06-19

## 2025-06-12 RX ORDER — DIPHENHYDRAMINE HCL 25 MG
25 CAPSULE ORAL ONCE
Status: COMPLETED | OUTPATIENT
Start: 2025-06-12 | End: 2025-06-12

## 2025-06-12 RX ADMIN — DIPHENHYDRAMINE HYDROCHLORIDE 25 MG: 25 CAPSULE ORAL at 02:12

## 2025-06-12 RX ADMIN — METOCLOPRAMIDE 10 MG: 5 INJECTION, SOLUTION INTRAMUSCULAR; INTRAVENOUS at 02:12

## 2025-06-12 RX ADMIN — IOHEXOL 75 ML: 350 INJECTION, SOLUTION INTRAVENOUS at 02:33

## 2025-06-12 ASSESSMENT — PAIN - FUNCTIONAL ASSESSMENT: PAIN_FUNCTIONAL_ASSESSMENT: 0-10

## 2025-06-12 ASSESSMENT — PAIN SCALES - GENERAL: PAINLEVEL_OUTOF10: 8

## 2025-06-12 NOTE — PROGRESS NOTES
I received Arlen Braga in signout from outgoing provider.  Please see the previous note for all HPI, PE and MDM up to the time of signout at 2300.    In brief Arlen Braga is an 29 y.o. female presenting for lightheadedness  Chief Complaint   Patient presents with    Dizziness   .  At the time of signout we were awaiting:  Results of laboratory studies and imaging  I examined the patient at time of assumption of care:  Constitutional: Resting comfortably in no acute distress  HEENT: EOMs are intact, no scleral icterus, normal phonation  Respiratory: No respiratory distress  Perfusion: Skin appears well-perfused  Neuro: Cranial nerves II through XII grossly intact, moving all 4 extremities with no obvious deficit, seen to ambulate normally  Abdomen: Soft nontender nondistended not peritonitic        During my care patient reassessed as above.  Vital signs stabilized.  Patient was tolerant of p.o. intake here in the emergency department.  Likely presentation of enteritis based on CT scan.  She was discharged with return precautions and multimodal medication therapy.      Patient seen and discussed with attending of record.    Deejay Melgoza MD   
negative...

## 2025-06-12 NOTE — DISCHARGE INSTRUCTIONS
You have been evaluated in the Emergency Department today for dizziness and nausea and vomiting. Your evaluation, including lab work and CT scan, suggests that your symptoms are due to enteritis.    Please follow up with your primary care physician within two days. If you do not have a primary care doctor you may call [phone] to make an appointment.    Return to the Emergency Department if you experience abdominal pain, return of symptoms. Return to the Emergency Department if you develop any new or worsening symptoms.   Thank you for choosing us for your care.

## 2025-06-12 NOTE — ED PROVIDER NOTES
History of Present Illness       History provided by: Patient  Limitations to History: None  External Records Reviewed with Brief Summary: Notes reviewed in patient's chart    HPI:  HPI     This is a 29-year-old female with past medical history of HTN and pancreatitis pending to the ED for multiple complaints.  Patient states at 7 AM she woke up with a headache, generalized weakness, nausea/vomiting, dizziness/lightheadedness without syncope, heart racing, chest pressure.  States she has had multiple episodes of chest pressure which it feels like someone is sitting on the side of her chest that last about 15 to 20 minutes and resolves.  States she has had multiple episodes of emesis without blood.  States last alcohol use was prior to recent admission.  States he did take Tylenol without relief of symptoms.  States upon discharge from admission she was felt much improved.  Endorsing abdominal pain although consistent with previous pain per patient.  Denies hemoptysis, head injury, urinary symptoms, leg pain, vision changes.    Physical Exam   Physical Exam  Constitutional:       Appearance: Normal appearance.   HENT:      Head: Normocephalic.      Nose: Nose normal.      Mouth/Throat:      Mouth: Mucous membranes are dry.   Eyes:      Extraocular Movements: Extraocular movements intact.   Cardiovascular:      Rate and Rhythm: Normal rate and regular rhythm.   Pulmonary:      Effort: Pulmonary effort is normal. No respiratory distress.      Breath sounds: Normal breath sounds.   Abdominal:      Tenderness: There is abdominal tenderness in the epigastric area and left upper quadrant.   Musculoskeletal:         General: Normal range of motion.      Cervical back: Normal range of motion.   Skin:     General: Skin is warm.   Neurological:      General: No focal deficit present.      Mental Status: She is alert and oriented to person, place, and time.      Cranial Nerves: No dysarthria or facial asymmetry.       Sensory: Sensation is intact.      Motor: Motor function is intact.      Coordination: Finger-Nose-Finger Test normal.   Psychiatric:         Mood and Affect: Mood normal.         Behavior: Behavior normal.         Thought Content: Thought content normal.          Triage vitals:  T 36.4 °C (97.5 °F)  HR (!) 141  /78  RR 18  O2 98 % None (Room air)    Medical Decision Making & ED Course     ED Course & [facility]       Medical Decision Making:  Medical Decision Making  This is a 29-year-old female with past medical history of HTN and pancreatitis pending to the ED for multiple complaints; including weakness, HA, vomiting, dizziness, chest pressure.  On arrival to the ED patient is tachycardic, afebrile.    On exam patient is alert and oriented in mild distress due to pain.  Patient is neurally intact, EOM intact, sensation intact, strength intact, facial symmetry.  Abdomen slightly tender to palpation along the epigastric and left upper quadrant.  Patient does appear dry on exam.    Per chart review patient was seen here on 6/6/25 and admitted for pancreatitis.  Patient discharged on 6/10/2025 with improvement of symptoms.    Will obtain cardiac workup due to chest pressure.  CT PE due to tachycardia, chest pain, recent admission.  CT abdomen pelvis to reassess pancreatitis.  Will administer IV fluids due to dehydration, suspicion for hypovolemia causing headache; low suspicion for intracranial abnormality (neurologically intact), imaging not warranted at this time.  Antiemetics with analgesics provided.  Low suspicion for alcohol withdrawal as last drink was prior to admission.    At this time patient is in stable condition and I have signed out to another resident.  Plan is to follow labs with likely admission for symptom management and IV rehydration.        Differential diagnoses considered include but are not limited to: PE, ACS, pancreatitis, intra-abdominal abnormalities, TSH abnormality, intracranial  "abnormality     EKG Independent Interpretation: EKG per my read shows sinus tachycardia, 129 bpm, , QRS 82, QTc 401.  Normal axis.  T wave inversion in lead II, 3, aVF, V3.      Visit Vitals  /77 (BP Location: Right arm, Patient Position: Lying)   Pulse 86   Temp 36.4 °C (97.5 °F) (Tympanic)   Resp 16   Ht 1.626 m (5' 4\")   Wt 93 kg (205 lb)   SpO2 99%   BMI 35.19 kg/m²   OB Status Recent pregnancy   Smoking Status Every Day   BSA 2.05 m²        Labs Reviewed   CBC WITH AUTO DIFFERENTIAL       Result Value    WBC 5.8      nRBC 0.0      RBC 4.10      Hemoglobin 13.3      Hematocrit 39.2      MCV 96      MCH 32.4      MCHC 33.9      RDW 12.5      Platelets 188      Neutrophils % 59.8      Immature Granulocytes %, Automated 0.2      Lymphocytes % 32.0      Monocytes % 7.0      Eosinophils % 0.5      Basophils % 0.5      Neutrophils Absolute 3.49      Immature Granulocytes Absolute, Automated 0.01      Lymphocytes Absolute 1.87      Monocytes Absolute 0.41      Eosinophils Absolute 0.03      Basophils Absolute 0.03     COMPREHENSIVE METABOLIC PANEL   TROPONIN SERIES- (INITIAL, 1 HR)    Narrative:     The following orders were created for panel order Troponin I Series, High Sensitivity (0, 1 HR).  Procedure                               Abnormality         Status                     ---------                               -----------         ------                     Troponin I, High Sensiti...[[id number]]                      In process                 Troponin, High Sensitivi...[[id number]]                                                   Please view results for these tests on the individual orders.   MAGNESIUM   SERIAL TROPONIN-INITIAL   TSH WITH REFLEX TO FREE T4 IF ABNORMAL   SERIAL TROPONIN, 1 HOUR   POCT PREGNANCY, URINE       XR chest 2 views   Final Result   1.  No evidence of acute cardiopulmonary process.                  MACRO:   None        Signed by: Jimmy Martinez 6/11/2025 10:46 PM   Dictation " workstation:   [id number]      CT angio chest for pulmonary embolism    (Results Pending)   CT abdomen pelvis w IV contrast    (Results Pending)       ED Course:     Disposition     At this time patient is in stable condition I signed out to another resident.  Plan is to follow workup with likely admission to continue symptom management with IV rehydration.    Procedures   Procedures    This was a shared visit with an ED attending.  The patient was seen and discussed with the ED attending    Kena Ashton PA-C  Emergency Medicine      Kena Ashton PA-C  06/11/25 [id number]

## (undated) DEVICE — SUTURE, PDS II, 0 36 IN, CT-1, VIOLET

## (undated) DEVICE — SUTURE, VICRYL, 0, 36 IN, CT, UNDYED

## (undated) DEVICE — SUTURE, MONOCRYL PLUS, 4-0, PS-2 UD 27IN

## (undated) DEVICE — SUTURE, MONOCRYL, 2-0, 36 IN, CTX, VIOLET